# Patient Record
Sex: MALE | Race: WHITE | NOT HISPANIC OR LATINO | Employment: FULL TIME | URBAN - METROPOLITAN AREA
[De-identification: names, ages, dates, MRNs, and addresses within clinical notes are randomized per-mention and may not be internally consistent; named-entity substitution may affect disease eponyms.]

---

## 2018-04-18 LAB
LEFT EYE DM RETINOPATHY: NORMAL
RIGHT EYE DM RETINOPATHY: NORMAL

## 2018-05-19 ENCOUNTER — HOSPITAL ENCOUNTER (EMERGENCY)
Facility: HOSPITAL | Age: 48
Discharge: HOME | End: 2018-05-19
Attending: FAMILY MEDICINE
Payer: COMMERCIAL

## 2018-05-19 VITALS
TEMPERATURE: 99 F | SYSTOLIC BLOOD PRESSURE: 150 MMHG | DIASTOLIC BLOOD PRESSURE: 109 MMHG | HEART RATE: 97 BPM | RESPIRATION RATE: 16 BRPM | OXYGEN SATURATION: 97 %

## 2018-05-19 DIAGNOSIS — J02.9 PHARYNGITIS, UNSPECIFIED ETIOLOGY: Primary | ICD-10-CM

## 2018-05-19 PROCEDURE — 99283 EMERGENCY DEPT VISIT LOW MDM: CPT

## 2018-05-19 PROCEDURE — 63600175 PHARM REV CODE 636 W HCPCS: Mod: JG | Performed by: FAMILY MEDICINE

## 2018-05-19 RX ADMIN — PENICILLIN G BENZATHINE 1.2 MILLION UNITS: 1200000 INJECTION, SUSPENSION INTRAMUSCULAR at 01:05

## 2018-05-19 NOTE — ED PROVIDER NOTES
Encounter Date: 5/19/2018       History     Chief Complaint   Patient presents with    Oral Swelling     Pt complains of sorethroat for the past several hours, states has history of tonsillits and strep in the past and this feels the same. No fever or chills.           Review of patient's allergies indicates:  No Known Allergies  Past Medical History:   Diagnosis Date    Hypertension      Past Surgical History:   Procedure Laterality Date    KNEE SURGERY       No family history on file.  Social History   Substance Use Topics    Smoking status: Former Smoker     Packs/day: 1.00     Years: 15.00     Types: Cigarettes     Quit date: 5/19/2010    Smokeless tobacco: Never Used    Alcohol use No     Review of Systems   Constitutional: Negative.    HENT: Positive for sore throat.    Eyes: Negative.    Respiratory: Negative.    Cardiovascular: Negative.    Gastrointestinal: Negative.    Endocrine: Negative.    Genitourinary: Negative.    Musculoskeletal: Negative.    Neurological: Negative.    Hematological: Negative.    Psychiatric/Behavioral: Negative.        Physical Exam     Initial Vitals [05/19/18 0102]   BP Pulse Resp Temp SpO2   (!) 150/109 97 16 99.3 °F (37.4 °C) 97 %      MAP       122.67         Physical Exam    Nursing note and vitals reviewed.  Constitutional: He appears well-developed and well-nourished. He is not diaphoretic. No distress.   HENT:   Head: Normocephalic and atraumatic.   OP erythematous, mild tonsillar exudates, left greater than right.    Eyes: Pupils are equal, round, and reactive to light.   Neck: Normal range of motion. Neck supple.   Cardiovascular: Normal rate, regular rhythm, normal heart sounds and intact distal pulses. Exam reveals no gallop and no friction rub.    No murmur heard.  Pulmonary/Chest: Breath sounds normal. No respiratory distress. He has no wheezes. He has no rhonchi. He has no rales. He exhibits no tenderness.   Abdominal: Soft. Bowel sounds are normal. He  exhibits no distension. There is no tenderness. There is no rebound and no guarding.   Musculoskeletal: Normal range of motion. He exhibits no edema.   Neurological: He is alert and oriented to person, place, and time. He has normal strength.   Skin: Skin is warm and dry. Capillary refill takes less than 2 seconds.   Psychiatric: He has a normal mood and affect. His behavior is normal. Judgment and thought content normal.         ED Course   Procedures  Labs Reviewed - No data to display                               Clinical Impression:   The encounter diagnosis was Pharyngitis, unspecified etiology.                           Sudha Yo MD  05/19/18 0123

## 2018-05-19 NOTE — ED TRIAGE NOTES
Pt reports painful throat since 4pm.  Reports painful swallowing.  Pt stated he has had tonsil issues as a teenager.

## 2018-05-22 ENCOUNTER — HOSPITAL ENCOUNTER (EMERGENCY)
Facility: HOSPITAL | Age: 48
Discharge: HOME | End: 2018-05-23
Attending: EMERGENCY MEDICINE
Payer: COMMERCIAL

## 2018-05-22 VITALS
TEMPERATURE: 99 F | HEIGHT: 70 IN | HEART RATE: 92 BPM | SYSTOLIC BLOOD PRESSURE: 146 MMHG | RESPIRATION RATE: 16 BRPM | DIASTOLIC BLOOD PRESSURE: 96 MMHG | WEIGHT: 204 LBS | BODY MASS INDEX: 29.2 KG/M2 | OXYGEN SATURATION: 96 %

## 2018-05-22 DIAGNOSIS — J36 PERITONSILLAR ABSCESS: Primary | ICD-10-CM

## 2018-05-22 LAB
ANION GAP SERPL CALC-SCNC: 9 MMOL/L
BASOPHILS # BLD AUTO: 0.04 K/UL
BASOPHILS NFR BLD: 0.3 %
BUN SERPL-MCNC: 12 MG/DL
CALCIUM SERPL-MCNC: 9 MG/DL
CHLORIDE SERPL-SCNC: 98 MMOL/L
CO2 SERPL-SCNC: 26 MMOL/L
CREAT SERPL-MCNC: 0.9 MG/DL
DIFFERENTIAL METHOD: ABNORMAL
EOSINOPHIL # BLD AUTO: 0.1 K/UL
EOSINOPHIL NFR BLD: 0.8 %
ERYTHROCYTE [DISTWIDTH] IN BLOOD BY AUTOMATED COUNT: 11.3 %
EST. GFR  (AFRICAN AMERICAN): >60 ML/MIN/1.73 M^2
EST. GFR  (NON AFRICAN AMERICAN): >60 ML/MIN/1.73 M^2
GLUCOSE SERPL-MCNC: 291 MG/DL
HCT VFR BLD AUTO: 44.1 %
HGB BLD-MCNC: 16 G/DL
IMM GRANULOCYTES # BLD AUTO: 0.04 K/UL
IMM GRANULOCYTES NFR BLD AUTO: 0.3 %
LYMPHOCYTES # BLD AUTO: 1.3 K/UL
LYMPHOCYTES NFR BLD: 10.7 %
MCH RBC QN AUTO: 30.8 PG
MCHC RBC AUTO-ENTMCNC: 36.3 G/DL
MCV RBC AUTO: 85 FL
MONOCYTES # BLD AUTO: 1.4 K/UL
MONOCYTES NFR BLD: 11.2 %
NEUTROPHILS # BLD AUTO: 9.5 K/UL
NEUTROPHILS NFR BLD: 76.7 %
NRBC BLD-RTO: 0 /100 WBC
PLATELET # BLD AUTO: 341 K/UL
PMV BLD AUTO: 9.3 FL
POTASSIUM SERPL-SCNC: 3.5 MMOL/L
RBC # BLD AUTO: 5.19 M/UL
SODIUM SERPL-SCNC: 133 MMOL/L
WBC # BLD AUTO: 12.45 K/UL

## 2018-05-22 PROCEDURE — 99284 EMERGENCY DEPT VISIT MOD MDM: CPT | Mod: 25

## 2018-05-22 PROCEDURE — 85025 COMPLETE CBC W/AUTO DIFF WBC: CPT

## 2018-05-22 PROCEDURE — 96375 TX/PRO/DX INJ NEW DRUG ADDON: CPT

## 2018-05-22 PROCEDURE — 96361 HYDRATE IV INFUSION ADD-ON: CPT

## 2018-05-22 PROCEDURE — 63600175 PHARM REV CODE 636 W HCPCS: Performed by: EMERGENCY MEDICINE

## 2018-05-22 PROCEDURE — 25000003 PHARM REV CODE 250: Performed by: EMERGENCY MEDICINE

## 2018-05-22 PROCEDURE — 70491 CT SOFT TISSUE NECK W/DYE: CPT | Mod: TC

## 2018-05-22 PROCEDURE — 80048 BASIC METABOLIC PNL TOTAL CA: CPT

## 2018-05-22 PROCEDURE — 96374 THER/PROPH/DIAG INJ IV PUSH: CPT | Mod: 59

## 2018-05-22 PROCEDURE — 70491 CT SOFT TISSUE NECK W/DYE: CPT | Mod: 26,,, | Performed by: RADIOLOGY

## 2018-05-22 PROCEDURE — 25500020 PHARM REV CODE 255: Performed by: EMERGENCY MEDICINE

## 2018-05-22 RX ORDER — AMOXICILLIN AND CLAVULANATE POTASSIUM 875; 125 MG/1; MG/1
1 TABLET, FILM COATED ORAL
Status: COMPLETED | OUTPATIENT
Start: 2018-05-22 | End: 2018-05-22

## 2018-05-22 RX ORDER — ONDANSETRON 2 MG/ML
4 INJECTION INTRAMUSCULAR; INTRAVENOUS
Status: COMPLETED | OUTPATIENT
Start: 2018-05-22 | End: 2018-05-22

## 2018-05-22 RX ORDER — DEXAMETHASONE SODIUM PHOSPHATE 100 MG/10ML
10 INJECTION INTRAMUSCULAR; INTRAVENOUS
Status: COMPLETED | OUTPATIENT
Start: 2018-05-22 | End: 2018-05-22

## 2018-05-22 RX ORDER — BETAMETHASONE SODIUM PHOSPHATE AND BETAMETHASONE ACETATE 3; 3 MG/ML; MG/ML
12 INJECTION, SUSPENSION INTRA-ARTICULAR; INTRALESIONAL; INTRAMUSCULAR; SOFT TISSUE
Status: COMPLETED | OUTPATIENT
Start: 2018-05-22 | End: 2018-05-22

## 2018-05-22 RX ORDER — MORPHINE SULFATE 4 MG/ML
4 INJECTION, SOLUTION INTRAMUSCULAR; INTRAVENOUS
Status: COMPLETED | OUTPATIENT
Start: 2018-05-22 | End: 2018-05-22

## 2018-05-22 RX ADMIN — SODIUM CHLORIDE 1000 ML: 9 INJECTION, SOLUTION INTRAVENOUS at 08:05

## 2018-05-22 RX ADMIN — DEXAMETHASONE SODIUM PHOSPHATE 10 MG: 10 INJECTION INTRAMUSCULAR; INTRAVENOUS at 08:05

## 2018-05-22 RX ADMIN — AMOXICILLIN AND CLAVULANATE POTASSIUM 1 TABLET: 875; 125 TABLET, FILM COATED ORAL at 11:05

## 2018-05-22 RX ADMIN — Medication 12 MG: at 10:05

## 2018-05-22 RX ADMIN — IOHEXOL 75 ML: 350 INJECTION, SOLUTION INTRAVENOUS at 09:05

## 2018-05-22 RX ADMIN — ONDANSETRON 4 MG: 2 INJECTION INTRAMUSCULAR; INTRAVENOUS at 09:05

## 2018-05-22 RX ADMIN — MORPHINE SULFATE 4 MG: 4 INJECTION INTRAVENOUS at 09:05

## 2018-05-23 NOTE — ED NOTES
Updated pt and family on plan of care. Informed Dr Glass would see them soon regarding ENT recommendations.

## 2018-05-27 NOTE — ED PROVIDER NOTES
Encounter Date: 5/22/2018       History     Chief Complaint   Patient presents with    Sore Throat     seen here 5/19/18, given Bicillian, getting worse, having difficulty swallowing, also on Levaquin     Gradual marked worsening of sore throat day 3-4 following abx including Bicillin and Levaquin.    Dysphagia and dysphonia present     No stridor   No dyspnea           Review of patient's allergies indicates:  No Known Allergies  Past Medical History:   Diagnosis Date    Hypertension      Past Surgical History:   Procedure Laterality Date    KNEE SURGERY       History reviewed. No pertinent family history.  Social History   Substance Use Topics    Smoking status: Former Smoker     Packs/day: 1.00     Years: 15.00     Types: Cigarettes     Quit date: 5/19/2010    Smokeless tobacco: Never Used    Alcohol use No     Review of Systems   Constitutional: Positive for fever.   HENT: Positive for sore throat, trouble swallowing and voice change. Negative for ear pain and sinus pain.    Respiratory: Negative.    Cardiovascular: Negative.    Gastrointestinal: Negative for abdominal pain, nausea and vomiting.   Musculoskeletal: Negative.    Skin: Negative.    Hematological: Positive for adenopathy. Does not bruise/bleed easily.   All other systems reviewed and are negative.      Physical Exam     Initial Vitals [05/22/18 1950]   BP Pulse Resp Temp SpO2   (!) 148/104 (!) 115 20 98.9 °F (37.2 °C) 99 %      MAP       118.67         Physical Exam    Nursing note and vitals reviewed.  Constitutional: He appears well-developed and well-nourished. He is not diaphoretic. No distress.   HENT:   Head: Normocephalic and atraumatic.   Mouth/Throat: Mucous membranes are normal. Posterior oropharyngeal edema, posterior oropharyngeal erythema and tonsillar abscesses (Right sided ) present.   Eyes: Conjunctivae and EOM are normal. Pupils are equal, round, and reactive to light.   Neck: Neck supple. Carotid bruit is not present. No JVD  present.   Cardiovascular: Normal rate, regular rhythm, normal heart sounds and intact distal pulses.  No extrasystoles are present. Exam reveals no gallop and no friction rub.    No murmur heard.  Pulses:       Radial pulses are 2+ on the right side, and 2+ on the left side.   Pulmonary/Chest: Breath sounds normal. No respiratory distress. He has no decreased breath sounds. He has no wheezes. He has no rhonchi. He has no rales. He exhibits no tenderness.   Abdominal: Soft. Bowel sounds are normal. He exhibits no distension and no mass. There is no tenderness. There is no rebound and no guarding.   Musculoskeletal: Normal range of motion. He exhibits no edema or tenderness.   Neurological: He is alert and oriented to person, place, and time. He has normal strength. No sensory deficit.   Skin: Skin is warm and dry. Capillary refill takes less than 2 seconds. No rash noted. No erythema. No pallor.   Psychiatric: He has a normal mood and affect. His behavior is normal. Judgment and thought content normal.         ED Course   Procedures  Labs Reviewed   BASIC METABOLIC PANEL - Abnormal; Notable for the following:        Result Value    Sodium 133 (*)     Glucose 291 (*)     All other components within normal limits   CBC W/ AUTO DIFFERENTIAL - Abnormal; Notable for the following:     MCHC 36.3 (*)     RDW 11.3 (*)     Gran # (ANC) 9.5 (*)     Mono # 1.4 (*)     Gran% 76.7 (*)     Lymph% 10.7 (*)     All other components within normal limits             Medical Decision Making:   Initial Assessment:   R PTA  ED Management:  IVF  IV steroids  Augmentin    Imaging     Discussed with Dr MIMI COHEN who will see at 0830 for I and D   Pt understands plan of care and follow up at 0830                      Clinical Impression:   The encounter diagnosis was Peritonsillar abscess.    Disposition:   Disposition: Discharged  Condition: Stable                        Arnaldo Glass MD  05/26/18 7055

## 2019-04-11 ENCOUNTER — HOSPITAL ENCOUNTER (EMERGENCY)
Facility: HOSPITAL | Age: 49
Discharge: HOME | End: 2019-04-11
Payer: COMMERCIAL

## 2019-04-11 VITALS
WEIGHT: 208 LBS | DIASTOLIC BLOOD PRESSURE: 108 MMHG | HEIGHT: 70 IN | HEART RATE: 105 BPM | OXYGEN SATURATION: 96 % | RESPIRATION RATE: 18 BRPM | BODY MASS INDEX: 29.78 KG/M2 | SYSTOLIC BLOOD PRESSURE: 162 MMHG | TEMPERATURE: 99 F

## 2019-04-11 DIAGNOSIS — J02.0 STREP THROAT: Primary | ICD-10-CM

## 2019-04-11 LAB — DEPRECATED S PYO AG THROAT QL EIA: NEGATIVE

## 2019-04-11 PROCEDURE — 96372 THER/PROPH/DIAG INJ SC/IM: CPT

## 2019-04-11 PROCEDURE — 63600175 PHARM REV CODE 636 W HCPCS: Mod: JG | Performed by: NURSE PRACTITIONER

## 2019-04-11 PROCEDURE — 87880 STREP A ASSAY W/OPTIC: CPT

## 2019-04-11 PROCEDURE — 87081 CULTURE SCREEN ONLY: CPT

## 2019-04-11 PROCEDURE — 99284 EMERGENCY DEPT VISIT MOD MDM: CPT | Mod: 25

## 2019-04-11 PROCEDURE — 87147 CULTURE TYPE IMMUNOLOGIC: CPT

## 2019-04-11 RX ADMIN — PENICILLIN G BENZATHINE 1.2 MILLION UNITS: 1200000 INJECTION, SUSPENSION INTRAMUSCULAR at 04:04

## 2019-04-11 NOTE — DISCHARGE INSTRUCTIONS
Warm salt water gargles several times daily. Follow up with your primary care provider. Return to ER if symptoms worsen. Over the counter tylenol or motrin as directed for fever or pain.

## 2019-04-11 NOTE — ED PROVIDER NOTES
"Encounter Date: 2019       History     Chief Complaint   Patient presents with    Sore Throat     onset yesterday, reports pus pockets visualized      C/o sorethroat and "tonsils swelling" onset yesterday; hx same 1 year ago which lead to peritonsillar abscess requiring surgical drainage. Denies difficulty swallowing but report painful swallowing. Denies fever/chils. Denies cough.        Review of patient's allergies indicates:  No Known Allergies  Past Medical History:   Diagnosis Date    Hypertension      Past Surgical History:   Procedure Laterality Date    KNEE SURGERY       No family history on file.  Social History     Tobacco Use    Smoking status: Former Smoker     Packs/day: 1.00     Years: 15.00     Pack years: 15.00     Types: Cigarettes     Last attempt to quit: 2010     Years since quittin.9    Smokeless tobacco: Never Used   Substance Use Topics    Alcohol use: No    Drug use: No     Review of Systems   Constitutional: Negative for chills and fever.   HENT: Positive for sore throat. Negative for sinus pressure and sinus pain.    Respiratory: Negative for cough and shortness of breath.    Gastrointestinal: Negative for nausea and vomiting.   Genitourinary: Negative for difficulty urinating, dysuria and flank pain.   Skin: Negative for rash and wound.   Neurological: Negative for dizziness and headaches.   All other systems reviewed and are negative.      Physical Exam     Initial Vitals [19 1605]   BP Pulse Resp Temp SpO2   (!) 162/108 105 18 99 °F (37.2 °C) 96 %      MAP       --         Physical Exam    Nursing note and vitals reviewed.  Constitutional: He appears well-developed and well-nourished. He is not diaphoretic. No distress.   HENT:   Head: Normocephalic and atraumatic.   Mouth/Throat: Oropharynx is clear and moist.   Diffuse pharyngeal erythema with white patchy exudate. Mild enlargement of left tonsil; no airway encroachment, no airway edema.   Eyes: Pupils are " equal, round, and reactive to light.   Neck: Normal range of motion. Neck supple.   Cardiovascular: Normal rate, regular rhythm, normal heart sounds and intact distal pulses.   No murmur heard.  Pulmonary/Chest: Breath sounds normal. No respiratory distress.   Abdominal: Soft. Bowel sounds are normal. He exhibits no distension.   Musculoskeletal: Normal range of motion. He exhibits no edema or tenderness.   Neurological: He is alert and oriented to person, place, and time. GCS score is 15. GCS eye subscore is 4. GCS verbal subscore is 5. GCS motor subscore is 6.   Skin: Skin is warm and dry. Capillary refill takes less than 2 seconds.   Psychiatric: He has a normal mood and affect.         ED Course   Procedures  Labs Reviewed   THROAT SCREEN, RAPID          Imaging Results    None          Medical Decision Making:   Differential Diagnosis:   Strep throat, viral pharyngitis, peritonsillar abscess  Clinical Tests:   Lab Tests: Ordered                      Clinical Impression:       ICD-10-CM ICD-9-CM   1. Strep throat J02.0 034.0                                Michelle Sommers NP  04/11/19 3252

## 2019-04-13 LAB — BACTERIA THROAT CULT: NORMAL

## 2019-06-12 ENCOUNTER — PATIENT OUTREACH (OUTPATIENT)
Dept: ADMINISTRATIVE | Facility: HOSPITAL | Age: 49
End: 2019-06-12

## 2019-06-24 ENCOUNTER — OFFICE VISIT (OUTPATIENT)
Dept: FAMILY MEDICINE | Facility: CLINIC | Age: 49
End: 2019-06-24
Payer: COMMERCIAL

## 2019-06-24 ENCOUNTER — LAB VISIT (OUTPATIENT)
Dept: LAB | Facility: HOSPITAL | Age: 49
End: 2019-06-24
Attending: FAMILY MEDICINE
Payer: COMMERCIAL

## 2019-06-24 VITALS
SYSTOLIC BLOOD PRESSURE: 136 MMHG | HEART RATE: 77 BPM | HEIGHT: 71 IN | WEIGHT: 203 LBS | OXYGEN SATURATION: 98 % | RESPIRATION RATE: 18 BRPM | DIASTOLIC BLOOD PRESSURE: 87 MMHG | BODY MASS INDEX: 28.42 KG/M2

## 2019-06-24 DIAGNOSIS — E11.9 TYPE 2 DIABETES MELLITUS WITHOUT COMPLICATION, WITHOUT LONG-TERM CURRENT USE OF INSULIN: ICD-10-CM

## 2019-06-24 DIAGNOSIS — E11.42 DIABETIC POLYNEUROPATHY ASSOCIATED WITH TYPE 2 DIABETES MELLITUS: Primary | ICD-10-CM

## 2019-06-24 DIAGNOSIS — G47.61 PLMD (PERIODIC LIMB MOVEMENT DISORDER): ICD-10-CM

## 2019-06-24 LAB
ALBUMIN SERPL BCP-MCNC: 4.4 G/DL (ref 3.5–5.2)
ALP SERPL-CCNC: 61 U/L (ref 55–135)
ALT SERPL W/O P-5'-P-CCNC: 26 U/L (ref 10–44)
ANION GAP SERPL CALC-SCNC: 11 MMOL/L (ref 8–16)
AST SERPL-CCNC: 17 U/L (ref 10–40)
BASOPHILS # BLD AUTO: 0.04 K/UL (ref 0–0.2)
BASOPHILS NFR BLD: 0.6 % (ref 0–1.9)
BILIRUB SERPL-MCNC: 0.8 MG/DL (ref 0.1–1)
BUN SERPL-MCNC: 21 MG/DL (ref 6–20)
CALCIUM SERPL-MCNC: 9.6 MG/DL (ref 8.7–10.5)
CHLORIDE SERPL-SCNC: 101 MMOL/L (ref 95–110)
CHOLEST SERPL-MCNC: 138 MG/DL (ref 120–199)
CHOLEST/HDLC SERPL: 3.5 {RATIO} (ref 2–5)
CO2 SERPL-SCNC: 28 MMOL/L (ref 23–29)
CREAT SERPL-MCNC: 0.8 MG/DL (ref 0.5–1.4)
DIFFERENTIAL METHOD: ABNORMAL
EOSINOPHIL # BLD AUTO: 0.1 K/UL (ref 0–0.5)
EOSINOPHIL NFR BLD: 2.1 % (ref 0–8)
ERYTHROCYTE [DISTWIDTH] IN BLOOD BY AUTOMATED COUNT: 11.6 % (ref 11.5–14.5)
EST. GFR  (AFRICAN AMERICAN): >60 ML/MIN/1.73 M^2
EST. GFR  (NON AFRICAN AMERICAN): >60 ML/MIN/1.73 M^2
ESTIMATED AVG GLUCOSE: 237 MG/DL (ref 68–131)
GLUCOSE SERPL-MCNC: 134 MG/DL (ref 70–110)
HBA1C MFR BLD HPLC: 9.9 % (ref 4.5–6.2)
HCT VFR BLD AUTO: 43.6 % (ref 40–54)
HDLC SERPL-MCNC: 39 MG/DL (ref 40–75)
HDLC SERPL: 28.3 % (ref 20–50)
HGB BLD-MCNC: 14.5 G/DL (ref 14–18)
IMM GRANULOCYTES # BLD AUTO: 0.02 K/UL (ref 0–0.04)
IMM GRANULOCYTES NFR BLD AUTO: 0.3 % (ref 0–0.5)
LDLC SERPL CALC-MCNC: 80.6 MG/DL (ref 63–159)
LYMPHOCYTES # BLD AUTO: 1.9 K/UL (ref 1–4.8)
LYMPHOCYTES NFR BLD: 28.1 % (ref 18–48)
MCH RBC QN AUTO: 30 PG (ref 27–31)
MCHC RBC AUTO-ENTMCNC: 33.3 G/DL (ref 32–36)
MCV RBC AUTO: 90 FL (ref 82–98)
MONOCYTES # BLD AUTO: 0.7 K/UL (ref 0.3–1)
MONOCYTES NFR BLD: 10.9 % (ref 4–15)
NEUTROPHILS # BLD AUTO: 3.9 K/UL (ref 1.8–7.7)
NEUTROPHILS NFR BLD: 58 % (ref 38–73)
NONHDLC SERPL-MCNC: 99 MG/DL
NRBC BLD-RTO: 0 /100 WBC
PLATELET # BLD AUTO: 320 K/UL (ref 150–350)
PMV BLD AUTO: 8.9 FL (ref 9.2–12.9)
POTASSIUM SERPL-SCNC: 4.1 MMOL/L (ref 3.5–5.1)
PROT SERPL-MCNC: 7.9 G/DL (ref 6–8.4)
RBC # BLD AUTO: 4.84 M/UL (ref 4.6–6.2)
SODIUM SERPL-SCNC: 140 MMOL/L (ref 136–145)
T4 FREE SERPL-MCNC: 0.81 NG/DL (ref 0.71–1.51)
TRIGL SERPL-MCNC: 92 MG/DL (ref 30–150)
TSH SERPL DL<=0.005 MIU/L-ACNC: 1.59 UIU/ML (ref 0.34–5.6)
WBC # BLD AUTO: 6.68 K/UL (ref 3.9–12.7)

## 2019-06-24 PROCEDURE — 80061 LIPID PANEL: CPT

## 2019-06-24 PROCEDURE — 99999 PR PBB SHADOW E&M-EST. PATIENT-LVL III: CPT | Mod: PBBFAC,,, | Performed by: FAMILY MEDICINE

## 2019-06-24 PROCEDURE — 84439 ASSAY OF FREE THYROXINE: CPT

## 2019-06-24 PROCEDURE — 85025 COMPLETE CBC W/AUTO DIFF WBC: CPT

## 2019-06-24 PROCEDURE — 99204 PR OFFICE/OUTPT VISIT, NEW, LEVL IV, 45-59 MIN: ICD-10-PCS | Mod: S$GLB,,, | Performed by: FAMILY MEDICINE

## 2019-06-24 PROCEDURE — 83036 HEMOGLOBIN GLYCOSYLATED A1C: CPT

## 2019-06-24 PROCEDURE — 99999 PR PBB SHADOW E&M-EST. PATIENT-LVL III: ICD-10-PCS | Mod: PBBFAC,,, | Performed by: FAMILY MEDICINE

## 2019-06-24 PROCEDURE — 84443 ASSAY THYROID STIM HORMONE: CPT

## 2019-06-24 PROCEDURE — 84681 ASSAY OF C-PEPTIDE: CPT

## 2019-06-24 PROCEDURE — 36415 COLL VENOUS BLD VENIPUNCTURE: CPT

## 2019-06-24 PROCEDURE — 99204 OFFICE O/P NEW MOD 45 MIN: CPT | Mod: S$GLB,,, | Performed by: FAMILY MEDICINE

## 2019-06-24 PROCEDURE — 80053 COMPREHEN METABOLIC PANEL: CPT

## 2019-06-24 RX ORDER — GABAPENTIN 100 MG/1
100 CAPSULE ORAL 3 TIMES DAILY
Refills: 0 | COMMUNITY
Start: 2019-06-12 | End: 2019-06-24 | Stop reason: SDUPTHER

## 2019-06-24 RX ORDER — LANCETS 26 GAUGE
EACH MISCELLANEOUS
Refills: 5 | COMMUNITY
Start: 2019-04-29

## 2019-06-24 RX ORDER — BLOOD-GLUCOSE METER
EACH MISCELLANEOUS
Refills: 0 | COMMUNITY
Start: 2019-04-30

## 2019-06-24 RX ORDER — PEN NEEDLE, DIABETIC 30 GX3/16"
NEEDLE, DISPOSABLE MISCELLANEOUS
Refills: 3 | COMMUNITY
Start: 2019-06-19 | End: 2019-11-11 | Stop reason: SDUPTHER

## 2019-06-24 RX ORDER — ROPINIROLE 2 MG/1
2 TABLET, FILM COATED ORAL NIGHTLY
Qty: 30 TABLET | Refills: 2 | Status: SHIPPED | OUTPATIENT
Start: 2019-06-24 | End: 2019-10-14 | Stop reason: SDUPTHER

## 2019-06-24 RX ORDER — TELMISARTAN 40 MG/1
40 TABLET ORAL DAILY
Refills: 3 | COMMUNITY
Start: 2019-06-19 | End: 2019-09-03

## 2019-06-24 RX ORDER — CALCIUM CITRATE/VITAMIN D3 200MG-6.25
TABLET ORAL
Refills: 5 | COMMUNITY
Start: 2019-06-19 | End: 2020-09-10 | Stop reason: SDUPTHER

## 2019-06-24 RX ORDER — GABAPENTIN 300 MG/1
300 CAPSULE ORAL 3 TIMES DAILY
Qty: 90 CAPSULE | Refills: 2 | Status: SHIPPED | OUTPATIENT
Start: 2019-06-24 | End: 2019-07-15

## 2019-06-24 RX ORDER — ATORVASTATIN CALCIUM 40 MG/1
40 TABLET, FILM COATED ORAL DAILY
Refills: 0 | COMMUNITY
Start: 2019-04-29 | End: 2019-07-15 | Stop reason: SDUPTHER

## 2019-06-24 NOTE — PROGRESS NOTES
"Subjective:       Patient ID: Umberto Okeefe is a 49 y.o. male.    Chief Complaint: Establish Care and Leg Pain (& feet pain)    Establish care    In regard to the patient's diabetes, patient reports that blood sugars have been Good control. Patient denieshypoglycemia. Patient is not currently on insulin therapy. Patient is on ACE/ARB and is on statin. Last a1c was No results found for: HGBA1C    Neuropathy  Worsening  B  Feet            Review of Systems   Constitutional: Negative for activity change, appetite change, fatigue and fever.   HENT: Negative for congestion, dental problem, ear discharge, hearing loss, postnasal drip, sinus pain, sneezing and trouble swallowing.    Eyes: Negative for photophobia and discharge.   Respiratory: Negative for apnea, cough and shortness of breath.    Cardiovascular: Negative for chest pain.   Gastrointestinal: Negative for abdominal distention, abdominal pain, blood in stool, diarrhea, nausea and vomiting.   Endocrine: Negative for cold intolerance.   Genitourinary: Negative for difficulty urinating, flank pain, frequency, hematuria and testicular pain.   Musculoskeletal: Negative for arthralgias and myalgias.   Skin: Negative for color change.   Allergic/Immunologic: Negative for environmental allergies, food allergies and immunocompromised state.   Neurological: Negative for dizziness, syncope, numbness and headaches.   Hematological: Negative for adenopathy. Does not bruise/bleed easily.   Psychiatric/Behavioral: Negative for agitation, confusion, decreased concentration, hallucinations, self-injury and sleep disturbance.   All other systems reviewed and are negative.        Reviewed family, medical, surgical, and social history.    Objective:      /87 (BP Location: Right arm, Patient Position: Sitting, BP Method: Medium (Automatic))   Pulse 77   Resp 18   Ht 5' 10.5" (1.791 m)   Wt 92.1 kg (203 lb)   SpO2 98%   BMI 28.72 kg/m²   Physical Exam "   Constitutional: He is oriented to person, place, and time. He appears well-developed and well-nourished. No distress.   HENT:   Head: Normocephalic and atraumatic.   Nose: Nose normal.   Mouth/Throat: Oropharynx is clear and moist. No oropharyngeal exudate.   Eyes: Pupils are equal, round, and reactive to light. Conjunctivae and EOM are normal.   Neck: Normal range of motion. No thyromegaly present.   Cardiovascular: Normal rate, regular rhythm, normal heart sounds and intact distal pulses. Exam reveals no gallop and no friction rub.   No murmur heard.  Pulmonary/Chest: Effort normal and breath sounds normal. No respiratory distress. He has no wheezes. He has no rales.   Abdominal: Soft. He exhibits no distension. There is no tenderness. There is no guarding.   Musculoskeletal: Normal range of motion. He exhibits no edema, tenderness or deformity.   Neurological: He is alert and oriented to person, place, and time. He displays normal reflexes. No cranial nerve deficit or sensory deficit. He exhibits normal muscle tone. Coordination normal.   Skin: Skin is warm and dry. No rash noted. He is not diaphoretic. No erythema. No pallor.   Psychiatric: He has a normal mood and affect. His behavior is normal. Judgment and thought content normal.   Nursing note and vitals reviewed.      Assessment:       1. Diabetic polyneuropathy associated with type 2 diabetes mellitus    2. Type 2 diabetes mellitus without complication, without long-term current use of insulin    3. PLMD (periodic limb movement disorder)        Plan:       Diabetic polyneuropathy associated with type 2 diabetes mellitus  -     gabapentin (NEURONTIN) 300 MG capsule; Take 1 capsule (300 mg total) by mouth 3 (three) times daily.  Dispense: 90 capsule; Refill: 2    Type 2 diabetes mellitus without complication, without long-term current use of insulin  -     CBC auto differential; Future; Expected date: 06/24/2019  -     Comprehensive metabolic panel;  Future; Expected date: 06/24/2019  -     Microalbumin/creatinine urine ratio; Future  -     Lipid panel; Future; Expected date: 06/24/2019  -     TSH; Future; Expected date: 06/24/2019  -     T4, free; Future; Expected date: 06/24/2019  -     Hemoglobin A1c; Future; Expected date: 06/24/2019  -     C-peptide; Future; Expected date: 06/24/2019    PLMD (periodic limb movement disorder)  -     rOPINIRole (REQUIP) 2 MG tablet; Take 1 tablet (2 mg total) by mouth every evening.  Dispense: 30 tablet; Refill: 2    Check labs  Increase gabapentin  Start ropinirole        Risks, benefits, and side effects were discussed with the patient. All questions were answered to the fullest satisfaction of the patient, and pt verbalized understanding and agreement to treatment plan. Pt was to call with any new or worsening symptoms, or present to the ER.

## 2019-06-25 LAB — C PEPTIDE SERPL-MCNC: 2.89 NG/ML (ref 0.78–5.19)

## 2019-07-01 ENCOUNTER — PATIENT OUTREACH (OUTPATIENT)
Dept: ADMINISTRATIVE | Facility: HOSPITAL | Age: 49
End: 2019-07-01

## 2019-07-10 ENCOUNTER — TELEPHONE (OUTPATIENT)
Dept: FAMILY MEDICINE | Facility: CLINIC | Age: 49
End: 2019-07-10

## 2019-07-10 NOTE — TELEPHONE ENCOUNTER
I have returned Carolina's call.  Pt has sinus drainage at the back of his throat, clogged ears, hoarseness.  No c/o sore throat.  No fever.  He is coughing up clear phlegm.  Wife asked what pt may take OTC?  Please advise and thank you, Sofia

## 2019-07-10 NOTE — TELEPHONE ENCOUNTER
----- Message from Marie Phillips sent at 7/10/2019  8:13 AM CDT -----  Contact: Carolina/Wife  Type:  Sooner Apoointment Request    Caller is requesting a sooner appointment.  Caller declined first available appointment listed below.  Caller will not accept being placed on the waitlist and is requesting a message be sent to doctor.    Name of Caller:  Carolina  When is the first available appointment?  7/15  Symptoms:  Cough,drainage,ears stopped up  Best Call Back Number: [phone]   Additional Information:  Carolina would like to know if patient can take some over the counter medicine, Carolina is unsure due to patient's diabetes what he can take.  Please call to advise and schedule. Thanks!

## 2019-07-15 ENCOUNTER — OFFICE VISIT (OUTPATIENT)
Dept: FAMILY MEDICINE | Facility: CLINIC | Age: 49
End: 2019-07-15
Payer: COMMERCIAL

## 2019-07-15 VITALS
HEART RATE: 84 BPM | WEIGHT: 201.69 LBS | BODY MASS INDEX: 28.87 KG/M2 | SYSTOLIC BLOOD PRESSURE: 114 MMHG | DIASTOLIC BLOOD PRESSURE: 63 MMHG | RESPIRATION RATE: 18 BRPM | OXYGEN SATURATION: 96 % | HEIGHT: 70 IN

## 2019-07-15 DIAGNOSIS — E11.9 TYPE 2 DIABETES MELLITUS WITHOUT COMPLICATION, WITHOUT LONG-TERM CURRENT USE OF INSULIN: Primary | ICD-10-CM

## 2019-07-15 DIAGNOSIS — G62.9 NEUROPATHY: ICD-10-CM

## 2019-07-15 PROCEDURE — 99999 PR PBB SHADOW E&M-EST. PATIENT-LVL IV: CPT | Mod: PBBFAC,,, | Performed by: FAMILY MEDICINE

## 2019-07-15 PROCEDURE — 99214 PR OFFICE/OUTPT VISIT, EST, LEVL IV, 30-39 MIN: ICD-10-PCS | Mod: S$GLB,,, | Performed by: FAMILY MEDICINE

## 2019-07-15 PROCEDURE — 99999 PR PBB SHADOW E&M-EST. PATIENT-LVL IV: ICD-10-PCS | Mod: PBBFAC,,, | Performed by: FAMILY MEDICINE

## 2019-07-15 PROCEDURE — 99214 OFFICE O/P EST MOD 30 MIN: CPT | Mod: S$GLB,,, | Performed by: FAMILY MEDICINE

## 2019-07-15 RX ORDER — PREGABALIN 150 MG/1
150 CAPSULE ORAL 2 TIMES DAILY
Qty: 60 CAPSULE | Refills: 2 | Status: SHIPPED | OUTPATIENT
Start: 2019-07-15 | End: 2019-07-25

## 2019-07-15 RX ORDER — ATORVASTATIN CALCIUM 40 MG/1
40 TABLET, FILM COATED ORAL DAILY
Qty: 90 TABLET | Refills: 3 | Status: SHIPPED | OUTPATIENT
Start: 2019-07-15 | End: 2020-07-06 | Stop reason: SDUPTHER

## 2019-07-15 NOTE — PROGRESS NOTES
"Subjective:       Patient ID: Umberto Okeefe is a 49 y.o. male.    Chief Complaint: Leg Pain; Hip Pain; and Follow-up (BW review)    Follow up    DM not controlled  A1c 9.9  Taking meds as prescribed    Neuropathy  Not improved with gabapentin  Associated with back pain    Review of Systems   Constitutional: Negative for activity change, appetite change, fatigue and fever.   HENT: Negative for congestion, dental problem, ear discharge, hearing loss, postnasal drip, sinus pain, sneezing and trouble swallowing.    Eyes: Negative for photophobia and discharge.   Respiratory: Negative for apnea, cough and shortness of breath.    Cardiovascular: Negative for chest pain.   Gastrointestinal: Negative for abdominal distention, abdominal pain, blood in stool, diarrhea, nausea and vomiting.   Endocrine: Negative for cold intolerance.   Genitourinary: Negative for difficulty urinating, flank pain, frequency, hematuria and testicular pain.   Musculoskeletal: Positive for arthralgias, back pain, gait problem and myalgias.   Skin: Negative for color change.   Allergic/Immunologic: Negative for environmental allergies, food allergies and immunocompromised state.   Neurological: Negative for dizziness, syncope, numbness and headaches.   Hematological: Negative for adenopathy. Does not bruise/bleed easily.   Psychiatric/Behavioral: Negative for agitation, confusion, decreased concentration, hallucinations, self-injury and sleep disturbance.   All other systems reviewed and are negative.        Reviewed family, medical, surgical, and social history.    Objective:      /63 (BP Location: Left arm, Patient Position: Sitting, BP Method: Medium (Automatic))   Pulse 84   Resp 18   Ht 5' 10" (1.778 m)   Wt 91.5 kg (201 lb 11.2 oz)   SpO2 96%   BMI 28.94 kg/m²   Physical Exam   Constitutional: He is oriented to person, place, and time. He appears well-developed and well-nourished. No distress.   HENT:   Head: Normocephalic and " atraumatic.   Nose: Nose normal.   Mouth/Throat: Oropharynx is clear and moist. No oropharyngeal exudate.   Eyes: Pupils are equal, round, and reactive to light. Conjunctivae and EOM are normal.   Neck: Normal range of motion. No thyromegaly present.   Cardiovascular: Normal rate, regular rhythm, normal heart sounds and intact distal pulses. Exam reveals no gallop and no friction rub.   No murmur heard.  Pulmonary/Chest: Effort normal and breath sounds normal. No respiratory distress. He has no wheezes. He has no rales.   Abdominal: Soft. He exhibits no distension. There is no tenderness. There is no guarding.   Musculoskeletal: Normal range of motion. He exhibits tenderness and deformity. He exhibits no edema.   Neurological: He is alert and oriented to person, place, and time. He displays normal reflexes. No cranial nerve deficit or sensory deficit. He exhibits normal muscle tone. Coordination normal.   Skin: Skin is warm and dry. No rash noted. He is not diaphoretic. No erythema. No pallor.   Psychiatric: He has a normal mood and affect. His behavior is normal. Judgment and thought content normal.   Nursing note and vitals reviewed.      Assessment:       1. Type 2 diabetes mellitus without complication, without long-term current use of insulin    2. Neuropathy        Plan:       Type 2 diabetes mellitus without complication, without long-term current use of insulin  -     Ambulatory referral to Podiatry  -     Hemoglobin A1c; Future; Expected date: 07/15/2019  -     Comprehensive metabolic panel; Future; Expected date: 07/15/2019    Neuropathy  -     pregabalin (LYRICA) 150 MG capsule; Take 1 capsule (150 mg total) by mouth 2 (two) times daily.  Dispense: 60 capsule; Refill: 2  -     Ambulatory referral to Podiatry    Other orders  -     atorvastatin (LIPITOR) 40 MG tablet; Take 1 tablet (40 mg total) by mouth once daily.  Dispense: 90 tablet; Refill: 3  -     insulin detemir U-100 (LEVEMIR FLEXTOUCH) 100  unit/mL (3 mL) SubQ InPn pen; Inject 47 Units into the skin every evening.  Dispense: 42.3 mL; Refill: 0    Trial of lyrica  Increase insulin  Follow up in 3 months        Risks, benefits, and side effects were discussed with the patient. All questions were answered to the fullest satisfaction of the patient, and pt verbalized understanding and agreement to treatment plan. Pt was to call with any new or worsening symptoms, or present to the ER.

## 2019-07-16 ENCOUNTER — TELEPHONE (OUTPATIENT)
Dept: FAMILY MEDICINE | Facility: CLINIC | Age: 49
End: 2019-07-16

## 2019-07-16 NOTE — TELEPHONE ENCOUNTER
----- Message from Sagrario Machuca sent at 7/16/2019 10:20 AM CDT -----  Contact: Carolina Okeefe (Spouse)  Carolina Okeefe (Spouse)states the pt was seen on yesterday and the new Rx the Dr gave him the insurance will not pay foe it, the pharmacy was supposed to have sent this tot he office and she is calling to see if he office received it. The medication was for the pain in in pt legs and feet ...[phone]

## 2019-07-17 ENCOUNTER — TELEPHONE (OUTPATIENT)
Dept: FAMILY MEDICINE | Facility: CLINIC | Age: 49
End: 2019-07-17

## 2019-07-17 RX ORDER — INSULIN GLARGINE 100 [IU]/ML
47 INJECTION, SOLUTION SUBCUTANEOUS NIGHTLY
Qty: 14.1 ML | Refills: 11 | Status: SHIPPED | OUTPATIENT
Start: 2019-07-17 | End: 2019-07-22 | Stop reason: SDUPTHER

## 2019-07-17 NOTE — TELEPHONE ENCOUNTER
I have called pharmacy and the system still pulls up that the medication needs a PA. I called [facility] and the PA has been denied because pt has not been tried on  SSRI or tricyclic antidepressants, although pt did fail gabapentin 900 mg a day treatment.  Wife notified.  Please advise and thank you, Sofia

## 2019-07-17 NOTE — TELEPHONE ENCOUNTER
Pt is requesting to change Levemir to a more affordable insulin- Levemir copap is $75.  Please advise, thank you!        ----- Message from Alvaro Vann sent at 7/17/2019 10:26 AM CDT -----  Contact: Wife/Carolina Figueroa called in regarding the attached patient () and wanted to see if regarding patients Rx for his insulin detemir U-100 (LEVEMIR FLEXTOUCH) 100 unit/mL (3 mL) SubQ InPn pen.  Carolina wanted to see if it was changed to another brand if it would be cheaper than the $75 co-pay.    Carolina's call back number is [phone]

## 2019-07-17 NOTE — TELEPHONE ENCOUNTER
----- Message from Rachelle Leiva sent at 7/17/2019  1:20 PM CDT -----  Contact: spouse jenelle [phone]  Patient checking the status of foot medication, pending insurance and patient been waiting since Monday.  Patient is in pain.  Please call with the status   If the insurance don't pay, please call in an alternative or check to see the medication would be without the insurance.    Please call patient spouse jenelle [phone]

## 2019-07-22 RX ORDER — INSULIN GLARGINE 100 [IU]/ML
47 INJECTION, SOLUTION SUBCUTANEOUS NIGHTLY
Qty: 14.1 ML | Refills: 11 | Status: SHIPPED | OUTPATIENT
Start: 2019-07-22 | End: 2019-09-26

## 2019-07-25 ENCOUNTER — OFFICE VISIT (OUTPATIENT)
Dept: PODIATRY | Facility: CLINIC | Age: 49
End: 2019-07-25
Payer: COMMERCIAL

## 2019-07-25 ENCOUNTER — HOSPITAL ENCOUNTER (OUTPATIENT)
Dept: RADIOLOGY | Facility: HOSPITAL | Age: 49
Discharge: HOME | End: 2019-07-25
Attending: PODIATRIST
Payer: COMMERCIAL

## 2019-07-25 VITALS
HEART RATE: 72 BPM | TEMPERATURE: 99 F | BODY MASS INDEX: 28.77 KG/M2 | RESPIRATION RATE: 18 BRPM | DIASTOLIC BLOOD PRESSURE: 80 MMHG | OXYGEN SATURATION: 98 % | HEIGHT: 70 IN | SYSTOLIC BLOOD PRESSURE: 120 MMHG | WEIGHT: 201 LBS

## 2019-07-25 DIAGNOSIS — M79.672 PAIN IN BOTH FEET: ICD-10-CM

## 2019-07-25 DIAGNOSIS — M79.671 PAIN IN BOTH FEET: ICD-10-CM

## 2019-07-25 DIAGNOSIS — G57.90 NEURITIS OF FOOT, UNSPECIFIED LATERALITY: ICD-10-CM

## 2019-07-25 DIAGNOSIS — E11.49 TYPE II DIABETES MELLITUS WITH NEUROLOGICAL MANIFESTATIONS: ICD-10-CM

## 2019-07-25 DIAGNOSIS — G57.93 NEUROPATHIC PAIN OF BOTH FEET: ICD-10-CM

## 2019-07-25 PROCEDURE — 99999 PR PBB SHADOW E&M-EST. PATIENT-LVL III: ICD-10-PCS | Mod: PBBFAC,,, | Performed by: PODIATRIST

## 2019-07-25 PROCEDURE — 99999 PR PBB SHADOW E&M-EST. PATIENT-LVL III: CPT | Mod: PBBFAC,,, | Performed by: PODIATRIST

## 2019-07-25 PROCEDURE — 73620 X-RAY EXAM OF FOOT: CPT | Mod: 26,50,, | Performed by: RADIOLOGY

## 2019-07-25 PROCEDURE — 73620 XR FOOT 2 VIEW BILATERAL: ICD-10-PCS | Mod: 26,50,, | Performed by: RADIOLOGY

## 2019-07-25 PROCEDURE — 99203 PR OFFICE/OUTPT VISIT, NEW, LEVL III, 30-44 MIN: ICD-10-PCS | Mod: S$GLB,,, | Performed by: PODIATRIST

## 2019-07-25 PROCEDURE — 99203 OFFICE O/P NEW LOW 30 MIN: CPT | Mod: S$GLB,,, | Performed by: PODIATRIST

## 2019-07-25 PROCEDURE — 73620 X-RAY EXAM OF FOOT: CPT | Mod: 50,TC,FY

## 2019-07-25 RX ORDER — GABAPENTIN 100 MG/1
1 CAPSULE ORAL
COMMUNITY
Start: 2019-06-12 | End: 2019-07-25

## 2019-07-25 RX ORDER — DICLOFENAC SODIUM 10 MG/G
2 GEL TOPICAL 3 TIMES DAILY
Qty: 100 G | Refills: 2 | Status: SHIPPED | OUTPATIENT
Start: 2019-07-25 | End: 2019-10-18 | Stop reason: SDUPTHER

## 2019-07-25 RX ORDER — LEVOFLOXACIN 500 MG/1
TABLET, FILM COATED ORAL
COMMUNITY
Start: 2018-05-21 | End: 2019-08-08 | Stop reason: ALTCHOICE

## 2019-07-25 RX ORDER — NAPROXEN SODIUM 220 MG/1
TABLET, FILM COATED ORAL
COMMUNITY
Start: 2019-04-25 | End: 2019-07-25

## 2019-07-25 RX ORDER — MELOXICAM 15 MG/1
15 TABLET ORAL DAILY
Qty: 30 TABLET | Refills: 0 | Status: SHIPPED | OUTPATIENT
Start: 2019-07-25 | End: 2019-08-24

## 2019-07-25 NOTE — LETTER
July 29, 2019      Maicol Alba MD  [address]  [place] [address]           [facility]  [address] [facility] [place] [address]  Phone: [phone]  Fax: [phone]          Patient: Umberto Okeefe   MR Number: [id number]   YOB: 1970   Date of Visit: 7/25/2019       Dear Dr. Maicol Alba:    Thank you for referring Umberto Okeefe to me for evaluation. Attached you will find relevant portions of my assessment and plan of care.    If you have questions, please do not hesitate to call me. I look forward to following Umberto Okeefe along with you.    Sincerely,    Galilea Thomas, DPSOLANGE    Enclosure  CC:  No Recipients    If you would like to receive this communication electronically, please contact [email or web address] or [phone] to request more information on [employer] access.    For providers and/or their staff who would like to refer a patient to [facility], please contact us through our one-stop-shop provider referral line, [facility], at [phone].    If you feel you have received this communication in error or would no longer like to receive these types of communications, please e-mail [email or web address]

## 2019-07-27 ENCOUNTER — PATIENT MESSAGE (OUTPATIENT)
Dept: FAMILY MEDICINE | Facility: CLINIC | Age: 49
End: 2019-07-27

## 2019-07-29 RX ORDER — MONTELUKAST SODIUM 10 MG/1
10 TABLET ORAL NIGHTLY
Qty: 30 TABLET | Refills: 2 | Status: SHIPPED | OUTPATIENT
Start: 2019-07-29 | End: 2019-08-28

## 2019-07-30 NOTE — PROGRESS NOTES
Subjective:      Patient ID: Umberto Okeefe is a 49 y.o. male.    Chief Complaint: Diabetes Mellitus and Foot Pain (both feet, Numbness and Pain when walking )  Patient via referral Dr. Alba for diabetic foot exam  In complaint of numbness and pain worse in the toes, but all throughout his feet.  States it worse when walking and while rest at rest at night.  States his feet are very sensitive, always thinking, started with a needle sensation in the left big toe about 3 years ago.  States pain has escalated.   He reports a history of back and hip problems.  He was  Just recently diagnosed with diabetes in April, has had neuropathy for quite a few years prior to that diagnosis.  Does relate it is not well controlled at this time, he is seeing Dr. Alba, hoping to get his diabetes under control.  Glucose this morning was 194. He has been taking 300 mg of Neurontin 3 times a day, this was originally prescribed for nerve type symptoms in his hips.  States PCP  prescribed Lyrica but his insurance will not pay for        ROS  Constitutional    Pleasant, well-nourished, no distress, well oriented    Eyes         GLASSES    Cardiovascular          No chest pain, no shortness of breath,  no swelling in the extremities    Respiratory          No cough, no congestion     Musculoskeletal         No muscle aches, YES arthralgias/joint pain    Neurologic         NEUROPATHY    Endocrine          DIABETES        Objective:      Physical Exam   Cardiovascular:   Pulses:       Dorsalis pedis pulses are 2+ on the right side, and 2+ on the left side.        Posterior tibial pulses are 2+ on the right side, and 2+ on the left side.   Musculoskeletal:        Right foot: There is normal range of motion.        Left foot: There is normal range of motion.   Feet:   Right Foot:   Protective Sensation: 4 sites tested. 4 sites sensed.   Skin Integrity: Negative for erythema or callus.   Left Foot:   Protective Sensation: 4 sites tested. 4  sites sensed.   Skin Integrity: Negative for erythema or callus.   Vascular         Normal CFT bilateral   No lower extremity edema bilateral   Pedal skin temperature and color are normal bilateral     Integumentary    in good condition with no complications          Neurological   Gross sensation intact,  there is evidence of acute neuritis through all web spaces, feet are hypersensitive, positive paresthesias bilateral feet consistent with neuropathy    Musculoskeletal   Muscle Strength/Testing and Tone:  Intact, normal tone bilateral   Joints, Bones, and Muscles: Limited ROM 1st MPJ bilateral, AJ equinus bilateral        Walks well unassisted        Presents in appropriate shoes      X-Ray Foot 2 View Bilateral   Details     Reading Physician Reading Date Result Priority   Derek Joiner MD 7/25/2019 Routine      Narrative     EXAMINATION:  XR FOOT 2 VIEW BILATERAL    CLINICAL HISTORY:  . Pain in right foot    TECHNIQUE:  AP  and lateral views of the bilateral feet were performed.    COMPARISON:  None    FINDINGS:  Right:    No acute fracture or dislocation.  No significant soft tissue swelling.    Mild degenerative osteoarthrosis involving the 1st MTP joint and the IP joints of the digits.  The remaining joint spaces are preserved.  The tarsal bones are normal in appearance.  Normal tarsometatarsal alignment.    Small dorsal calcaneal spur.    Left:    No acute fracture or dislocation. No significant soft tissue swelling.    Mild degenerative osteoarthrosis involving the IP joints of the digits.  The remaining joint spaces are preserved. The tarsal bones are normal in appearance. Normal tarsometatarsal alignment.    Small dorsal and plantar calcaneal spurs.      Impression       1. No acute radiographic findings of the bilateral feet.  2. Bilateral calcaneal spurs.               Hemoglobin A1c    Ref Range & Units 1mo ago   Hemoglobin A1C 4.5 - 6.2 % 9.9High        Estimated Avg Glucose 68 - 131 mg/dL  237High                 Assessment:       Encounter Diagnoses   Name Primary?    Type II diabetes mellitus with neurological manifestations     Neuropathic pain of both feet     Neuritis of foot, unspecified laterality     Pain in both feet          Plan:       Umberto was seen today for diabetes mellitus and foot pain.    Diagnoses and all orders for this visit:    Type II diabetes mellitus with neurological manifestations    Neuropathic pain of both feet    Neuritis of foot, unspecified laterality    Pain in both feet  -     X-Ray Foot 2 View Bilateral; Future    Other orders  -     diclofenac sodium (VOLTAREN) 1 % Gel; Apply 2 g topically 3 (three) times daily.  -     meloxicam (MOBIC) 15 MG tablet; Take 1 tablet (15 mg total) by mouth once daily.    Reviewed x-rays with patient  Reviewed diabetic education with patient  And the benefits of tighter control of glucose/ diabetes regarding neuropathy.  Explained the patient the best treatment to decrease neuropathy pain and prevent it from getting worse in the future is better control of his diabetes.  Explained there are no medications to treat neuropathy, treatment is control of his diabetes.  Advised medications prescribed for neuropathy are only for the symptoms.  We had a lengthy discussion regarding neuritis and neuropathy.  Explained to patient he demonstrates evidence of both conditions.  Explained neuritis of this level is usually usually treated with IM cortisone injection, however it increases glucose and with his current glucose of 194 and diabetes not well controlled he is not a candidate for this injection.  Discussed other treatments for inflammation including ice /cool therapy as tolerated, as long as beneficial.  If [place] painful discontinue  Prescribed Voltaren gel and explained how to apply daily to feet  Started meloxicam 15 mg daily.  Discussed this medication at length with patient.  Instructed not to take any over-the-counter  anti-inflammatory with this medication.  Advised these treat 3 treatments can be very beneficial for neuritis  [place] may decrease his pain significantly.  Advised in the future if glucose is well controlled if needed we can consider IM cortisone injection.  Advised patient depending on outcome of treatments for inflammation we may increase his gabapentin.  Reviewed appropriate shoes, especially for indoors and explained how important appropriate sure is our with neuritis and neuropathy.  Patient was in understanding and agreement with treatment plan.  I counseled the patient on his conditions, their implications and medical management.  Instructed patient to contact the office with any changes, questions, concerns, worsening of symptoms. Patient verbalized understanding.   Total face-to-face time, exam, assessment, treatment, discussion, documentation 30 minutes, more than half this time spent on consultation and coordination of care.  Follow up 2 weeks      This note was created using [employer] voice recognition software that occasionally misinterpreted phrases or words.

## 2019-08-08 ENCOUNTER — OFFICE VISIT (OUTPATIENT)
Dept: PODIATRY | Facility: CLINIC | Age: 49
End: 2019-08-08
Payer: COMMERCIAL

## 2019-08-08 ENCOUNTER — TELEPHONE (OUTPATIENT)
Dept: PODIATRY | Facility: CLINIC | Age: 49
End: 2019-08-08

## 2019-08-08 VITALS
RESPIRATION RATE: 16 BRPM | HEIGHT: 70 IN | BODY MASS INDEX: 28.77 KG/M2 | TEMPERATURE: 98 F | WEIGHT: 201 LBS | SYSTOLIC BLOOD PRESSURE: 130 MMHG | HEART RATE: 71 BPM | DIASTOLIC BLOOD PRESSURE: 73 MMHG

## 2019-08-08 DIAGNOSIS — M54.9 BACK PAIN, UNSPECIFIED BACK LOCATION, UNSPECIFIED BACK PAIN LATERALITY, UNSPECIFIED CHRONICITY: Primary | ICD-10-CM

## 2019-08-08 DIAGNOSIS — E11.49 TYPE II DIABETES MELLITUS WITH NEUROLOGICAL MANIFESTATIONS: Primary | ICD-10-CM

## 2019-08-08 DIAGNOSIS — G57.90 NEURITIS OF FOOT, UNSPECIFIED LATERALITY: ICD-10-CM

## 2019-08-08 DIAGNOSIS — Z91.81 HISTORY OF FALL: ICD-10-CM

## 2019-08-08 DIAGNOSIS — M79.672 PAIN IN BOTH FEET: ICD-10-CM

## 2019-08-08 DIAGNOSIS — M79.671 PAIN IN BOTH FEET: ICD-10-CM

## 2019-08-08 PROCEDURE — 96372 THER/PROPH/DIAG INJ SC/IM: CPT | Mod: S$GLB,,, | Performed by: PODIATRIST

## 2019-08-08 PROCEDURE — 96372 PR INJECTION,THERAP/PROPH/DIAG2ST, IM OR SUBCUT: ICD-10-PCS | Mod: S$GLB,,, | Performed by: PODIATRIST

## 2019-08-08 PROCEDURE — 99999 PR PBB SHADOW E&M-EST. PATIENT-LVL III: CPT | Mod: PBBFAC,,, | Performed by: PODIATRIST

## 2019-08-08 PROCEDURE — 99215 OFFICE O/P EST HI 40 MIN: CPT | Mod: 25,S$GLB,, | Performed by: PODIATRIST

## 2019-08-08 PROCEDURE — 99215 PR OFFICE/OUTPT VISIT, EST, LEVL V, 40-54 MIN: ICD-10-PCS | Mod: 25,S$GLB,, | Performed by: PODIATRIST

## 2019-08-08 PROCEDURE — 99999 PR PBB SHADOW E&M-EST. PATIENT-LVL III: ICD-10-PCS | Mod: PBBFAC,,, | Performed by: PODIATRIST

## 2019-08-08 RX ORDER — BETAMETHASONE SODIUM PHOSPHATE AND BETAMETHASONE ACETATE 3; 3 MG/ML; MG/ML
18 INJECTION, SUSPENSION INTRA-ARTICULAR; INTRALESIONAL; INTRAMUSCULAR; SOFT TISSUE
Status: COMPLETED | OUTPATIENT
Start: 2019-08-08 | End: 2019-08-08

## 2019-08-08 RX ORDER — GABAPENTIN 300 MG/1
300 CAPSULE ORAL 3 TIMES DAILY
COMMUNITY
End: 2019-10-28

## 2019-08-08 RX ADMIN — BETAMETHASONE SODIUM PHOSPHATE AND BETAMETHASONE ACETATE 18 MG: 3; 3 INJECTION, SUSPENSION INTRA-ARTICULAR; INTRALESIONAL; INTRAMUSCULAR; SOFT TISSUE at 04:08

## 2019-08-08 NOTE — LETTER
August 12, 2019      Maicol Alba MD  [address]  [place] [address]           [facility]  [address] [place] [address]  Phone: [phone]  Fax: [phone]          Patient: Umberto Okeefe   MR Number: [id number]   YOB: 1970   Date of Visit: 8/8/2019       Dear Dr. Maicol Alba:    Thank you for referring Umberto Okeefe to me for evaluation. Attached you will find relevant portions of my assessment and plan of care.    If you have questions, please do not hesitate to call me. I look forward to following Umberto Okeefe along with you.    Sincerely,    Galilea Thomas, DPSOLANGE    Enclosure  CC:  No Recipients    If you would like to receive this communication electronically, please contact [email or web address] or [phone] to request more information on numberFire Link access.    For providers and/or their staff who would like to refer a patient to [facility], please contact us through our one-stop-shop provider referral line, [facility], at [phone].    If you feel you have received this communication in error or would no longer like to receive these types of communications, please e-mail [email or web address]

## 2019-08-08 NOTE — TELEPHONE ENCOUNTER
Memo Milian. I have been treating Mr Okeefe for some nerve related symptoms in his feet associated with an injury to his hips and lower back 2-3 years ago. He fell on his back side and has had hip pain L>R, low back pain and progressing symptoms in his feet with what sound likely sciatica or radiculopathy on the left leg side. He has an upcoming appt with you. Would you order xrays for his lower back and hips so you could discuss the results at that appt? Otherwise he'll wait until your f/u. thanks

## 2019-08-12 NOTE — PROGRESS NOTES
Subjective:      Patient ID: Umberto Okeefe is a 49 y.o. male.    Chief Complaint: Follow-up  Patient  presents with his wife for follow-up pain in both feet, neuritis, diabetes type 2 with neuropathy.  Patient reports Mobic and Voltaren Gel have both helped, having less pain overall.  Tolerating Mobic well, believes it is helped multiple areas. He reports however pain escalate throughout the day, worse left side, still having radiating pain down his left side, wife rubs over-the-counter medication outside hip and leg every night.  Does relate diabetes is improving,  reports glucose has been in normal range last few days. Patient and wife state symptoms in his feet and radiating pain on the left side seemed to have started after an injury 2-3 years ago where he fell off a hover board on to his tailbone.  Taking 300 mg Neurontin t.i.d. For diabetic neuropathy.      ROS  Constitutional    Pleasant, well-nourished, no distress, well oriented    Eyes         GLASSES    Cardiovascular          No chest pain, no shortness of breath,  no swelling in the extremities    Respiratory          No cough, no congestion     Musculoskeletal         No muscle aches, YES arthralgias/joint pain    Neurologic         NEUROPATHY    Endocrine          DIABETES        Objective:      Physical Exam   Cardiovascular:   Pulses:       Dorsalis pedis pulses are 2+ on the right side, and 2+ on the left side.        Posterior tibial pulses are 2+ on the right side, and 2+ on the left side.   Musculoskeletal:        Right foot: There is normal range of motion.        Left foot: There is normal range of motion.   Feet:   Right Foot:   Protective Sensation: 2 sites tested. 2 sites sensed.   Left Foot:   Protective Sensation: 2 sites tested. 2 sites sensed.   Vascular         Normal CFT bilateral   No lower extremity edema bilateral   Pedal skin temperature and color are normal bilateral     Integumentary    Unremarkable        Neurological    Gross sensation intact,  acute neuritis through web spaces is improved, remaining paresthesias bilateral feet consistent with neuropathy    Musculoskeletal   Muscle Strength/Testing and Tone:  Intact, normal tone bilateral   Joints, Bones, and Muscles: Limited ROM 1st MPJ bilateral, AJ equinus bilateral  Previous x-rays positive for mild degenerative arthritis IP joints and small calcaneal spurs        Walks well unassisted        Presents in appropriate shoes          Assessment:       Encounter Diagnoses   Name Primary?    Type II diabetes mellitus with neurological manifestations Yes    Neuritis of foot, unspecified laterality     Pain in both feet     History of fall          Plan:       Umberto was seen today for follow-up.    Diagnoses and all orders for this visit:    Type II diabetes mellitus with neurological manifestations    Neuritis of foot, unspecified laterality    Pain in both feet    History of fall    Other orders  -     betamethasone acetate-betamethasone sodium phosphate injection 18 mg      Advised patient decrease pain over 2 weeks with mild oral anti-inflammatory and topical is a good sign a lot of his pain is inflammation related.  We had discussed contacting Dr. Alba before patient's next appointment for possible x-rays/MRI.  I cannot find a message sent to him following patient's last visit and reassured patient I will again message Dr Alba regarding x-rays for back and hip since I do agree with him this may be lower back / hip related due to radiating pain on the left side.  Explained to patient and wife neuropathy diabetic neuropathy does not cause radiating pain on only 1 side.   Reviewed with patient his underlying discomfort and tingling symmetrical in both feet is diabetic related.  We reviewed acute neuritis and advised patient this is the condition in which he has improved significantly and hopefully will continue to do so over the next few months.   We again reviewed IM  cortisone injection, effectiveness of medication in decreasing inflammation.  Recommended injection today since patient's glucose has been well controlled the last few days.  Advised patient this injection cannot be given out very often and is strictly to reduce inflammation, most likely will not help his diabetic neuropathy.  Explained the patient injection will increase his glucose, he needs to check it several times for the next few days and follow a very strict diabetic diet.  Patient related he was in understanding and did wish to pursue IM injection.  Patient was administered 3 cc betamethasone IM left hip  Instructed patient continue Mobic and Voltaren gel daily and we reviewed appropriate shoes, especially arch support to help take pressure off the ball of the foot.  We reviewed appropriate  arch supports and how these are to be utilized and adjusted to in his tennis shoes.  We reviewed shoes for indoors, absolutely no flat shoes or walking barefoot  We agree to refrain from increasing Neurontin at this time.  Reviewed with patient overall treatment to help decrease neuropathic pain and prevent it from progressing is tighter control of his diabetes.  Patient was in understanding and agreement with treatment plan.  I counseled the patient on his conditions, their implications and medical management.  Instructed patient to contact the office with any changes, questions, concerns, worsening of symptoms. Patient verbalized understanding.   Total face-to-face time, exam, assessment, treatment, discussion, documentation 40 minutes, more than half this time spent on consultation and coordination of care.  Follow up 4 weeks      This note was created using [email or web address] voice recognition software that occasionally misinterpreted phrases or words.

## 2019-08-15 ENCOUNTER — HOSPITAL ENCOUNTER (OUTPATIENT)
Dept: RADIOLOGY | Facility: HOSPITAL | Age: 49
Discharge: HOME | End: 2019-08-15
Attending: FAMILY MEDICINE
Payer: COMMERCIAL

## 2019-08-15 DIAGNOSIS — M54.9 BACK PAIN, UNSPECIFIED BACK LOCATION, UNSPECIFIED BACK PAIN LATERALITY, UNSPECIFIED CHRONICITY: ICD-10-CM

## 2019-08-15 PROCEDURE — 73521 X-RAY EXAM HIPS BI 2 VIEWS: CPT | Mod: 26,,, | Performed by: RADIOLOGY

## 2019-08-15 PROCEDURE — 73521 XR HIPS BILATERAL 2 VIEW INCL AP PELVIS: ICD-10-PCS | Mod: 26,,, | Performed by: RADIOLOGY

## 2019-08-15 PROCEDURE — 72100 XR LUMBAR SPINE AP AND LATERAL: ICD-10-PCS | Mod: 26,,, | Performed by: RADIOLOGY

## 2019-08-15 PROCEDURE — 72100 X-RAY EXAM L-S SPINE 2/3 VWS: CPT | Mod: TC,FY

## 2019-08-15 PROCEDURE — 73521 X-RAY EXAM HIPS BI 2 VIEWS: CPT | Mod: TC,FY

## 2019-08-15 PROCEDURE — 72100 X-RAY EXAM L-S SPINE 2/3 VWS: CPT | Mod: 26,,, | Performed by: RADIOLOGY

## 2019-09-02 ENCOUNTER — PATIENT MESSAGE (OUTPATIENT)
Dept: FAMILY MEDICINE | Facility: CLINIC | Age: 49
End: 2019-09-02

## 2019-09-03 ENCOUNTER — PATIENT MESSAGE (OUTPATIENT)
Dept: FAMILY MEDICINE | Facility: CLINIC | Age: 49
End: 2019-09-03

## 2019-09-03 RX ORDER — TELMISARTAN 20 MG/1
20 TABLET ORAL DAILY
Qty: 90 TABLET | Refills: 3 | Status: SHIPPED | OUTPATIENT
Start: 2019-09-03 | End: 2019-09-26

## 2019-09-03 NOTE — TELEPHONE ENCOUNTER
Pt requesting Mycardis 40mg tablets (pt takes 1/2 tab QD) changes to 20mg tablets so he doesn't have to break the tablets.  Order pended as requested.  Thank you!

## 2019-09-04 ENCOUNTER — TELEPHONE (OUTPATIENT)
Dept: PODIATRY | Facility: CLINIC | Age: 49
End: 2019-09-04

## 2019-09-04 RX ORDER — LISINOPRIL 20 MG/1
20 TABLET ORAL DAILY
Qty: 90 TABLET | Refills: 3 | Status: SHIPPED | OUTPATIENT
Start: 2019-09-04 | End: 2020-05-20 | Stop reason: SDUPTHER

## 2019-09-04 NOTE — TELEPHONE ENCOUNTER
Spoke with Carolina Okeefe patients wife, she stated patient has been following directions regarding shoes and medications as prescribed. Foot pain continues please advise.

## 2019-09-04 NOTE — TELEPHONE ENCOUNTER
Can increase Gabapentin, which I will call in if he wants or change to Lyrica which I would need to see him to start

## 2019-09-04 NOTE — TELEPHONE ENCOUNTER
----- Message from Sweetie Hinojosa sent at 9/4/2019  8:19 AM CDT -----  Contact: Carolina Okeefe (Spouse)  Type: Needs Medical Advice    Who Called:  Carolina Okeefe (Spouse)  Symptoms (please be specific):  After taking the diclofenac sodium (VOLTAREN) 1 % Gel, the patient still has pain in feet  Pharmacy name and phone #:   [id number] - [place], [place] - [address]  [address] [place] [address]  Phone: [phone] Fax: [phone]  Best Call Back Number: Carolina at   Additional Information: Calling to find out if there is a diff ointment or gel the patient can take for the pain

## 2019-09-04 NOTE — TELEPHONE ENCOUNTER
Pt stated that his pharmacy is out of stock of his BP medication. Pt is asking to be prescribed 20 mg Lisinopril tablet. Medication has been pend.  Please advise.

## 2019-09-26 ENCOUNTER — OFFICE VISIT (OUTPATIENT)
Dept: PODIATRY | Facility: CLINIC | Age: 49
End: 2019-09-26
Payer: COMMERCIAL

## 2019-09-26 VITALS
RESPIRATION RATE: 19 BRPM | WEIGHT: 201 LBS | SYSTOLIC BLOOD PRESSURE: 140 MMHG | HEART RATE: 72 BPM | DIASTOLIC BLOOD PRESSURE: 92 MMHG | HEIGHT: 70 IN | TEMPERATURE: 98 F | BODY MASS INDEX: 28.77 KG/M2 | OXYGEN SATURATION: 98 %

## 2019-09-26 DIAGNOSIS — G25.81 RESTLESS LEG SYNDROME: ICD-10-CM

## 2019-09-26 DIAGNOSIS — E11.49 TYPE II DIABETES MELLITUS WITH NEUROLOGICAL MANIFESTATIONS: ICD-10-CM

## 2019-09-26 DIAGNOSIS — G57.93 NEUROPATHIC PAIN OF BOTH FEET: Primary | ICD-10-CM

## 2019-09-26 PROCEDURE — 99999 PR PBB SHADOW E&M-EST. PATIENT-LVL III: CPT | Mod: PBBFAC,,, | Performed by: PODIATRIST

## 2019-09-26 PROCEDURE — 99214 PR OFFICE/OUTPT VISIT, EST, LEVL IV, 30-39 MIN: ICD-10-PCS | Mod: S$GLB,,, | Performed by: PODIATRIST

## 2019-09-26 PROCEDURE — 99214 OFFICE O/P EST MOD 30 MIN: CPT | Mod: S$GLB,,, | Performed by: PODIATRIST

## 2019-09-26 PROCEDURE — 99999 PR PBB SHADOW E&M-EST. PATIENT-LVL III: ICD-10-PCS | Mod: PBBFAC,,, | Performed by: PODIATRIST

## 2019-09-26 RX ORDER — GABAPENTIN 600 MG/1
600 TABLET ORAL 3 TIMES DAILY
Qty: 90 TABLET | Refills: 0 | Status: SHIPPED | OUTPATIENT
Start: 2019-09-26 | End: 2019-10-26

## 2019-09-26 NOTE — PROGRESS NOTES
Subjective:       Patient ID: Umberto Okeefe is a 49 y.o. male.    Chief Complaint: Diabetic Foot Exam  Patient  presents with his wife for follow-up neuropathic pain in both feet.  He does have a history of type 2 diabetes, diagnosed in April of this year, but symptoms in feet and legs have been present for quite a long time.  Patient states he no longer has the improvement he initially  experienced with Voltaren gel, it has become uncomfortable to rub it into his feet.  Following previous conversations on slowly increasing gabapentin patient relates he started to increase it slowly.  Last week for about 4 days he took 2- 300 mg gabapentin in the morning, 1 in the afternoon, 2 at night with his Requip which helped.  Last few days he has taken 2 in the morning, 2 in the afternoon and 2 at night with his Requip which seems to work even better. Last 2 days he has had less sensitivity in his feet, no side effects during the day, does make him sleepy at night.  Still wakes up early morning with uncomfortable feeling throughout his legs and is going to talk to Dr. Alba about increasing the Requip on their next appointment.  He is due to have blood work 10/11 and follow-up with his PCP on 10/14        Past Medical History:   Diagnosis Date    Diabetes mellitus, type 2     Hypertension     Neuropathy     Restless leg syndrome      Past Surgical History:   Procedure Laterality Date    KNEE SURGERY           Current Outpatient Medications   Medication Sig Dispense Refill    atorvastatin (LIPITOR) 40 MG tablet Take 1 tablet (40 mg total) by mouth once daily. 90 tablet 3    diclofenac sodium (VOLTAREN) 1 % Gel Apply 2 g topically 3 (three) times daily. 100 g 2    gabapentin (NEURONTIN) 300 MG capsule Take 300 mg by mouth 3 (three) times daily.      gabapentin (NEURONTIN) 600 MG tablet Take 1 tablet (600 mg total) by mouth 3 (three) times daily. 90 tablet 0    insulin detemir U-100 (LEVEMIR FLEXTOUCH) 100 unit/mL  "(3 mL) SubQ InPn pen Inject 47 Units into the skin every evening. 42.3 mL 0    lisinopril (PRINIVIL,ZESTRIL) 20 MG tablet Take 1 tablet (20 mg total) by mouth once daily. 90 tablet 3    pen needle, diabetic 31 gauge x 5/16" Ndle USE AS DIRECTED  3    rOPINIRole (REQUIP) 2 MG tablet Take 1 tablet (2 mg total) by mouth every evening. 30 tablet 2    THIN LANCETS 26 gauge Misc   5    TRUE METRIX GLUCOSE METER Misc   0    TRUE METRIX GLUCOSE TEST STRIP Strp USE AS DIRECTED TWICE DAILY  5     No current facility-administered medications for this visit.      Review of patient's allergies indicates:  No Known Allergies    Review of Systems    Constitutional    Pleasant, well-nourished, no distress, well oriented    Eyes         GLASSES    Cardiovascular          No chest pain, no shortness of breath,  no swelling in the extremities    Respiratory          No cough, no congestion     Musculoskeletal         No muscle aches, YES arthralgias/joint pain    Neurologic         NEUROPATHY, RLS    Endocrine          DIABETES      Objective:      Vitals:    09/26/19 0812   BP: (!) 140/92   Pulse: 72   Resp: 19   Temp: 97.7 °F (36.5 °C)   TempSrc: Oral   SpO2: 98%   Weight: 91.2 kg (201 lb)   Height: 5' 10" (1.778 m)     Physical Exam   Cardiovascular:   Pulses:       Dorsalis pedis pulses are 2+ on the right side, and 2+ on the left side.        Posterior tibial pulses are 2+ on the right side, and 2+ on the left side.   Musculoskeletal:        Right foot: There is decreased range of motion.        Left foot: There is decreased range of motion.   Feet:   Right Foot:   Protective Sensation: 4 sites tested. 4 sites sensed.   Vascular         Normal CFT bilateral   No lower extremity edema bilateral   Pedal skin temperature and color are normal bilateral     Integumentary         Soft skin texture bilateral feet       Neurological   Gross sensation intact, no neuritis through web spaces, painful paresthesias,  peripheral " neuropathic pain in feet    Musculoskeletal   Muscle Strength/Testing and Tone:  Intact, normal tone bilateral   Joints, Bones, and Muscles: Limited ROM 1st MPJ bilateral, AJ equinus bilateral  Previous x-rays positive for mild degenerative arthritis IP joints and small calcaneal spurs        Walks well unassisted        Presents in appropriate shoes       Assessment:       1. Neuropathic pain of both feet    2. Type II diabetes mellitus with neurological manifestations    3. Restless leg syndrome        Plan:             PRESCRIPTION DISPENSED FOR GABAPENTIN 300 MG T.I.D. TIMES 30 DAYS      Advised patient I would recommend 600 mg gabapentin 3 times daily for the next month before increased further.  Can consider 800 mg 3 times daily next month if needed.  Reviewed with patient goal is to control his pain with the least amount of medication needed.  He is going to discuss increasing Requip with Dr. Alba next visit.  Advised patient if Voltaren gel is uncomfortable and not helpiong he can discontinue it.  Recommend stretching 3 times daily.  Had a lengthy discussion with patient regarding cramping and muscle spasms, needs to stretch plantar fascia, calf / Achilles tendon and hamstrings, this needs to be done on a daily basis.  Reviewed shoes, especially for indoors, absolutely no flat shoes or walking barefoot  Reviewed with patient overall treatment to help decrease neuropathic pain and prevent it from progressing is tighter control of his diabetes.  Patient was in understanding and agreement with treatment plan.  I counseled the patient on his conditions, their implications and medical management.  Instructed patient to contact the office with any changes, questions, concerns, worsening of symptoms. Patient verbalized understanding.   Total face-to-face time, exam, assessment, treatment, discussion, documentation 25 minutes, more than half this time spent on consultation and coordination of care.  Follow up 6  months      This note was created using [employer] voice recognition software that occasionally misinterpreted phrases or words.

## 2019-09-26 NOTE — LETTER
September 26, 2019      Maicol Alba MD  [address]  [place] [address]           [facility]ound [facility]  [address] [facility] [place] [address]  Phone: [phone]  Fax: [phone]          Patient: Umberto Okeefe   MR Number: [id number]   YOB: 1970   Date of Visit: 9/26/2019       Dear Dr. Maicol Alba:    Thank you for referring Umberto Okeefe to me for evaluation. Attached you will find relevant portions of my assessment and plan of care.    If you have questions, please do not hesitate to call me. I look forward to following Umberto Okeefe along with you.    Sincerely,    Galilea Thomas, DPSOLANGE    Enclosure  CC:  No Recipients    If you would like to receive this communication electronically, please contact [email or web address] or [phone] to request more information on [employer] Link access.    For providers and/or their staff who would like to refer a patient to [facility], please contact us through our one-stop-shop provider referral line, [facility], at [phone].    If you feel you have received this communication in error or would no longer like to receive these types of communications, please e-mail [email or web address]

## 2019-09-30 ENCOUNTER — PATIENT OUTREACH (OUTPATIENT)
Dept: ADMINISTRATIVE | Facility: HOSPITAL | Age: 49
End: 2019-09-30

## 2019-09-30 NOTE — LETTER
October 8, 2019    Umberto Okeefe  [address] [place] [address]             [facility]  [address] [address]  Phone: [phone] Dear [facility] is committed to your overall health and would like to ensure that you are up to date on your recommended test and/or procedures.   Maicol Alba MD  has found that your chart shows you may be due for the following:     Eye Exam   TETANUS VACCINE   Pneumococcal Vaccine (Medium Risk)(1 of 1 - PPSV23)   Influenza Vaccine   Hemoglobin A1c     If you have had any of the above done at another facility, please let us know so that we may obtain copies from that facility.  If you have a copy of these records, please provide a copy for us to scan into your chart.  You are welcome to request that the report be faxed to us at  ([phone]).     Otherwise, please schedule these appointments at your earliest convenience by calling [phone] or going to [email or web address].     If you have an upcoming scheduled appointment for the item above, please disregard this letter.     Sincerely,   Your [facility]   MD Mitzi Izquierdo L.P.N. Clinical Care Coordinator   [address] [place] [address], [place] [address] [phone]

## 2019-10-03 ENCOUNTER — TELEPHONE (OUTPATIENT)
Dept: FAMILY MEDICINE | Facility: CLINIC | Age: 49
End: 2019-10-03

## 2019-10-03 NOTE — TELEPHONE ENCOUNTER
Pt is requesting OTC heartburn medication that will not effect his blood sugar.  Please advise, thank you.        ----- Message from Alvaro Vann sent at 10/3/2019  8:10 AM CDT -----  Contact: same  Patient called in and stated he is a diabetic and wanted to know what OTC medication he can take for heartburn?    Call back number is [phone]

## 2019-10-03 NOTE — TELEPHONE ENCOUNTER
Pt informed to take Pepcid 20mg BID for reflux.   Pt declined a prescription and states he will buy it OTC.

## 2019-10-08 NOTE — PROGRESS NOTES
"Attempted to outreach patient for pre-visit via "[employer]", no answer after a week. Sending outreach via Mail Out Letter now.    "

## 2019-10-11 ENCOUNTER — LAB VISIT (OUTPATIENT)
Dept: LAB | Facility: HOSPITAL | Age: 49
End: 2019-10-11
Attending: FAMILY MEDICINE
Payer: COMMERCIAL

## 2019-10-11 DIAGNOSIS — E11.9 TYPE 2 DIABETES MELLITUS WITHOUT COMPLICATION, WITHOUT LONG-TERM CURRENT USE OF INSULIN: ICD-10-CM

## 2019-10-11 LAB
ALBUMIN SERPL BCP-MCNC: 4.1 G/DL (ref 3.5–5.2)
ALP SERPL-CCNC: 63 U/L (ref 55–135)
ALT SERPL W/O P-5'-P-CCNC: 23 U/L (ref 10–44)
ANION GAP SERPL CALC-SCNC: 10 MMOL/L (ref 8–16)
AST SERPL-CCNC: 19 U/L (ref 10–40)
BILIRUB SERPL-MCNC: 0.6 MG/DL (ref 0.1–1)
BUN SERPL-MCNC: 16 MG/DL (ref 6–20)
CALCIUM SERPL-MCNC: 9.1 MG/DL (ref 8.7–10.5)
CHLORIDE SERPL-SCNC: 101 MMOL/L (ref 95–110)
CO2 SERPL-SCNC: 27 MMOL/L (ref 23–29)
CREAT SERPL-MCNC: 1.2 MG/DL (ref 0.5–1.4)
EST. GFR  (AFRICAN AMERICAN): >60 ML/MIN/1.73 M^2
EST. GFR  (NON AFRICAN AMERICAN): >60 ML/MIN/1.73 M^2
ESTIMATED AVG GLUCOSE: 169 MG/DL (ref 68–131)
GLUCOSE SERPL-MCNC: 151 MG/DL (ref 70–110)
HBA1C MFR BLD HPLC: 7.5 % (ref 4.5–6.2)
POTASSIUM SERPL-SCNC: 4.2 MMOL/L (ref 3.5–5.1)
PROT SERPL-MCNC: 7.2 G/DL (ref 6–8.4)
SODIUM SERPL-SCNC: 138 MMOL/L (ref 136–145)

## 2019-10-11 PROCEDURE — 36415 COLL VENOUS BLD VENIPUNCTURE: CPT

## 2019-10-11 PROCEDURE — 83036 HEMOGLOBIN GLYCOSYLATED A1C: CPT

## 2019-10-11 PROCEDURE — 80053 COMPREHEN METABOLIC PANEL: CPT

## 2019-10-14 ENCOUNTER — PATIENT MESSAGE (OUTPATIENT)
Dept: FAMILY MEDICINE | Facility: CLINIC | Age: 49
End: 2019-10-14

## 2019-10-14 ENCOUNTER — OFFICE VISIT (OUTPATIENT)
Dept: FAMILY MEDICINE | Facility: CLINIC | Age: 49
End: 2019-10-14
Payer: COMMERCIAL

## 2019-10-14 VITALS
SYSTOLIC BLOOD PRESSURE: 105 MMHG | WEIGHT: 206.81 LBS | OXYGEN SATURATION: 96 % | HEIGHT: 70 IN | HEART RATE: 78 BPM | DIASTOLIC BLOOD PRESSURE: 65 MMHG | BODY MASS INDEX: 29.61 KG/M2 | RESPIRATION RATE: 18 BRPM

## 2019-10-14 DIAGNOSIS — E11.9 TYPE 2 DIABETES MELLITUS WITHOUT COMPLICATION, WITHOUT LONG-TERM CURRENT USE OF INSULIN: ICD-10-CM

## 2019-10-14 DIAGNOSIS — G47.61 PLMD (PERIODIC LIMB MOVEMENT DISORDER): ICD-10-CM

## 2019-10-14 DIAGNOSIS — Z23 NEED FOR VACCINATION: Primary | ICD-10-CM

## 2019-10-14 PROCEDURE — 90471 FLU VACCINE (QUAD) GREATER THAN OR EQUAL TO 3YO PRESERVATIVE FREE IM: ICD-10-PCS | Mod: S$GLB,,, | Performed by: FAMILY MEDICINE

## 2019-10-14 PROCEDURE — 99999 PR PBB SHADOW E&M-EST. PATIENT-LVL IV: ICD-10-PCS | Mod: PBBFAC,,, | Performed by: FAMILY MEDICINE

## 2019-10-14 PROCEDURE — 90471 IMMUNIZATION ADMIN: CPT | Mod: S$GLB,,, | Performed by: FAMILY MEDICINE

## 2019-10-14 PROCEDURE — 90670 PCV13 VACCINE IM: CPT | Mod: S$GLB,,, | Performed by: FAMILY MEDICINE

## 2019-10-14 PROCEDURE — 90686 IIV4 VACC NO PRSV 0.5 ML IM: CPT | Mod: S$GLB,,, | Performed by: FAMILY MEDICINE

## 2019-10-14 PROCEDURE — 99214 OFFICE O/P EST MOD 30 MIN: CPT | Mod: 25,S$GLB,, | Performed by: FAMILY MEDICINE

## 2019-10-14 PROCEDURE — 99214 PR OFFICE/OUTPT VISIT, EST, LEVL IV, 30-39 MIN: ICD-10-PCS | Mod: 25,S$GLB,, | Performed by: FAMILY MEDICINE

## 2019-10-14 PROCEDURE — 90715 TDAP VACCINE 7 YRS/> IM: CPT | Mod: S$GLB,,, | Performed by: FAMILY MEDICINE

## 2019-10-14 PROCEDURE — 90670 PNEUMOCOCCAL CONJUGATE VACCINE 13-VALENT LESS THAN 5YO & GREATER THAN: ICD-10-PCS | Mod: S$GLB,,, | Performed by: FAMILY MEDICINE

## 2019-10-14 PROCEDURE — 90686 FLU VACCINE (QUAD) GREATER THAN OR EQUAL TO 3YO PRESERVATIVE FREE IM: ICD-10-PCS | Mod: S$GLB,,, | Performed by: FAMILY MEDICINE

## 2019-10-14 PROCEDURE — 90715 TDAP VACCINE GREATER THAN OR EQUAL TO 7YO IM: ICD-10-PCS | Mod: S$GLB,,, | Performed by: FAMILY MEDICINE

## 2019-10-14 PROCEDURE — 90472 IMMUNIZATION ADMIN EACH ADD: CPT | Mod: S$GLB,,, | Performed by: FAMILY MEDICINE

## 2019-10-14 PROCEDURE — 99999 PR PBB SHADOW E&M-EST. PATIENT-LVL IV: CPT | Mod: PBBFAC,,, | Performed by: FAMILY MEDICINE

## 2019-10-14 PROCEDURE — 90472 TDAP VACCINE GREATER THAN OR EQUAL TO 7YO IM: ICD-10-PCS | Mod: S$GLB,,, | Performed by: FAMILY MEDICINE

## 2019-10-14 RX ORDER — OFLOXACIN 3 MG/ML
5 SOLUTION AURICULAR (OTIC) DAILY
Qty: 5 ML | Refills: 0 | Status: SHIPPED | OUTPATIENT
Start: 2019-10-14 | End: 2020-04-15

## 2019-10-14 RX ORDER — ROPINIROLE 2 MG/1
2 TABLET, FILM COATED ORAL NIGHTLY
Qty: 30 TABLET | Refills: 2 | Status: SHIPPED | OUTPATIENT
Start: 2019-10-14 | End: 2020-01-15

## 2019-10-18 ENCOUNTER — TELEPHONE (OUTPATIENT)
Dept: PODIATRY | Facility: CLINIC | Age: 49
End: 2019-10-18

## 2019-10-18 DIAGNOSIS — M79.671 PAIN IN BOTH FEET: ICD-10-CM

## 2019-10-18 DIAGNOSIS — E11.49 TYPE II DIABETES MELLITUS WITH NEUROLOGICAL MANIFESTATIONS: Primary | ICD-10-CM

## 2019-10-18 DIAGNOSIS — M79.672 PAIN IN BOTH FEET: ICD-10-CM

## 2019-10-18 RX ORDER — DICLOFENAC SODIUM 10 MG/G
2 GEL TOPICAL 3 TIMES DAILY
Qty: 100 G | Refills: 4 | Status: SHIPPED | OUTPATIENT
Start: 2019-10-18 | End: 2020-10-21 | Stop reason: SDUPTHER

## 2019-10-18 NOTE — TELEPHONE ENCOUNTER
----- Message from Tracy Romero sent at 10/18/2019  1:52 PM CDT -----  Contact: Spouse- Carolina  Type:  RX Refill Request    Who Called:  Spouse  Refill or New Rx:  Refill  RX Name and Strength:  diclofenac sodium (VOLTAREN) 1 % Gel  How is the patient currently taking it? (ex. 1XDay):  Route: Apply 2 g topically 3 (three) times daily. - Topical (Top  Is this a 30 day or 90 day RX:  cream  Preferred Pharmacy with phone number:    [id number] - [place], [place] - [address]  [address]  [place] [address]  Phone: [phone] Fax: [phone]  Local or Mail Order:  local  Ordering Provider:  suzette Tovar Call Back Number:  [phone]  Additional Information:  Please Advise ---Thank you

## 2019-10-18 NOTE — TELEPHONE ENCOUNTER
Refill request:  Diclofenac Sodium (Voltaren) 1% Gel  Apply 2g topically Tid    Pharmacy:  [facility] in [place]    Last o//v 9/26/19  Last prescription start date was 7/25/19

## 2019-10-18 NOTE — TELEPHONE ENCOUNTER
----- Message from Tracy Romero sent at 10/18/2019  1:52 PM CDT -----  Contact: Spouse- Carolina  Type:  RX Refill Request    Who Called:  Spouse  Refill or New Rx:  Refill  RX Name and Strength:  diclofenac sodium (VOLTAREN) 1 % Gel  How is the patient currently taking it? (ex. 1XDay):  Route: Apply 2 g topically 3 (three) times daily. - Topical (Top  Is this a 30 day or 90 day RX:  cream  Preferred Pharmacy with phone number:    [id number] - [place], [place] - [address]  [address]  [place] [address]  Phone: [phone] Fax: [phone]  Local or Mail Order:  local  Ordering Provider:  suzette Tovar Call Back Number:  [phone]  Additional Information:  Please Advise ---Thank you

## 2019-10-22 ENCOUNTER — PATIENT MESSAGE (OUTPATIENT)
Dept: FAMILY MEDICINE | Facility: CLINIC | Age: 49
End: 2019-10-22

## 2019-10-22 DIAGNOSIS — G62.9 NEUROPATHY: Primary | ICD-10-CM

## 2019-10-28 DIAGNOSIS — E11.49 TYPE II DIABETES MELLITUS WITH NEUROLOGICAL MANIFESTATIONS: ICD-10-CM

## 2019-10-28 DIAGNOSIS — G57.93 NEUROPATHIC PAIN OF BOTH FEET: Primary | ICD-10-CM

## 2019-10-28 RX ORDER — GABAPENTIN 600 MG/1
600 TABLET ORAL 3 TIMES DAILY
Qty: 90 TABLET | Refills: 4 | Status: SHIPPED | OUTPATIENT
Start: 2019-10-28 | End: 2019-11-27

## 2019-10-29 ENCOUNTER — PATIENT MESSAGE (OUTPATIENT)
Dept: FAMILY MEDICINE | Facility: CLINIC | Age: 49
End: 2019-10-29

## 2019-11-06 ENCOUNTER — PATIENT MESSAGE (OUTPATIENT)
Dept: FAMILY MEDICINE | Facility: CLINIC | Age: 49
End: 2019-11-06

## 2019-11-09 ENCOUNTER — PATIENT MESSAGE (OUTPATIENT)
Dept: FAMILY MEDICINE | Facility: CLINIC | Age: 49
End: 2019-11-09

## 2019-11-11 RX ORDER — PEN NEEDLE, DIABETIC 30 GX3/16"
NEEDLE, DISPOSABLE MISCELLANEOUS
Qty: 100 EACH | Refills: 11 | Status: SHIPPED | OUTPATIENT
Start: 2019-11-11 | End: 2020-05-20 | Stop reason: SDUPTHER

## 2019-11-19 ENCOUNTER — TELEPHONE (OUTPATIENT)
Dept: CARDIOLOGY | Facility: CLINIC | Age: 49
End: 2019-11-19

## 2019-11-19 NOTE — TELEPHONE ENCOUNTER
Pr requesting neuro referral sent to [facility].  Referral sent as requested.       ----- Message -----  From: Sabina Villalba  Sent: 11/18/2019  12:31 PM CST  To: Parth Milian Staff    ..Type: Needs Medical Advice    Who Called: Carolina/Wife  Best Call Back Number:  or   Additional Information: Carolina would like to speak with a nurse regarding a referral for her  to see a neurologist in [place].  Please call to advise  Thanks

## 2019-11-25 ENCOUNTER — PATIENT MESSAGE (OUTPATIENT)
Dept: FAMILY MEDICINE | Facility: CLINIC | Age: 49
End: 2019-11-25

## 2019-11-25 ENCOUNTER — TELEPHONE (OUTPATIENT)
Dept: FAMILY MEDICINE | Facility: CLINIC | Age: 49
End: 2019-11-25

## 2019-11-25 RX ORDER — AMOXICILLIN 875 MG/1
875 TABLET, FILM COATED ORAL EVERY 12 HOURS
Qty: 20 TABLET | Refills: 0 | Status: SHIPPED | OUTPATIENT
Start: 2019-11-25 | End: 2020-04-15

## 2019-11-25 NOTE — TELEPHONE ENCOUNTER
Dark green snot, hoarse, sore throat, no cough, low grade temp, x2 days        ----- Message from Marcelle Tomas sent at 11/25/2019 10:16 AM CST -----  Type: Needs Medication ordered    Who Called:  Wife (aCrolina)  Best Call Back Number: [phone]  Additional Information: Patient is experiencing sinus issues/requesting medication be ordered/please call back to advise. (does not need cough medication)

## 2019-12-02 RX ORDER — INSULIN DETEMIR 100 [IU]/ML
INJECTION, SOLUTION SUBCUTANEOUS
Qty: 42 ML | Refills: 3 | Status: SHIPPED | OUTPATIENT
Start: 2019-12-02 | End: 2020-02-28

## 2020-01-07 ENCOUNTER — TELEPHONE (OUTPATIENT)
Dept: FAMILY MEDICINE | Facility: CLINIC | Age: 50
End: 2020-01-07

## 2020-01-07 RX ORDER — MONTELUKAST SODIUM 10 MG/1
10 TABLET ORAL NIGHTLY
Qty: 30 TABLET | Refills: 0 | Status: SHIPPED | OUTPATIENT
Start: 2020-01-07 | End: 2020-02-06

## 2020-01-07 NOTE — TELEPHONE ENCOUNTER
Please advise: states singular  makes him nauseated, also states has chest cold and sinus drainage can you call something in to pharmacy for this please.    ----- Message from Nyla Leslie sent at 1/7/2020  4:23 PM CST -----  Contact: Carolina wife  Type: Needs Medical Advice    Who Called:  Carolina  Pharmacy name and phone #:   [id number] - [place], [place] - [address]  [address]  [place] [address]  Phone: [phone] Fax: [phone]      Best Call Back Number: [phone]  Additional Information: Pls call Carolina regarding pt's singulair/  He cannot take the rx. He needs something else. Pls call to adv

## 2020-01-07 NOTE — TELEPHONE ENCOUNTER
Pt is requesting a new rx sent to the pharmacy for sinus pressure and congestion.   Please advise, thank you.         ----- Message from Nyla Leslie sent at 1/7/2020  8:02 AM CST -----  Contact: Carolina wife  Type: Needs Medical Advice    Who Called:  Carolina  Symptoms (please be specific):  Sinus/head pressure, chest congestion, coughing up mucus  How long has patient had these symptoms:  yesterday  Pharmacy name and phone #:    [id number] - [place] - [address]  [address]  [place] [address]  Phone: [phone] Fax: [phone]      Best Call Back Number: [phone] or [phone]  Additional Information: Pt is requesting to have something called in for his symptoms.

## 2020-01-08 RX ORDER — AZITHROMYCIN 250 MG/1
TABLET, FILM COATED ORAL
Qty: 6 TABLET | Refills: 0 | Status: SHIPPED | OUTPATIENT
Start: 2020-01-08 | End: 2020-04-15

## 2020-01-08 RX ORDER — PROMETHAZINE HYDROCHLORIDE AND DEXTROMETHORPHAN HYDROBROMIDE 6.25; 15 MG/5ML; MG/5ML
5 SYRUP ORAL EVERY 6 HOURS PRN
Qty: 240 ML | Refills: 1 | Status: SHIPPED | OUTPATIENT
Start: 2020-01-08 | End: 2020-04-15

## 2020-01-13 ENCOUNTER — LAB VISIT (OUTPATIENT)
Dept: LAB | Facility: HOSPITAL | Age: 50
End: 2020-01-13
Attending: FAMILY MEDICINE
Payer: COMMERCIAL

## 2020-01-13 DIAGNOSIS — E11.9 TYPE 2 DIABETES MELLITUS WITHOUT COMPLICATION, WITHOUT LONG-TERM CURRENT USE OF INSULIN: ICD-10-CM

## 2020-01-13 LAB
ALBUMIN SERPL BCP-MCNC: 4.4 G/DL (ref 3.5–5.2)
ALP SERPL-CCNC: 63 U/L (ref 55–135)
ALT SERPL W/O P-5'-P-CCNC: 23 U/L (ref 10–44)
ANION GAP SERPL CALC-SCNC: 3 MMOL/L (ref 8–16)
AST SERPL-CCNC: 19 U/L (ref 10–40)
BASOPHILS # BLD AUTO: 0.06 K/UL (ref 0–0.2)
BASOPHILS NFR BLD: 0.9 % (ref 0–1.9)
BILIRUB SERPL-MCNC: 1 MG/DL (ref 0.1–1)
BUN SERPL-MCNC: 18 MG/DL (ref 6–20)
CALCIUM SERPL-MCNC: 9 MG/DL (ref 8.7–10.5)
CHLORIDE SERPL-SCNC: 98 MMOL/L (ref 95–110)
CHOLEST SERPL-MCNC: 156 MG/DL (ref 120–199)
CHOLEST/HDLC SERPL: 4.3 {RATIO} (ref 2–5)
CO2 SERPL-SCNC: 32 MMOL/L (ref 23–29)
CREAT SERPL-MCNC: 1.1 MG/DL (ref 0.5–1.4)
DIFFERENTIAL METHOD: ABNORMAL
EOSINOPHIL # BLD AUTO: 0.1 K/UL (ref 0–0.5)
EOSINOPHIL NFR BLD: 2 % (ref 0–8)
ERYTHROCYTE [DISTWIDTH] IN BLOOD BY AUTOMATED COUNT: 11.4 % (ref 11.5–14.5)
EST. GFR  (AFRICAN AMERICAN): >60 ML/MIN/1.73 M^2
EST. GFR  (NON AFRICAN AMERICAN): >60 ML/MIN/1.73 M^2
ESTIMATED AVG GLUCOSE: 180 MG/DL (ref 68–131)
GLUCOSE SERPL-MCNC: 151 MG/DL (ref 70–110)
HBA1C MFR BLD HPLC: 7.9 % (ref 4.5–6.2)
HCT VFR BLD AUTO: 46.3 % (ref 40–54)
HDLC SERPL-MCNC: 36 MG/DL (ref 40–75)
HDLC SERPL: 23.1 % (ref 20–50)
HGB BLD-MCNC: 15.7 G/DL (ref 14–18)
IMM GRANULOCYTES # BLD AUTO: 0.01 K/UL (ref 0–0.04)
IMM GRANULOCYTES NFR BLD AUTO: 0.2 % (ref 0–0.5)
LDLC SERPL CALC-MCNC: 96.2 MG/DL (ref 63–159)
LYMPHOCYTES # BLD AUTO: 1.6 K/UL (ref 1–4.8)
LYMPHOCYTES NFR BLD: 23.9 % (ref 18–48)
MCH RBC QN AUTO: 29.7 PG (ref 27–31)
MCHC RBC AUTO-ENTMCNC: 33.9 G/DL (ref 32–36)
MCV RBC AUTO: 88 FL (ref 82–98)
MONOCYTES # BLD AUTO: 0.7 K/UL (ref 0.3–1)
MONOCYTES NFR BLD: 10.7 % (ref 4–15)
NEUTROPHILS # BLD AUTO: 4.2 K/UL (ref 1.8–7.7)
NEUTROPHILS NFR BLD: 62.3 % (ref 38–73)
NONHDLC SERPL-MCNC: 120 MG/DL
NRBC BLD-RTO: 0 /100 WBC
PLATELET # BLD AUTO: 340 K/UL (ref 150–350)
PMV BLD AUTO: 9.4 FL (ref 9.2–12.9)
POTASSIUM SERPL-SCNC: 4.1 MMOL/L (ref 3.5–5.1)
PROT SERPL-MCNC: 7.9 G/DL (ref 6–8.4)
RBC # BLD AUTO: 5.28 M/UL (ref 4.6–6.2)
SODIUM SERPL-SCNC: 133 MMOL/L (ref 136–145)
TRIGL SERPL-MCNC: 119 MG/DL (ref 30–150)
TSH SERPL DL<=0.005 MIU/L-ACNC: 2 UIU/ML (ref 0.34–5.6)
WBC # BLD AUTO: 6.66 K/UL (ref 3.9–12.7)

## 2020-01-13 PROCEDURE — 85025 COMPLETE CBC W/AUTO DIFF WBC: CPT

## 2020-01-13 PROCEDURE — 80053 COMPREHEN METABOLIC PANEL: CPT

## 2020-01-13 PROCEDURE — 84443 ASSAY THYROID STIM HORMONE: CPT

## 2020-01-13 PROCEDURE — 83036 HEMOGLOBIN GLYCOSYLATED A1C: CPT

## 2020-01-13 PROCEDURE — 36415 COLL VENOUS BLD VENIPUNCTURE: CPT

## 2020-01-13 PROCEDURE — 80061 LIPID PANEL: CPT

## 2020-01-15 ENCOUNTER — OFFICE VISIT (OUTPATIENT)
Dept: FAMILY MEDICINE | Facility: CLINIC | Age: 50
End: 2020-01-15
Payer: COMMERCIAL

## 2020-01-15 VITALS
HEIGHT: 70 IN | TEMPERATURE: 97 F | WEIGHT: 208.44 LBS | BODY MASS INDEX: 29.84 KG/M2 | HEART RATE: 71 BPM | OXYGEN SATURATION: 97 % | SYSTOLIC BLOOD PRESSURE: 110 MMHG | DIASTOLIC BLOOD PRESSURE: 71 MMHG | RESPIRATION RATE: 14 BRPM

## 2020-01-15 DIAGNOSIS — E11.9 TYPE 2 DIABETES MELLITUS WITHOUT COMPLICATION, WITHOUT LONG-TERM CURRENT USE OF INSULIN: Primary | ICD-10-CM

## 2020-01-15 PROCEDURE — 99213 PR OFFICE/OUTPT VISIT, EST, LEVL III, 20-29 MIN: ICD-10-PCS | Mod: S$GLB,,, | Performed by: FAMILY MEDICINE

## 2020-01-15 PROCEDURE — 99999 PR PBB SHADOW E&M-EST. PATIENT-LVL III: CPT | Mod: PBBFAC,,, | Performed by: FAMILY MEDICINE

## 2020-01-15 PROCEDURE — 99213 OFFICE O/P EST LOW 20 MIN: CPT | Mod: S$GLB,,, | Performed by: FAMILY MEDICINE

## 2020-01-15 PROCEDURE — 99999 PR PBB SHADOW E&M-EST. PATIENT-LVL III: ICD-10-PCS | Mod: PBBFAC,,, | Performed by: FAMILY MEDICINE

## 2020-01-15 RX ORDER — GABAPENTIN 600 MG/1
600 TABLET ORAL 3 TIMES DAILY
COMMUNITY
Start: 2019-12-22 | End: 2020-03-30 | Stop reason: SDUPTHER

## 2020-01-15 NOTE — PROGRESS NOTES
Subjective:       Patient ID: Umberto Okeefe is a 49 y.o. male.    Chief Complaint: Follow-up (3 month f/u )    In regard to the patient's diabetes, patient reports that blood sugars have been well controlled. Patient Denies hypoglycemia. Patient is currently on insulin therapy. Patient is on ACE/ARB and is on statin. Last a1c was Hemoglobin A1C       Date                     Value               Ref Range           Status                01/13/2020               7.9 (H)             4.5 - 6.2 %         Final              Comment:    According to ADA guidelines, hemoglobin A1C <7.0% represents  optimal control in non-pregnant diabetic patients.  Different  metrics may apply to specific populations.   Standards of Medical Care in Diabetes - 2016.  For the purpose of screening for the presence of diabetes:  <5.7%     Consistent with the absence of diabetes  5.7-6.4%  Consistent with increasing risk for diabetes   (prediabetes)  >or=6.5%  Consistent with diabetes  Currently no consensus exists for use of hemoglobin A1C  for diagnosis of diabetes for children.         10/11/2019               7.5 (H)             4.5 - 6.2 %         Final              Comment:    According to ADA guidelines, hemoglobin A1C <7.0% represents  optimal control in non-pregnant diabetic patients.  Different  metrics may apply to specific populations.   Standards of Medical Care in Diabetes - 2016.  For the purpose of screening for the presence of diabetes:  <5.7%     Consistent with the absence of diabetes  5.7-6.4%  Consistent with increasing risk for diabetes   (prediabetes)  >or=6.5%  Consistent with diabetes  Currently no consensus exists for use of hemoglobin A1C  for diagnosis of diabetes for children.         06/24/2019               9.9 (H)             4.5 - 6.2 %         Final              Comment:    According to ADA guidelines, hemoglobin A1C <7.0% represents  optimal control in non-pregnant diabetic patients.  Different  metrics  may apply to specific populations.   Standards of Medical Care in Diabetes - 2016.  For the purpose of screening for the presence of diabetes:  <5.7%     Consistent with the absence of diabetes  5.7-6.4%  Consistent with increasing risk for diabetes   (prediabetes)  >or=6.5%  Consistent with diabetes  Currently no consensus exists for use of hemoglobin A1C  for diagnosis of diabetes for children.    ----------    In regards to the patient's dyslipidemia, patient reports compliance with medication regimen without side effects.    In regards to the patient's hypertension, patient denies chest pain/sob, and reports compliance with medication regimen.    Review of Systems   Constitutional: Negative for activity change, appetite change, fatigue and fever.   HENT: Negative for congestion, dental problem, ear discharge, hearing loss, postnasal drip, sinus pain, sneezing and trouble swallowing.    Eyes: Negative for photophobia and discharge.   Respiratory: Negative for apnea, cough and shortness of breath.    Cardiovascular: Negative for chest pain.   Gastrointestinal: Negative for abdominal distention, abdominal pain, blood in stool, diarrhea, nausea and vomiting.   Endocrine: Negative for cold intolerance.   Genitourinary: Negative for difficulty urinating, flank pain, frequency, hematuria and testicular pain.   Musculoskeletal: Negative for arthralgias and myalgias.   Skin: Negative for color change.   Allergic/Immunologic: Negative for environmental allergies, food allergies and immunocompromised state.   Neurological: Negative for dizziness, syncope, numbness and headaches.   Hematological: Negative for adenopathy. Does not bruise/bleed easily.   Psychiatric/Behavioral: Negative for agitation, confusion, decreased concentration, hallucinations, self-injury and sleep disturbance.   All other systems reviewed and are negative.        Reviewed family, medical, surgical, and social history.    Objective:      /71  "(BP Location: Left arm, Patient Position: Sitting, BP Method: Large (Automatic))   Pulse 71   Temp 97.2 °F (36.2 °C) (Tympanic)   Resp 14   Ht 5' 10" (1.778 m)   Wt 94.5 kg (208 lb 7 oz)   SpO2 97%   BMI 29.91 kg/m²   Physical Exam    Assessment:       1. Type 2 diabetes mellitus without complication, without long-term current use of insulin        Plan:       Type 2 diabetes mellitus without complication, without long-term current use of insulin  -     Lipid panel; Future; Expected date: 04/15/2020  -     CBC auto differential; Future; Expected date: 04/15/2020  -     Comprehensive metabolic panel; Future; Expected date: 04/15/2020  -     TSH; Future; Expected date: 04/15/2020  -     Microalbumin/creatinine urine ratio; Future; Expected date: 04/15/2020  -     Hemoglobin A1c; Future; Expected date: 04/15/2020            Risks, benefits, and side effects were discussed with the patient. All questions were answered to the fullest satisfaction of the patient, and pt verbalized understanding and agreement to treatment plan. Pt was to call with any new or worsening symptoms, or present to the ER.  "

## 2020-02-26 ENCOUNTER — TELEPHONE (OUTPATIENT)
Dept: FAMILY MEDICINE | Facility: CLINIC | Age: 50
End: 2020-02-26

## 2020-02-26 NOTE — TELEPHONE ENCOUNTER
GENE.        ----- Message from Felisa Muñoz sent at 2/26/2020  8:20 AM CST -----  Contact: self [phone]  He said that he received a message telling him that the flu shot he received was OK.  So he wants to know if he still has to come in this morning? Please call him, he has a 9am appt.  Thank you!

## 2020-02-28 DIAGNOSIS — E11.9 TYPE 2 DIABETES MELLITUS WITHOUT COMPLICATION, WITHOUT LONG-TERM CURRENT USE OF INSULIN: Primary | ICD-10-CM

## 2020-03-28 ENCOUNTER — PATIENT MESSAGE (OUTPATIENT)
Dept: PODIATRY | Facility: CLINIC | Age: 50
End: 2020-03-28

## 2020-03-30 DIAGNOSIS — E11.49 TYPE II DIABETES MELLITUS WITH NEUROLOGICAL MANIFESTATIONS: ICD-10-CM

## 2020-03-30 DIAGNOSIS — G57.93 NEUROPATHIC PAIN OF BOTH FEET: Primary | ICD-10-CM

## 2020-03-30 RX ORDER — GABAPENTIN 600 MG/1
600 TABLET ORAL 3 TIMES DAILY
Qty: 90 TABLET | Refills: 0 | Status: SHIPPED | OUTPATIENT
Start: 2020-03-30 | End: 2020-04-23

## 2020-03-30 NOTE — TELEPHONE ENCOUNTER
Refill request:    Gabapentin 600 mg  One po (600mg) TID  #90 with four refills  Start date was 10/28/2019    Last office visit on 9/26/2019    Pharmacy:  [facility] [place]

## 2020-03-30 NOTE — TELEPHONE ENCOUNTER
Advise pt he is due for 6 month followup this month.  Under the circumstances I will call in a refill gabapentin for 1 month to [facility].  Pt will need office or virtual visit follow up in 1 month before further refills. Thanks

## 2020-04-07 ENCOUNTER — PATIENT MESSAGE (OUTPATIENT)
Dept: FAMILY MEDICINE | Facility: CLINIC | Age: 50
End: 2020-04-07

## 2020-04-13 ENCOUNTER — LAB VISIT (OUTPATIENT)
Dept: LAB | Facility: HOSPITAL | Age: 50
End: 2020-04-13
Attending: FAMILY MEDICINE
Payer: COMMERCIAL

## 2020-04-13 ENCOUNTER — PATIENT OUTREACH (OUTPATIENT)
Dept: ADMINISTRATIVE | Facility: HOSPITAL | Age: 50
End: 2020-04-13

## 2020-04-13 DIAGNOSIS — E11.9 TYPE 2 DIABETES MELLITUS WITHOUT COMPLICATION, WITHOUT LONG-TERM CURRENT USE OF INSULIN: ICD-10-CM

## 2020-04-13 LAB
ALBUMIN SERPL BCP-MCNC: 4.4 G/DL (ref 3.5–5.2)
ALP SERPL-CCNC: 72 U/L (ref 55–135)
ALT SERPL W/O P-5'-P-CCNC: 25 U/L (ref 10–44)
ANION GAP SERPL CALC-SCNC: 7 MMOL/L (ref 8–16)
AST SERPL-CCNC: 24 U/L (ref 10–40)
BASOPHILS # BLD AUTO: 0.05 K/UL (ref 0–0.2)
BASOPHILS NFR BLD: 0.9 % (ref 0–1.9)
BILIRUB SERPL-MCNC: 0.9 MG/DL (ref 0.1–1)
BUN SERPL-MCNC: 21 MG/DL (ref 6–20)
CALCIUM SERPL-MCNC: 9 MG/DL (ref 8.7–10.5)
CHLORIDE SERPL-SCNC: 104 MMOL/L (ref 95–110)
CHOLEST SERPL-MCNC: 140 MG/DL (ref 120–199)
CHOLEST/HDLC SERPL: 4.2 {RATIO} (ref 2–5)
CO2 SERPL-SCNC: 24 MMOL/L (ref 23–29)
CREAT SERPL-MCNC: 1.3 MG/DL (ref 0.5–1.4)
DIFFERENTIAL METHOD: NORMAL
EOSINOPHIL # BLD AUTO: 0.1 K/UL (ref 0–0.5)
EOSINOPHIL NFR BLD: 2.5 % (ref 0–8)
ERYTHROCYTE [DISTWIDTH] IN BLOOD BY AUTOMATED COUNT: 12.1 % (ref 11.5–14.5)
EST. GFR  (AFRICAN AMERICAN): >60 ML/MIN/1.73 M^2
EST. GFR  (NON AFRICAN AMERICAN): >60 ML/MIN/1.73 M^2
ESTIMATED AVG GLUCOSE: 186 MG/DL (ref 68–131)
GLUCOSE SERPL-MCNC: 169 MG/DL (ref 70–110)
HBA1C MFR BLD HPLC: 8.1 % (ref 4.5–6.2)
HCT VFR BLD AUTO: 44.6 % (ref 40–54)
HDLC SERPL-MCNC: 33 MG/DL (ref 40–75)
HDLC SERPL: 23.6 % (ref 20–50)
HGB BLD-MCNC: 15.1 G/DL (ref 14–18)
IMM GRANULOCYTES # BLD AUTO: 0.01 K/UL (ref 0–0.04)
IMM GRANULOCYTES NFR BLD AUTO: 0.2 % (ref 0–0.5)
LDLC SERPL CALC-MCNC: 87.4 MG/DL (ref 63–159)
LYMPHOCYTES # BLD AUTO: 1.6 K/UL (ref 1–4.8)
LYMPHOCYTES NFR BLD: 29 % (ref 18–48)
MCH RBC QN AUTO: 29.5 PG (ref 27–31)
MCHC RBC AUTO-ENTMCNC: 33.9 G/DL (ref 32–36)
MCV RBC AUTO: 87 FL (ref 82–98)
MONOCYTES # BLD AUTO: 0.7 K/UL (ref 0.3–1)
MONOCYTES NFR BLD: 12.7 % (ref 4–15)
NEUTROPHILS # BLD AUTO: 3 K/UL (ref 1.8–7.7)
NEUTROPHILS NFR BLD: 54.7 % (ref 38–73)
NONHDLC SERPL-MCNC: 107 MG/DL
NRBC BLD-RTO: 0 /100 WBC
PLATELET # BLD AUTO: 344 K/UL (ref 150–350)
PMV BLD AUTO: 9.3 FL (ref 9.2–12.9)
POTASSIUM SERPL-SCNC: 4.3 MMOL/L (ref 3.5–5.1)
PROT SERPL-MCNC: 7.7 G/DL (ref 6–8.4)
RBC # BLD AUTO: 5.12 M/UL (ref 4.6–6.2)
SODIUM SERPL-SCNC: 135 MMOL/L (ref 136–145)
TRIGL SERPL-MCNC: 98 MG/DL (ref 30–150)
TSH SERPL DL<=0.005 MIU/L-ACNC: 1.94 UIU/ML (ref 0.34–5.6)
WBC # BLD AUTO: 5.52 K/UL (ref 3.9–12.7)

## 2020-04-13 PROCEDURE — 83036 HEMOGLOBIN GLYCOSYLATED A1C: CPT

## 2020-04-13 PROCEDURE — 80053 COMPREHEN METABOLIC PANEL: CPT

## 2020-04-13 PROCEDURE — 85025 COMPLETE CBC W/AUTO DIFF WBC: CPT

## 2020-04-13 PROCEDURE — 84443 ASSAY THYROID STIM HORMONE: CPT

## 2020-04-13 PROCEDURE — 80061 LIPID PANEL: CPT

## 2020-04-13 PROCEDURE — 36415 COLL VENOUS BLD VENIPUNCTURE: CPT

## 2020-04-13 NOTE — LETTER
FAX      AUTHORIZATION FOR RELEASE OF   CONFIDENTIAL INFORMATION            Dr. OSEAS GARCIA      We are seeing Umberto Okeefe, date of birth 1970, in the clinic at [facility]. Maicol Alba MD is the patient's PCP. Umberto Okeefe has an outstanding lab/procedure at the time we reviewed her chart. In order to help keep her health information updated, she has authorized us to request the following medical record(s):        (  )  MAMMOGRAM                                      (  )  COLONOSCOPY      (  )  PAP SMEAR                                          (  )  MOST RECENT LAB RESULTS     (  )  DEXA SCAN                                          ( X )  DIABETIC EYE EXAM            (  )  DIABETIC FOOT EXAM                        (  )  MOST RECENT A1c, LIPID, &          URINE MICRO-ALBUMIN     (  )  OUTSIDE IMMUNIZATIONS                 (  )  _______________        Please fax records to [facility]  [phone]     If you have any questions, please contact Mitzi at [phone].      Mitzi Lira L.P.N. Clinical Care Coordinator  [address] [place] [address], [place] [address] [phone]

## 2020-04-13 NOTE — PROGRESS NOTES
Population Health Outreach.  Spoke with pt about overdue DM Eye Exam. He stated that he had it in 2018 and uses Dr Abdoulaye Garcia.  04/13/20  FAX SENT TO DR GARCIA FOR MOST RECENT DM EYE EXAM RESULTS

## 2020-04-14 ENCOUNTER — PATIENT OUTREACH (OUTPATIENT)
Dept: ADMINISTRATIVE | Facility: HOSPITAL | Age: 50
End: 2020-04-14

## 2020-04-15 ENCOUNTER — PATIENT MESSAGE (OUTPATIENT)
Dept: FAMILY MEDICINE | Facility: CLINIC | Age: 50
End: 2020-04-15

## 2020-04-15 ENCOUNTER — OFFICE VISIT (OUTPATIENT)
Dept: FAMILY MEDICINE | Facility: CLINIC | Age: 50
End: 2020-04-15
Payer: COMMERCIAL

## 2020-04-15 DIAGNOSIS — E11.9 TYPE 2 DIABETES MELLITUS WITHOUT COMPLICATION, WITHOUT LONG-TERM CURRENT USE OF INSULIN: Primary | ICD-10-CM

## 2020-04-15 PROCEDURE — 99213 PR OFFICE/OUTPT VISIT, EST, LEVL III, 20-29 MIN: ICD-10-PCS | Mod: 95,,, | Performed by: FAMILY MEDICINE

## 2020-04-15 PROCEDURE — 99213 OFFICE O/P EST LOW 20 MIN: CPT | Mod: 95,,, | Performed by: FAMILY MEDICINE

## 2020-04-15 NOTE — PROGRESS NOTES
Patient ID: Umberto Okeefe is a 50 y.o. male.    Chief Complaint: follow up visit/med check  In regard to the patient's diabetes, patient reports that blood sugars have been well controlled. Patient Denies hypoglycemia. Patient is currently on insulin therapy. Patient is on ACE/ARB and is on statin. Last a1c was Hemoglobin A1C       Date                     Value               Ref Range           Status                04/13/2020               8.1 (H)             4.5 - 6.2 %         Final              Comment:    According to ADA guidelines, hemoglobin A1C <7.0% represents  optimal control in non-pregnant diabetic patients.  Different  metrics may apply to specific populations.   Standards of Medical Care in Diabetes - 2016.  For the purpose of screening for the presence of diabetes:  <5.7%     Consistent with the absence of diabetes  5.7-6.4%  Consistent with increasing risk for diabetes   (prediabetes)  >or=6.5%  Consistent with diabetes  Currently no consensus exists for use of hemoglobin A1C  for diagnosis of diabetes for children.         01/13/2020               7.9 (H)             4.5 - 6.2 %         Final              Comment:    According to ADA guidelines, hemoglobin A1C <7.0% represents  optimal control in non-pregnant diabetic patients.  Different  metrics may apply to specific populations.   Standards of Medical Care in Diabetes - 2016.  For the purpose of screening for the presence of diabetes:  <5.7%     Consistent with the absence of diabetes  5.7-6.4%  Consistent with increasing risk for diabetes   (prediabetes)  >or=6.5%  Consistent with diabetes  Currently no consensus exists for use of hemoglobin A1C  for diagnosis of diabetes for children.         10/11/2019               7.5 (H)             4.5 - 6.2 %         Final              Comment:    According to ADA guidelines, hemoglobin A1C <7.0% represents  optimal control in non-pregnant diabetic patients.  Different  metrics may apply to  specific populations.   Standards of Medical Care in Diabetes - 2016.  For the purpose of screening for the presence of diabetes:  <5.7%     Consistent with the absence of diabetes  5.7-6.4%  Consistent with increasing risk for diabetes   (prediabetes)  >or=6.5%  Consistent with diabetes  Currently no consensus exists for use of hemoglobin A1C  for diagnosis of diabetes for children.    ----------      Review of Systems   Constitutional: Negative for activity change, appetite change, fatigue and fever.   HENT: Negative for congestion, dental problem, ear discharge, hearing loss, postnasal drip, sinus pain, sneezing and trouble swallowing.    Eyes: Negative for photophobia and discharge.   Respiratory: Negative for apnea, cough and shortness of breath.    Cardiovascular: Negative for chest pain.   Gastrointestinal: Negative for abdominal distention, abdominal pain, blood in stool, diarrhea, nausea and vomiting.   Endocrine: Negative for cold intolerance.   Genitourinary: Negative for difficulty urinating, flank pain, frequency, hematuria and testicular pain.   Musculoskeletal: Negative for arthralgias and myalgias.   Skin: Negative for color change.   Allergic/Immunologic: Negative for environmental allergies, food allergies and immunocompromised state.   Neurological: Negative for dizziness, syncope, numbness and headaches.   Hematological: Negative for adenopathy. Does not bruise/bleed easily.   Psychiatric/Behavioral: Negative for agitation, confusion, decreased concentration, hallucinations, self-injury and sleep disturbance.   All other systems reviewed and are negative.        Reviewed family, medical, surgical, and social history.    Objective:        patient is speaking full sentences  mood and behavior appropriate  no signs of distress  no wheezing heard  No audible congestion in voice  No coughing on the phone  patient doesnt see pus on the tonsils  can move neck in all directions without pain  Reports  able to walk normally  able to move all extremities without weakness  reports facial muscles look symmetrical  Patient has no tenderness over the abdomen with pressure  no CVA tenderness according to patient  Assessment:       1. Type 2 diabetes mellitus without complication, without long-term current use of insulin        Plan:       Type 2 diabetes mellitus without complication, without long-term current use of insulin            Risks, benefits, and side effects were discussed with the patient. All questions were answered to the fullest satisfaction of the patient, and pt verbalized understanding and agreement to treatment plan. Pt was to call with any new or worsening symptoms, or present to the ER.    The patient location is:  Patient Home   Visit type: Virtual visit with synchronous audio and video  Each patient to whom he or she provides medical services by telemedicine is:  (1) informed of the relationship between the physician and patient and the respective role of any other health care provider with respect to management of the patient; and (2) notified that he or she may decline to receive medical services by telemedicine and may withdraw from such care at any time.

## 2020-04-21 ENCOUNTER — PATIENT OUTREACH (OUTPATIENT)
Dept: ADMINISTRATIVE | Facility: OTHER | Age: 50
End: 2020-04-21

## 2020-04-23 ENCOUNTER — OFFICE VISIT (OUTPATIENT)
Dept: PODIATRY | Facility: CLINIC | Age: 50
End: 2020-04-23
Payer: COMMERCIAL

## 2020-04-23 VITALS
DIASTOLIC BLOOD PRESSURE: 74 MMHG | BODY MASS INDEX: 29.78 KG/M2 | WEIGHT: 208 LBS | RESPIRATION RATE: 20 BRPM | OXYGEN SATURATION: 96 % | HEART RATE: 84 BPM | TEMPERATURE: 99 F | SYSTOLIC BLOOD PRESSURE: 124 MMHG | HEIGHT: 70 IN

## 2020-04-23 DIAGNOSIS — M79.671 PAIN IN BOTH FEET: ICD-10-CM

## 2020-04-23 DIAGNOSIS — M79.672 PAIN IN BOTH FEET: ICD-10-CM

## 2020-04-23 DIAGNOSIS — E11.49 TYPE II DIABETES MELLITUS WITH NEUROLOGICAL MANIFESTATIONS: Primary | ICD-10-CM

## 2020-04-23 DIAGNOSIS — G57.93 NEUROPATHIC PAIN OF BOTH FEET: ICD-10-CM

## 2020-04-23 PROCEDURE — 99999 PR PBB SHADOW E&M-EST. PATIENT-LVL III: ICD-10-PCS | Mod: PBBFAC,,, | Performed by: PODIATRIST

## 2020-04-23 PROCEDURE — 99214 OFFICE O/P EST MOD 30 MIN: CPT | Mod: S$GLB,,, | Performed by: PODIATRIST

## 2020-04-23 PROCEDURE — 99999 PR PBB SHADOW E&M-EST. PATIENT-LVL III: CPT | Mod: PBBFAC,,, | Performed by: PODIATRIST

## 2020-04-23 PROCEDURE — 99214 PR OFFICE/OUTPT VISIT, EST, LEVL IV, 30-39 MIN: ICD-10-PCS | Mod: S$GLB,,, | Performed by: PODIATRIST

## 2020-04-23 RX ORDER — GABAPENTIN 800 MG/1
800 TABLET ORAL 3 TIMES DAILY
Qty: 90 TABLET | Refills: 3 | Status: SHIPPED | OUTPATIENT
Start: 2020-04-23 | End: 2020-05-21 | Stop reason: SDUPTHER

## 2020-04-23 RX ORDER — TOPIRAMATE 100 MG/1
TABLET, FILM COATED ORAL
COMMUNITY
Start: 2020-04-06 | End: 2020-04-23

## 2020-04-23 RX ORDER — TOPIRAMATE 50 MG/1
TABLET, FILM COATED ORAL
COMMUNITY
Start: 2020-04-06 | End: 2020-04-23

## 2020-04-23 NOTE — LETTER
April 26, 2020      Maicol Alba MD  [address]  [place] [address]           [facility]  [address] [place] [address]  Phone: [phone]  Fax: [phone]          Patient: Umberto Okeefe   MR Number: [id number]   YOB: 1970   Date of Visit: 4/23/2020       Dear Dr. Maicol Alba:    Thank you for referring Umberto Okeefe to me for evaluation. Attached you will find relevant portions of my assessment and plan of care.    If you have questions, please do not hesitate to call me. I look forward to following Umberto Okeefe along with you.    Sincerely,    Galilea Thomas, DPSOLANGE    Enclosure  CC:  No Recipients    If you would like to receive this communication electronically, please contact [email or web address] or [phone] to request more information on [email or web address] access.    For providers and/or their staff who would like to refer a patient to [facility], please contact us through our one-stop-shop provider referral line, [facility], at [phone].    If you feel you have received this communication in error or would no longer like to receive these types of communications, please e-mail [email or web address]

## 2020-04-26 NOTE — PROGRESS NOTES
"Subjective:       Patient ID: Umberto Okeefe is a 50 y.o. male.    Chief Complaint: Follow-up (Neuropathic pain) and Diabetic Foot Exam  Patient  presents for follow-up neuropathic pain in both feet and diabetic foot exam.  Patient has approximately 1 year history of type 2 diabetes. Neuropathic pain in his feet has been present for 3-4 years. He feels diabetes is fairly well controlled, does not know his A1c.  Glucose was 157 this morning at home.   Patient did see neurologist, Dr. Viveros, who started him on Topamax 50 mg 1 daily.  Patient relates he only took it for a few days and had significant side effects.  He has not taken it since.  He has noted 600 mg gabapentin works the best, it has controlled his pain fairly well over the last 5 months, some increase in pain noted last 4 weeks.   Patient denies any side effects when he initially started gabapentin or when we increased it 6 months ago.        Past Medical History:   Diagnosis Date    Diabetes mellitus, type 2     Hypertension     Neuropathy     Restless leg syndrome      Past Surgical History:   Procedure Laterality Date    KNEE SURGERY           Current Outpatient Medications   Medication Sig Dispense Refill    atorvastatin (LIPITOR) 40 MG tablet Take 1 tablet (40 mg total) by mouth once daily. 90 tablet 3    diclofenac sodium (VOLTAREN) 1 % Gel Apply 2 g topically 3 (three) times daily. 100 g 4    gabapentin (NEURONTIN) 800 MG tablet Take 1 tablet (800 mg total) by mouth 3 (three) times daily. 90 tablet 3    insulin detemir U-100 (LEVEMIR FLEXTOUCH) 100 unit/mL (3 mL) SubQ InPn pen Inject 47 Units into the skin every evening. 42.3 mL 0    lisinopril (PRINIVIL,ZESTRIL) 20 MG tablet Take 1 tablet (20 mg total) by mouth once daily. 90 tablet 3    pen needle, diabetic 31 gauge x 5/16" Ndle Use as directed. Dx: E11.9 100 each 11    THIN LANCETS 26 gauge Misc   5    TRUE METRIX GLUCOSE METER Misc   0    TRUE METRIX GLUCOSE TEST STRIP Strp " "USE AS DIRECTED TWICE DAILY  5     No current facility-administered medications for this visit.      Review of patient's allergies indicates:   Allergen Reactions    Requip [ropinirole]     Singulair [montelukast]     Topamax [topiramate]        Review of Systems   Constitutional: Negative for fever.   HENT: Negative for congestion.    Respiratory: Negative for cough and shortness of breath.    Cardiovascular: Negative for leg swelling.   Musculoskeletal: Negative for gait problem.   All other systems reviewed and are negative.          Objective:      Vitals:    04/23/20 1344   BP: 124/74   Pulse: 84   Resp: 20   Temp: 99.1 °F (37.3 °C)   TempSrc: Oral   SpO2: 96%   Weight: 94.3 kg (208 lb)   Height: 5' 10" (1.778 m)     Physical Exam   Constitutional: He appears well-developed and well-nourished. No distress.   Cardiovascular:   Pulses:       Dorsalis pedis pulses are 2+ on the right side, and 2+ on the left side.        Posterior tibial pulses are 2+ on the right side, and 2+ on the left side.   Pulmonary/Chest: Effort normal.   Musculoskeletal:        Right foot: There is decreased range of motion.        Left foot: There is decreased range of motion.   Feet:   Right Foot:   Protective Sensation: 6 sites tested. 6 sites sensed.   Skin Integrity: Negative for skin breakdown or erythema.   Left Foot:   Protective Sensation: 6 sites tested. 6 sites sensed.   Skin Integrity: Negative for skin breakdown or erythema.   Skin: Capillary refill takes 2 to 3 seconds.   Psychiatric: He has a normal mood and affect.   Nursing note and vitals reviewed.  Vascular         Normal CFT bilateral   No lower extremity edema bilateral   Pedal skin temperature and color are normal bilateral     Integumentary         Soft skin texture bilateral feet       Neurological   Gross sensation intact all areas bilateral feet.  No neuritis through web spaces. Positive painful paresthesias due to peripheral neuropathic pain in " feet    Musculoskeletal   Muscle Strength/Testing and Tone:  Intact, normal tone bilateral   Joints, Bones, and Muscles: Limited ROM 1st MPJ bilateral  AJ equinus bilateral        Walks well unassisted        Presents in appropriate shoes      Contains abnormal data Hemoglobin A1c    Ref Range & Units 13d ago 3mo ago 6mo ago   Hemoglobin A1C 4.5 - 6.2 % 8.1High   7.9High  CM 7.5High  CM      Estimated Avg Glucose 68 - 131 mg/dL 186High   180High   169High                  Assessment:       1. Type II diabetes mellitus with neurological manifestations    2. Neuropathic pain of both feet    3. Pain in both feet        Plan:            INCREASE GABAPENTIN  MG T.I.D.        Diabetic pedal exam performed.    Reviewed diabetic education and neuropathy which may not be entirely diabetic related since he had symptoms of neuropathy for several years prior to being diagnosed with diabetes.  Explained to patient however increased A1c in daily glucose contributes to neuropathic pain and it is crucial he gained tighter control of his diabetes    reviewed increasing gabapentin to 800 mg 3 times daily, increasing medication daily using any remaining 600 mg he may have left and reviewed potential side effects  Discussed benefit of tight(er) control of glucose/diabetes regarding potential foot problems    Reviewed appropriate shoes,  especially indoors to protect feet, no flat shoes, slippers or walking in sock or bare feet.    Dispense sample diabetic insole, explained the patient how gradually adjust to/ mold to his feet comfortably, always removing existing insoles first  Discussed maintenance of skin and nails and potential complications.  Reviewed need for daily foot checks and instructed patient to contact the office with any area of redness or swelling which has not improved within 3 days.  Patient was in understanding and agreement with treatment plan.  I counseled the patient on his conditions, their implications and  medical management.  Instructed patient to contact the office with any changes, questions, concerns, worsening of symptoms. Patient verbalized understanding.   Total face-to-face time, exam, assessment, treatment, discussion, documentation 25 minutes, more than half this time spent on consultation and coordination of care.  Follow up 6 months      This note was created using [employer] voice recognition software that occasionally misinterpreted phrases or words.

## 2020-05-05 ENCOUNTER — PATIENT MESSAGE (OUTPATIENT)
Dept: ADMINISTRATIVE | Facility: HOSPITAL | Age: 50
End: 2020-05-05

## 2020-05-20 ENCOUNTER — PATIENT MESSAGE (OUTPATIENT)
Dept: ADMINISTRATIVE | Facility: HOSPITAL | Age: 50
End: 2020-05-20

## 2020-05-20 ENCOUNTER — PATIENT MESSAGE (OUTPATIENT)
Dept: FAMILY MEDICINE | Facility: CLINIC | Age: 50
End: 2020-05-20

## 2020-05-20 DIAGNOSIS — E11.9 TYPE 2 DIABETES MELLITUS WITHOUT COMPLICATION, WITHOUT LONG-TERM CURRENT USE OF INSULIN: ICD-10-CM

## 2020-05-20 RX ORDER — PEN NEEDLE, DIABETIC 30 GX3/16"
NEEDLE, DISPOSABLE MISCELLANEOUS
Qty: 100 EACH | Refills: 11 | Status: SHIPPED | OUTPATIENT
Start: 2020-05-20 | End: 2020-05-21 | Stop reason: SDUPTHER

## 2020-05-20 RX ORDER — LISINOPRIL 20 MG/1
20 TABLET ORAL DAILY
Qty: 90 TABLET | Refills: 3 | Status: SHIPPED | OUTPATIENT
Start: 2020-05-20 | End: 2020-05-21 | Stop reason: SDUPTHER

## 2020-05-21 DIAGNOSIS — E11.9 TYPE 2 DIABETES MELLITUS WITHOUT COMPLICATION, WITHOUT LONG-TERM CURRENT USE OF INSULIN: ICD-10-CM

## 2020-05-22 RX ORDER — LISINOPRIL 20 MG/1
20 TABLET ORAL DAILY
Qty: 90 TABLET | Refills: 3 | Status: SHIPPED | OUTPATIENT
Start: 2020-05-22 | End: 2021-07-28 | Stop reason: SDUPTHER

## 2020-05-22 RX ORDER — PEN NEEDLE, DIABETIC 30 GX3/16"
NEEDLE, DISPOSABLE MISCELLANEOUS
Qty: 100 EACH | Refills: 11 | Status: SHIPPED | OUTPATIENT
Start: 2020-05-22 | End: 2020-08-21 | Stop reason: SDUPTHER

## 2020-05-22 RX ORDER — GABAPENTIN 800 MG/1
800 TABLET ORAL 3 TIMES DAILY
Qty: 90 TABLET | Refills: 3 | Status: SHIPPED | OUTPATIENT
Start: 2020-05-22 | End: 2020-10-03 | Stop reason: SDUPTHER

## 2020-06-22 ENCOUNTER — TELEPHONE (OUTPATIENT)
Dept: PODIATRY | Facility: CLINIC | Age: 50
End: 2020-06-22

## 2020-06-22 NOTE — TELEPHONE ENCOUNTER
----- Message from Galilea Thomas DPM sent at 6/20/2020  1:09 PM CDT -----  Regarding: FW: Appointment  Pts requesting to set up appt for office visit?  ----- Message -----  From: Fátima Gee  Sent: 6/20/2020  12:30 PM CDT  To: Galilea Thomas DPM  Subject: Appointment                                      Appointment Request From: Umberto Okeefe    With Provider: Galilea Thomas DPM [[facility]]    Preferred Date Range: Any date 8/26/2020 or later    Preferred Times: Wednesday Afternoon    Reason for visit: Office Visit    Comments:  Normal office visit

## 2020-07-06 RX ORDER — ATORVASTATIN CALCIUM 40 MG/1
40 TABLET, FILM COATED ORAL DAILY
Qty: 90 TABLET | Refills: 3 | Status: SHIPPED | OUTPATIENT
Start: 2020-07-06 | End: 2020-10-03 | Stop reason: SDUPTHER

## 2020-07-09 DIAGNOSIS — E11.9 TYPE 2 DIABETES MELLITUS WITHOUT COMPLICATION, UNSPECIFIED WHETHER LONG TERM INSULIN USE: ICD-10-CM

## 2020-07-14 ENCOUNTER — OFFICE VISIT (OUTPATIENT)
Dept: PODIATRY | Facility: CLINIC | Age: 50
End: 2020-07-14
Payer: COMMERCIAL

## 2020-07-14 ENCOUNTER — PATIENT OUTREACH (OUTPATIENT)
Dept: ADMINISTRATIVE | Facility: OTHER | Age: 50
End: 2020-07-14

## 2020-07-14 VITALS
TEMPERATURE: 99 F | WEIGHT: 208 LBS | SYSTOLIC BLOOD PRESSURE: 101 MMHG | HEART RATE: 79 BPM | RESPIRATION RATE: 20 BRPM | DIASTOLIC BLOOD PRESSURE: 70 MMHG | BODY MASS INDEX: 29.78 KG/M2 | OXYGEN SATURATION: 98 % | HEIGHT: 70 IN

## 2020-07-14 DIAGNOSIS — E11.49 TYPE II DIABETES MELLITUS WITH NEUROLOGICAL MANIFESTATIONS: Primary | ICD-10-CM

## 2020-07-14 DIAGNOSIS — G57.93 NEUROPATHIC PAIN OF BOTH FEET: ICD-10-CM

## 2020-07-14 PROCEDURE — 99214 OFFICE O/P EST MOD 30 MIN: CPT | Mod: S$GLB,,, | Performed by: PODIATRIST

## 2020-07-14 PROCEDURE — 99214 PR OFFICE/OUTPT VISIT, EST, LEVL IV, 30-39 MIN: ICD-10-PCS | Mod: S$GLB,,, | Performed by: PODIATRIST

## 2020-07-14 PROCEDURE — 99999 PR PBB SHADOW E&M-EST. PATIENT-LVL IV: ICD-10-PCS | Mod: PBBFAC,,, | Performed by: PODIATRIST

## 2020-07-14 PROCEDURE — 99999 PR PBB SHADOW E&M-EST. PATIENT-LVL IV: CPT | Mod: PBBFAC,,, | Performed by: PODIATRIST

## 2020-07-14 NOTE — PROGRESS NOTES
Requested updates within Care Everywhere.  Patient's chart was reviewed for overdue KENN topics.  Immunizations not able to be reconciled.

## 2020-07-14 NOTE — LETTER
July 16, 2020      Maicol Alba MD  [address]  [place] [address]           [facility]  [address] [facility] [place] [address]  Phone: [phone]  Fax: [phone]          Patient: Umberto Okeefe   MR Number: [id number]   YOB: 1970   Date of Visit: 7/14/2020       Dear Dr. Maicol Alba:    Thank you for referring Umberto Okeefe to me for evaluation. Attached you will find relevant portions of my assessment and plan of care.    If you have questions, please do not hesitate to call me. I look forward to following Umberto Okeefe along with you.    Sincerely,    Galilea Thomas, DPSOLANGE    Enclosure  CC:  No Recipients    If you would like to receive this communication electronically, please contact [email or web address] or [phone] to request more information on hint Link access.    For providers and/or their staff who would like to refer a patient to [facility], please contact us through our one-stop-shop provider referral line, [facility], at [phone].    If you feel you have received this communication in error or would no longer like to receive these types of communications, please e-mail [email or web address]

## 2020-07-17 NOTE — PROGRESS NOTES
"Subjective:       Patient ID: Umberto Okeefe is a 50 y.o. male.    Chief Complaint: Follow-up and Peripheral Neuropathy  Patient presents for follow-up  Did type 2 diabetes with neuropathic pain in both feet.  Patient reports improvement with gabapentin 800 mg 3 times daily, denies side effects.  This medication has worked best for him.  Neurologist has tried him on several other alternatives which were not successful and cause side effects.  Patient reported mental education with Topamax.  Reports diabetes has remained fairly well controlled, glucose 150-160 range.  He is going for blood work at the end of the month.  He remains very active, on his feet and walking all day at work in steel toed boots.        Past Medical History:   Diagnosis Date    Diabetes mellitus, type 2     Hypertension     Neuropathy     Restless leg syndrome      Past Surgical History:   Procedure Laterality Date    KNEE SURGERY           Current Outpatient Medications   Medication Sig Dispense Refill    atorvastatin (LIPITOR) 40 MG tablet Take 1 tablet (40 mg total) by mouth once daily. 90 tablet 3    diclofenac sodium (VOLTAREN) 1 % Gel Apply 2 g topically 3 (three) times daily. 100 g 4    insulin detemir U-100 (LEVEMIR FLEXTOUCH) 100 unit/mL (3 mL) SubQ InPn pen Inject 47 Units into the skin every evening. 42.3 mL 0    lisinopriL (PRINIVIL,ZESTRIL) 20 MG tablet Take 1 tablet (20 mg total) by mouth once daily. 90 tablet 3    pen needle, diabetic 31 gauge x 5/16" Ndle Use as directed. Dx: E11.9 100 each 11    THIN LANCETS 26 gauge Misc   5    TRUE METRIX GLUCOSE METER Misc   0    TRUE METRIX GLUCOSE TEST STRIP Strp USE AS DIRECTED TWICE DAILY  5    gabapentin (NEURONTIN) 800 MG tablet Take 1 tablet (800 mg total) by mouth 3 (three) times daily. 90 tablet 3     No current facility-administered medications for this visit.      Review of patient's allergies indicates:   Allergen Reactions    Topamax [topiramate]      Mental " "agitation        Review of Systems   Constitutional: Negative for fever.   HENT: Negative for congestion.    Respiratory: Negative for cough and shortness of breath.    Cardiovascular: Negative for leg swelling.   Musculoskeletal: Negative for gait problem.   All other systems reviewed and are negative.          Objective:      Vitals:    07/14/20 1520   BP: 101/70   Pulse: 79   Resp: 20   Temp: 99 °F (37.2 °C)   TempSrc: Oral   SpO2: 98%   Weight: 94.3 kg (208 lb)   Height: 5' 10" (1.778 m)     Physical Exam  Vitals signs and nursing note reviewed.   Constitutional:       General: He is not in acute distress.     Appearance: Normal appearance. He is well-developed.   Cardiovascular:      Pulses:           Dorsalis pedis pulses are 2+ on the right side and 2+ on the left side.        Posterior tibial pulses are 2+ on the right side and 2+ on the left side.   Pulmonary:      Effort: Pulmonary effort is normal.   Musculoskeletal:      Right foot: Decreased range of motion.      Left foot: Decreased range of motion.   Feet:      Right foot:      Protective Sensation: 4 sites tested. 4 sites sensed.      Skin integrity: No skin breakdown or erythema.      Left foot:      Protective Sensation: 4 sites tested. 4 sites sensed.      Skin integrity: No skin breakdown or erythema.   Skin:     Capillary Refill: Capillary refill takes 2 to 3 seconds.   Psychiatric:         Behavior: Behavior normal.     Vascular         Normal CFT bilateral   No lower extremity edema bilateral   Pedal skin temperature and color are normal bilateral     Integumentary         Soft skin texture bilateral feet       Neurological   Gross sensation intact all areas bilateral feet.  No neuritis through web spaces. Positive painful paresthesias due to peripheral neuropathic pain in feet    Musculoskeletal   Muscle Strength/Testing and Tone:  Intact, normal tone bilateral   Joints, Bones, and Muscles: Limited ROM 1st MPJ bilateral  AJ equinus " bilateral        Walks well unassisted        Presents in appropriate shoes    Hemoglobin A1c   Ref Range & Units 3mo ago      Hemoglobin A1C 4.5 - 6.2 % 8.1High                    Assessment:       1. Type II diabetes mellitus with neurological manifestations    2. Neuropathic pain of both feet        Plan:            CONTINUE GABAPENTIN  MG T.I.D.      Reviewed diabetic education, neuropathy.   Discussed benefit of tight(er) control of glucose/diabetes regarding potential foot problems especially neuropathy.   Explained to patient controlling his diabetes/A1c is the next most important part in treating his neuropathic pain.  Reviewed appropriate shoes, boots with proper insoles for work, proper shoes  indoors to protect feet, no flat shoes, slippers or walking in sock or bare feet.    Discussed maintenance of skin and nails and potential complications.    Reviewed need for daily foot checks and instructed patient to contact the office with any area of redness or swelling which has not improved within 3 days.  Patient was in understanding and agreement with treatment plan.  I counseled the patient on his conditions, their implications and medical management.  Instructed patient to contact the office with any changes, questions, concerns, worsening of symptoms. Patient verbalized understanding.   Total face-to-face time, exam, assessment, treatment, discussion, documentation 25 minutes, more than half this time spent on consultation and coordination of care.  Follow up 6 months      This note was created using [employer] voice recognition software that occasionally misinterpreted phrases or words.

## 2020-07-20 ENCOUNTER — PATIENT OUTREACH (OUTPATIENT)
Dept: ADMINISTRATIVE | Facility: HOSPITAL | Age: 50
End: 2020-07-20

## 2020-07-20 NOTE — PROGRESS NOTES
Pre-visit Chart review notification  Spoke with pt about outstanding labs and overdue DM eye exam. Appt schedule for labs on 07/30/20 at [facility] for 0820 and DM EYE [facility] scheduled for 08/03/20  In [facility] at 1540. Orders linked at this time.

## 2020-07-30 ENCOUNTER — LAB VISIT (OUTPATIENT)
Dept: LAB | Facility: HOSPITAL | Age: 50
End: 2020-07-30
Attending: FAMILY MEDICINE
Payer: COMMERCIAL

## 2020-07-30 DIAGNOSIS — E11.9 TYPE 2 DIABETES MELLITUS WITHOUT COMPLICATION, WITHOUT LONG-TERM CURRENT USE OF INSULIN: ICD-10-CM

## 2020-07-30 LAB
ALBUMIN SERPL BCP-MCNC: 4.3 G/DL (ref 3.5–5.2)
ALP SERPL-CCNC: 69 U/L (ref 55–135)
ALT SERPL W/O P-5'-P-CCNC: 25 U/L (ref 10–44)
ANION GAP SERPL CALC-SCNC: 8 MMOL/L (ref 8–16)
AST SERPL-CCNC: 21 U/L (ref 10–40)
BASOPHILS # BLD AUTO: 0.07 K/UL (ref 0–0.2)
BASOPHILS NFR BLD: 1.4 % (ref 0–1.9)
BILIRUB SERPL-MCNC: 0.6 MG/DL (ref 0.1–1)
BUN SERPL-MCNC: 22 MG/DL (ref 6–20)
CALCIUM SERPL-MCNC: 8.8 MG/DL (ref 8.7–10.5)
CHLORIDE SERPL-SCNC: 100 MMOL/L (ref 95–110)
CHOLEST SERPL-MCNC: 151 MG/DL (ref 120–199)
CHOLEST/HDLC SERPL: 5.4 {RATIO} (ref 2–5)
CO2 SERPL-SCNC: 25 MMOL/L (ref 23–29)
CREAT SERPL-MCNC: 1.2 MG/DL (ref 0.5–1.4)
DIFFERENTIAL METHOD: NORMAL
EOSINOPHIL # BLD AUTO: 0.1 K/UL (ref 0–0.5)
EOSINOPHIL NFR BLD: 2.8 % (ref 0–8)
ERYTHROCYTE [DISTWIDTH] IN BLOOD BY AUTOMATED COUNT: 12.1 % (ref 11.5–14.5)
EST. GFR  (AFRICAN AMERICAN): >60 ML/MIN/1.73 M^2
EST. GFR  (NON AFRICAN AMERICAN): >60 ML/MIN/1.73 M^2
ESTIMATED AVG GLUCOSE: 206 MG/DL (ref 68–131)
GLUCOSE SERPL-MCNC: 169 MG/DL (ref 70–110)
HBA1C MFR BLD HPLC: 8.8 % (ref 4.5–6.2)
HCT VFR BLD AUTO: 44.1 % (ref 40–54)
HDLC SERPL-MCNC: 28 MG/DL (ref 40–75)
HDLC SERPL: 18.5 % (ref 20–50)
HGB BLD-MCNC: 14.9 G/DL (ref 14–18)
IMM GRANULOCYTES # BLD AUTO: 0.01 K/UL (ref 0–0.04)
IMM GRANULOCYTES NFR BLD AUTO: 0.2 % (ref 0–0.5)
LDLC SERPL CALC-MCNC: 98.8 MG/DL (ref 63–159)
LYMPHOCYTES # BLD AUTO: 1.5 K/UL (ref 1–4.8)
LYMPHOCYTES NFR BLD: 29.7 % (ref 18–48)
MCH RBC QN AUTO: 29.5 PG (ref 27–31)
MCHC RBC AUTO-ENTMCNC: 33.8 G/DL (ref 32–36)
MCV RBC AUTO: 87 FL (ref 82–98)
MONOCYTES # BLD AUTO: 0.6 K/UL (ref 0.3–1)
MONOCYTES NFR BLD: 12.6 % (ref 4–15)
NEUTROPHILS # BLD AUTO: 2.7 K/UL (ref 1.8–7.7)
NEUTROPHILS NFR BLD: 53.3 % (ref 38–73)
NONHDLC SERPL-MCNC: 123 MG/DL
NRBC BLD-RTO: 0 /100 WBC
PLATELET # BLD AUTO: 344 K/UL (ref 150–350)
PMV BLD AUTO: 9.2 FL (ref 9.2–12.9)
POTASSIUM SERPL-SCNC: 4.3 MMOL/L (ref 3.5–5.1)
PROT SERPL-MCNC: 7.8 G/DL (ref 6–8.4)
RBC # BLD AUTO: 5.05 M/UL (ref 4.6–6.2)
SODIUM SERPL-SCNC: 133 MMOL/L (ref 136–145)
TRIGL SERPL-MCNC: 121 MG/DL (ref 30–150)
TSH SERPL DL<=0.005 MIU/L-ACNC: 1.34 UIU/ML (ref 0.34–5.6)
WBC # BLD AUTO: 4.99 K/UL (ref 3.9–12.7)

## 2020-07-30 PROCEDURE — 80061 LIPID PANEL: CPT

## 2020-07-30 PROCEDURE — 36415 COLL VENOUS BLD VENIPUNCTURE: CPT

## 2020-07-30 PROCEDURE — 82043 UR ALBUMIN QUANTITATIVE: CPT

## 2020-07-30 PROCEDURE — 80053 COMPREHEN METABOLIC PANEL: CPT

## 2020-07-30 PROCEDURE — 83036 HEMOGLOBIN GLYCOSYLATED A1C: CPT

## 2020-07-30 PROCEDURE — 85025 COMPLETE CBC W/AUTO DIFF WBC: CPT

## 2020-07-30 PROCEDURE — 84443 ASSAY THYROID STIM HORMONE: CPT

## 2020-07-31 LAB
ALBUMIN/CREAT UR: 9.2 UG/MG (ref 0–30)
CREAT UR-MCNC: 98 MG/DL (ref 23–375)
MICROALBUMIN UR DL<=1MG/L-MCNC: 9 UG/ML

## 2020-08-03 ENCOUNTER — OFFICE VISIT (OUTPATIENT)
Dept: FAMILY MEDICINE | Facility: CLINIC | Age: 50
End: 2020-08-03
Payer: COMMERCIAL

## 2020-08-03 ENCOUNTER — CLINICAL SUPPORT (OUTPATIENT)
Dept: FAMILY MEDICINE | Facility: CLINIC | Age: 50
End: 2020-08-03
Payer: COMMERCIAL

## 2020-08-03 VITALS
WEIGHT: 215 LBS | TEMPERATURE: 98 F | OXYGEN SATURATION: 98 % | RESPIRATION RATE: 20 BRPM | BODY MASS INDEX: 30.78 KG/M2 | HEART RATE: 82 BPM | SYSTOLIC BLOOD PRESSURE: 109 MMHG | DIASTOLIC BLOOD PRESSURE: 67 MMHG | HEIGHT: 70 IN

## 2020-08-03 DIAGNOSIS — E11.9 TYPE 2 DIABETES MELLITUS WITHOUT COMPLICATION, UNSPECIFIED WHETHER LONG TERM INSULIN USE: ICD-10-CM

## 2020-08-03 DIAGNOSIS — I10 ESSENTIAL HYPERTENSION: ICD-10-CM

## 2020-08-03 DIAGNOSIS — M65.332 TRIGGER MIDDLE FINGER OF LEFT HAND: ICD-10-CM

## 2020-08-03 DIAGNOSIS — E11.9 TYPE 2 DIABETES MELLITUS WITHOUT COMPLICATION, WITHOUT LONG-TERM CURRENT USE OF INSULIN: Primary | ICD-10-CM

## 2020-08-03 PROCEDURE — 99999 PR PBB SHADOW E&M-EST. PATIENT-LVL IV: CPT | Mod: PBBFAC,,, | Performed by: FAMILY MEDICINE

## 2020-08-03 PROCEDURE — 99214 PR OFFICE/OUTPT VISIT, EST, LEVL IV, 30-39 MIN: ICD-10-PCS | Mod: S$GLB,,, | Performed by: FAMILY MEDICINE

## 2020-08-03 PROCEDURE — 99999 PR PBB SHADOW E&M-EST. PATIENT-LVL I: ICD-10-PCS | Mod: PBBFAC,,,

## 2020-08-03 PROCEDURE — 99999 PR PBB SHADOW E&M-EST. PATIENT-LVL I: CPT | Mod: PBBFAC,,,

## 2020-08-03 PROCEDURE — 99214 OFFICE O/P EST MOD 30 MIN: CPT | Mod: S$GLB,,, | Performed by: FAMILY MEDICINE

## 2020-08-03 PROCEDURE — 99999 PR PBB SHADOW E&M-EST. PATIENT-LVL IV: ICD-10-PCS | Mod: PBBFAC,,, | Performed by: FAMILY MEDICINE

## 2020-08-03 RX ORDER — IBUPROFEN 600 MG/1
TABLET ORAL
COMMUNITY
Start: 2020-07-16 | End: 2020-11-20

## 2020-08-03 NOTE — PROGRESS NOTES
Subjective:       Patient ID: Umberto Okeefe is a 50 y.o. male.    Chief Complaint: Follow-up (BW review)    In regard to the patient's diabetes, patient reports that blood sugars have been well controlled. Patient Denies hypoglycemia. Patient is currently on insulin therapy. Patient is on ACE/ARB and is on statin. Last a1c was Hemoglobin A1C       Date                     Value               Ref Range           Status                07/30/2020               8.8 (H)             4.5 - 6.2 %         Final              Comment:    According to ADA guidelines, hemoglobin A1C <7.0% represents  optimal control in non-pregnant diabetic patients.  Different  metrics may apply to specific populations.   Standards of Medical Care in Diabetes - 2016.  For the purpose of screening for the presence of diabetes:  <5.7%     Consistent with the absence of diabetes  5.7-6.4%  Consistent with increasing risk for diabetes   (prediabetes)  >or=6.5%  Consistent with diabetes  Currently no consensus exists for use of hemoglobin A1C  for diagnosis of diabetes for children.         04/13/2020               8.1 (H)             4.5 - 6.2 %         Final              Comment:    According to ADA guidelines, hemoglobin A1C <7.0% represents  optimal control in non-pregnant diabetic patients.  Different  metrics may apply to specific populations.   Standards of Medical Care in Diabetes - 2016.  For the purpose of screening for the presence of diabetes:  <5.7%     Consistent with the absence of diabetes  5.7-6.4%  Consistent with increasing risk for diabetes   (prediabetes)  >or=6.5%  Consistent with diabetes  Currently no consensus exists for use of hemoglobin A1C  for diagnosis of diabetes for children.         01/13/2020               7.9 (H)             4.5 - 6.2 %         Final              Comment:    According to ADA guidelines, hemoglobin A1C <7.0% represents  optimal control in non-pregnant diabetic patients.  Different  metrics may  apply to specific populations.   Standards of Medical Care in Diabetes - 2016.  For the purpose of screening for the presence of diabetes:  <5.7%     Consistent with the absence of diabetes  5.7-6.4%  Consistent with increasing risk for diabetes   (prediabetes)  >or=6.5%  Consistent with diabetes  Currently no consensus exists for use of hemoglobin A1C  for diagnosis of diabetes for children.    ----------    In regards to the patient's hypertension, patient denies chest pain/sob, and reports compliance with medication regimen.    In regards to the patient's dyslipidemia, patient reports compliance with medication regimen without side effects.    Review of Systems   Constitutional: Negative for activity change, appetite change, fatigue and fever.   HENT: Negative for congestion, dental problem, ear discharge, hearing loss, postnasal drip, sinus pain, sneezing and trouble swallowing.    Eyes: Negative for photophobia and discharge.   Respiratory: Negative for apnea, cough and shortness of breath.    Cardiovascular: Negative for chest pain.   Gastrointestinal: Negative for abdominal distention, abdominal pain, blood in stool, diarrhea, nausea and vomiting.   Endocrine: Negative for cold intolerance.   Genitourinary: Negative for difficulty urinating, flank pain, frequency, hematuria and testicular pain.   Musculoskeletal: Negative for arthralgias and myalgias.   Skin: Negative for color change.   Allergic/Immunologic: Negative for environmental allergies, food allergies and immunocompromised state.   Neurological: Negative for dizziness, syncope, numbness and headaches.   Hematological: Negative for adenopathy. Does not bruise/bleed easily.   Psychiatric/Behavioral: Negative for agitation, confusion, decreased concentration, hallucinations, self-injury and sleep disturbance.   All other systems reviewed and are negative.        Reviewed family, medical, surgical, and social history.    Objective:      /67 (BP  "Location: Left arm, Patient Position: Sitting, BP Method: Medium (Automatic))   Pulse 82   Temp 97.8 °F (36.6 °C) (Oral)   Resp 20   Ht 5' 10" (1.778 m)   Wt 97.5 kg (215 lb)   SpO2 98%   BMI 30.85 kg/m²   Physical Exam  Vitals signs and nursing note reviewed.   Constitutional:       General: He is not in acute distress.     Appearance: He is well-developed. He is not diaphoretic.   HENT:      Head: Normocephalic and atraumatic.      Nose: Nose normal.      Mouth/Throat:      Pharynx: No oropharyngeal exudate.   Eyes:      Conjunctiva/sclera: Conjunctivae normal.      Pupils: Pupils are equal, round, and reactive to light.   Neck:      Musculoskeletal: Normal range of motion.      Thyroid: No thyromegaly.   Cardiovascular:      Rate and Rhythm: Normal rate and regular rhythm.      Heart sounds: Normal heart sounds. No murmur. No friction rub. No gallop.    Pulmonary:      Effort: Pulmonary effort is normal. No respiratory distress.      Breath sounds: Normal breath sounds. No wheezing or rales.   Abdominal:      General: There is no distension.      Palpations: Abdomen is soft.      Tenderness: There is no abdominal tenderness. There is no guarding.   Musculoskeletal: Normal range of motion.         General: No tenderness or deformity.   Skin:     General: Skin is warm and dry.      Coloration: Skin is not pale.      Findings: No erythema or rash.   Neurological:      Mental Status: He is alert and oriented to person, place, and time.      Cranial Nerves: No cranial nerve deficit.      Sensory: No sensory deficit.      Motor: No abnormal muscle tone.      Coordination: Coordination normal.      Deep Tendon Reflexes: Reflexes normal.   Psychiatric:         Behavior: Behavior normal.         Thought Content: Thought content normal.         Judgment: Judgment normal.         Assessment:       1. Type 2 diabetes mellitus without complication, without long-term current use of insulin    2. Essential hypertension  "   3. Trigger middle finger of left hand        Plan:       Type 2 diabetes mellitus without complication, without long-term current use of insulin  -     Lipid Panel; Future; Expected date: 11/03/2020  -     CBC auto differential; Future; Expected date: 11/03/2020  -     Comprehensive metabolic panel; Future; Expected date: 11/03/2020  -     TSH; Future; Expected date: 11/03/2020  -     Microalbumin/creatinine urine ratio; Future; Expected date: 11/03/2020  -     Hemoglobin A1C; Future; Expected date: 11/03/2020    Essential hypertension    Trigger middle finger of left hand  -     Ambulatory referral/consult to Orthopedics; Future; Expected date: 08/10/2020            Risks, benefits, and side effects were discussed with the patient. All questions were answered to the fullest satisfaction of the patient, and pt verbalized understanding and agreement to treatment plan. Pt was to call with any new or worsening symptoms, or present to the ER.

## 2020-08-05 NOTE — PATIENT INSTRUCTIONS
Umberto Okeefe is a 50 y.o. male here for a diabetic eye screening with non-dilated fundus photos per Dr. Maicol Alba    Patient cooperative?: yes  Small pupils?: yes  Last eye exam: approx 6months ago    For exam results, see Encounter Report.

## 2020-08-24 RX ORDER — PEN NEEDLE, DIABETIC 30 GX3/16"
NEEDLE, DISPOSABLE MISCELLANEOUS
Qty: 100 EACH | Refills: 11 | Status: SHIPPED | OUTPATIENT
Start: 2020-08-24 | End: 2020-12-06

## 2020-08-28 ENCOUNTER — PATIENT OUTREACH (OUTPATIENT)
Dept: ADMINISTRATIVE | Facility: OTHER | Age: 50
End: 2020-08-28

## 2020-08-28 NOTE — PROGRESS NOTES
Health Maintenance Due   Topic Date Due    Hepatitis C Screening  1970    HIV Screening  03/13/1986    Pneumococcal Vaccine (Medium Risk) (1 of 1 - PPSV23) 03/13/1990     Updates were requested from care everywhere.  Chart was reviewed for overdue Proactive [facility] Encounters ([facility]) topics (CRS, Breast Cancer Screening, Eye exam)  Health Maintenance has been updated.  LINKS immunization registry triggered.  Immunizations were not able to be reconciled. Patient not found in LINKS.

## 2020-09-02 DIAGNOSIS — M79.642 LEFT HAND PAIN: Primary | ICD-10-CM

## 2020-09-03 ENCOUNTER — HOSPITAL ENCOUNTER (OUTPATIENT)
Dept: RADIOLOGY | Facility: HOSPITAL | Age: 50
Discharge: HOME | End: 2020-09-03
Attending: ORTHOPAEDIC SURGERY
Payer: COMMERCIAL

## 2020-09-03 ENCOUNTER — OFFICE VISIT (OUTPATIENT)
Dept: ORTHOPEDICS | Facility: CLINIC | Age: 50
End: 2020-09-03
Payer: COMMERCIAL

## 2020-09-03 VITALS
OXYGEN SATURATION: 98 % | HEART RATE: 75 BPM | TEMPERATURE: 98 F | RESPIRATION RATE: 14 BRPM | BODY MASS INDEX: 30.06 KG/M2 | HEIGHT: 70 IN | WEIGHT: 210 LBS

## 2020-09-03 DIAGNOSIS — M18.12 ARTHRITIS OF CARPOMETACARPAL (CMC) JOINT OF LEFT THUMB: ICD-10-CM

## 2020-09-03 DIAGNOSIS — M79.642 LEFT HAND PAIN: ICD-10-CM

## 2020-09-03 DIAGNOSIS — M65.332 TRIGGER MIDDLE FINGER OF LEFT HAND: Primary | ICD-10-CM

## 2020-09-03 PROCEDURE — 73130 XR HAND COMPLETE 3 VIEW LEFT: ICD-10-PCS | Mod: 26,LT,, | Performed by: RADIOLOGY

## 2020-09-03 PROCEDURE — 20600 SMALL JOINT ASPIRATION/INJECTION: L THUMB MCP: ICD-10-PCS | Mod: 51,FA,S$GLB, | Performed by: ORTHOPAEDIC SURGERY

## 2020-09-03 PROCEDURE — 73130 X-RAY EXAM OF HAND: CPT | Mod: 26,LT,, | Performed by: RADIOLOGY

## 2020-09-03 PROCEDURE — 99999 PR PBB SHADOW E&M-EST. PATIENT-LVL IV: ICD-10-PCS | Mod: PBBFAC,,, | Performed by: ORTHOPAEDIC SURGERY

## 2020-09-03 PROCEDURE — 20600 DRAIN/INJ JOINT/BURSA W/O US: CPT | Mod: 51,FA,S$GLB, | Performed by: ORTHOPAEDIC SURGERY

## 2020-09-03 PROCEDURE — 73130 X-RAY EXAM OF HAND: CPT | Mod: TC,PN,LT

## 2020-09-03 PROCEDURE — 99999 PR PBB SHADOW E&M-EST. PATIENT-LVL IV: CPT | Mod: PBBFAC,,, | Performed by: ORTHOPAEDIC SURGERY

## 2020-09-03 PROCEDURE — 99204 PR OFFICE/OUTPT VISIT, NEW, LEVL IV, 45-59 MIN: ICD-10-PCS | Mod: 25,S$GLB,, | Performed by: ORTHOPAEDIC SURGERY

## 2020-09-03 PROCEDURE — 20550 TENDON SHEATH: L LONG MCP: ICD-10-PCS | Mod: F2,S$GLB,, | Performed by: ORTHOPAEDIC SURGERY

## 2020-09-03 PROCEDURE — 99204 OFFICE O/P NEW MOD 45 MIN: CPT | Mod: 25,S$GLB,, | Performed by: ORTHOPAEDIC SURGERY

## 2020-09-03 PROCEDURE — 20550 NJX 1 TENDON SHEATH/LIGAMENT: CPT | Mod: F2,S$GLB,, | Performed by: ORTHOPAEDIC SURGERY

## 2020-09-03 NOTE — PROCEDURES
Small Joint Aspiration/Injection: L thumb MCP    Date/Time: 9/3/2020 8:00 AM  Performed by: Andrew Lawson DO  Authorized by: Andrew Lawson, DO     Consent Done?:  Yes (Verbal)  Indications:  Arthritis, diagnostic evaluation, pain and joint swelling  Site marked: the procedure site was marked    Timeout: prior to procedure the correct patient, procedure, and site was verified    Prep: patient was prepped and draped in usual sterile fashion      Location:  Thumb  Site:  L thumb MCP  Ultrasonic guidance for needle placement?: No    Needle size:  25 G  Approach:  Radial  Medications:  10 mg triamcinolone acetonide 10 mg/mL  Patient tolerance:  Patient tolerated the procedure well with no immediate complications

## 2020-09-03 NOTE — PROGRESS NOTES
"  Subjective:      Patient ID: Umberto Okeefe is a 50 y.o. male.    Chief Complaint: Pain of the Left Hand    Referring Provider: Maicol Alba Md  [address]  [place] [address]    HPI:    Mr. Okeefe is a 50-year-old right-hand-dominant male who presented today for evaluation of six months of triggering of his left middle finger which has been progressively worsening. He stated, "I awoke one morning with it.  When a wake up in the morning I have to unlock it.  It locks up on my steering wheel intervals."  He has not taken NSAIDs, done physical/ occupational therapy, worn splints, nor had injections.  He also has complaints of left basilar thumb pain which gives him issues when he tries to  his tools.    Past Medical History:   Diagnosis Date    Diabetes mellitus, type 2     Hypertension     Neuropathy      *  *  * Restless leg syndrome  Hyperlipidemia  Seasonal allergies   GERD      Past Surgical History:   Procedure Laterality Date    KNEE SURGERY right knee arthroscopy         Review of patient's allergies indicates:   Allergen Reactions    Topamax [topiramate]      Mental agitation        Social History     Occupational History     technician   Tobacco Use    Smoking status: Former Smoker     Packs/day: 1.00     Years: 15.00     Pack years: 15.00     Types: Cigarettes     Quit date: 5/19/2010     Years since quitting: 10.3    Smokeless tobacco: Never Used   Substance and Sexual Activity    Alcohol use: No     Frequency: Never     Drinks per session: Patient refused     Binge frequency: Never    Drug use: No    Sexual activity: Yes     Partners: Female      Family History   Problem Relation Age of Onset    Kidney disease Mother     Kidney disease Maternal Uncle        Previous Hospitalizations:  Denied previous hospitalizations.     ROS:   Review of Systems   Constitution: Negative for chills and fever.   HENT: Negative for congestion.    Eyes: Negative for blurred " vision.   Respiratory: Negative for cough.    Endocrine: Negative for polydipsia.   Hematologic/Lymphatic: Negative for adenopathy.   Skin: Negative for flushing, itching and rash.   Musculoskeletal: Positive for joint pain and joint swelling. Negative for gout.   Gastrointestinal: Positive for heartburn. Negative for constipation and diarrhea.   Genitourinary: Negative for nocturia.   Neurological: Negative for headaches and seizures.   Psychiatric/Behavioral: Negative for depression and substance abuse. The patient is not nervous/anxious.    Allergic/Immunologic: Positive for environmental allergies.           Objective:      Physical Exam:   General: AAOx3.  No acute distress  HEENT: Normocephalic, PEARLA EOMI, Good Dentition  Neck: Supple, No JVD  Chest: Symetric, equal excursion on inspiration  Abdomen: Soft NTND  Vascular:  Pulses intact and equal bilaterally.  Capillary refill less than 3 seconds and equal bilaterally  Neurologic:  Pinprick and soft touch intact and equal bilaterally  Integment:    Subungual ecchymosis left thumb.  Extremity:   Hand:  Pronation / supination equal bilaterally 85/85 degrees.  Dorsiflexion / volar flexion equal bilaterally 70/70 degrees.  Nontender in the anatomic snuffbox bilaterally.  Nontender at the 1st dorsal compartment bilaterally.  Finkelstein's negative bilaterally.  Grind test positive left thumb.  Tender at the 1st CMC joint left thumb.  Mild swelling at the 1st CMC joint left thumb.  Nontender at the scapholunate interval bilaterally.  Clark's test negative bilaterally.  Nontender at this DRUJ/TFCC bilaterally.  Ulna impaction test negative bilaterally.  Volar nodule palpable left middle finger.  Triggering left middle finger.  Tender with palpation volar nodule left  middle finger. Tender with motion left middle finger.  Radiography:  Personally reviewed   X-rays of the left hand completed on 09/03/2020 showed 1st CMC arthritis with mild radiocarpal arthritis       Assessment:       Impression:      1. Trigger middle finger of left hand    2. Arthritis of carpometacarpal (CMC) joint of left thumb    3. Left hand pain          Plan:       1.  Discussed physical examination and radiographic findings with the patient. Umberto understands that he has trigger finger of his left middle finger and 1st CMC arthritis of his left thumb.  Treatment alternatives and outcomes were discussed with the patient he understands he can be treated conservatively with NSAIDs, activity modification, splinting / taping, physical/occupational therapy, injections, or he could consider surgical intervention such as an A1 pulley release.  Since he has not completed conservative management recommend he trial conservative care and then if he fails conservative care then consider surgical intervention.    2. Offered a steroid injection to the flexor tendon sheath of the left middle finger, he elected to proceed.  3. Offered a steroid injection to the 1st CMC joint of the left thumb, he elected to proceed.  4. IP taping was shown discussed with the patient in supplies were given.  5. Offered referred to physical/ occupational therapy declined.  6. Vitamin-E massage and home exercises were shown discussed with the patient.  7. Take NSAIDs as tolerated allowed by PCM.  8. Follow up p.r.n..

## 2020-09-03 NOTE — PROCEDURES
Tendon Sheath: L long MCP    Date/Time: 9/3/2020 8:00 AM  Performed by: Andrew Lawson DO  Authorized by: Andrew Lawson, DO     Consent Done?:  Yes (Verbal)  Indications:  Pain and diagnostic evaluation  Site marked: the procedure site was marked    Timeout: prior to procedure the correct patient, procedure, and site was verified    Prep: patient was prepped and draped in usual sterile fashion      Location:  Long finger  Site:  L long MCP  Ultrasonic guidance for needle placement?: No    Needle size:  25 G  Approach:  Volar  Medications:  10 mg triamcinolone acetonide 10 mg/mL  Patient tolerance:  Patient tolerated the procedure well with no immediate complications

## 2020-09-03 NOTE — LETTER
September 3, 2020      Maicol Alba MD  [address]  [place] [address]           [facility]  [address], [address]  [place] [address]  Phone: [phone]  Fax: [phone]          Patient: Umberto Okeefe   MR Number: [id number]   YOB: 1970   Date of Visit: 9/3/2020       Dear Dr. Maicol Alba:    Thank you for referring Umberto Okeefe to me for evaluation. Attached you will find relevant portions of my assessment and plan of care.    If you have questions, please do not hesitate to call me. I look forward to following Umberto Okeefe along with you.    Sincerely,    Andrew Lawson, DO    Enclosure  CC:  No Recipients    If you would like to receive this communication electronically, please contact [email or web address] or [phone] to request more information on [employer] Link access.    For providers and/or their staff who would like to refer a patient to [facility], please contact us through our one-stop-shop provider referral line, [facility] , at [phone].    If you feel you have received this communication in error or would no longer like to receive these types of communications, please e-mail [email or web address]

## 2020-09-09 ENCOUNTER — PATIENT MESSAGE (OUTPATIENT)
Dept: FAMILY MEDICINE | Facility: CLINIC | Age: 50
End: 2020-09-09

## 2020-09-09 DIAGNOSIS — E11.9 TYPE 2 DIABETES MELLITUS WITHOUT COMPLICATION, UNSPECIFIED WHETHER LONG TERM INSULIN USE: Primary | ICD-10-CM

## 2020-09-10 RX ORDER — CALCIUM CITRATE/VITAMIN D3 200MG-6.25
TABLET ORAL
Qty: 100 STRIP | Refills: 11 | Status: SHIPPED | OUTPATIENT
Start: 2020-09-10 | End: 2021-05-16 | Stop reason: SDUPTHER

## 2020-10-05 RX ORDER — GABAPENTIN 800 MG/1
800 TABLET ORAL 3 TIMES DAILY
Qty: 90 TABLET | Refills: 3 | Status: SHIPPED | OUTPATIENT
Start: 2020-10-05 | End: 2021-01-30 | Stop reason: SDUPTHER

## 2020-10-05 RX ORDER — ATORVASTATIN CALCIUM 40 MG/1
40 TABLET, FILM COATED ORAL DAILY
Qty: 90 TABLET | Refills: 3 | Status: SHIPPED | OUTPATIENT
Start: 2020-10-05 | End: 2022-01-04

## 2020-10-05 NOTE — TELEPHONE ENCOUNTER
Patient is requesting refill on neurontin 800mg TID. Patient last seen 07/14/2020, medication last filled 04/23/2020 with three refills. [place] [facility] [place]. Please advise

## 2020-10-22 RX ORDER — DICLOFENAC SODIUM 10 MG/G
2 GEL TOPICAL 3 TIMES DAILY
Qty: 100 G | Refills: 4 | Status: SHIPPED | OUTPATIENT
Start: 2020-10-22 | End: 2022-09-12

## 2020-10-22 NOTE — TELEPHONE ENCOUNTER
Refill request:  Voltaren 1% gel    Last office visit on 7/14/2020    Requested pharmacy:  [facility] [place]

## 2020-11-04 ENCOUNTER — LAB VISIT (OUTPATIENT)
Dept: LAB | Facility: HOSPITAL | Age: 50
End: 2020-11-04
Attending: FAMILY MEDICINE
Payer: COMMERCIAL

## 2020-11-04 DIAGNOSIS — E11.9 TYPE 2 DIABETES MELLITUS WITHOUT COMPLICATION, WITHOUT LONG-TERM CURRENT USE OF INSULIN: ICD-10-CM

## 2020-11-04 LAB
ALBUMIN SERPL BCP-MCNC: 4.4 G/DL (ref 3.5–5.2)
ALP SERPL-CCNC: 75 U/L (ref 55–135)
ALT SERPL W/O P-5'-P-CCNC: 29 U/L (ref 10–44)
ANION GAP SERPL CALC-SCNC: 8 MMOL/L (ref 8–16)
AST SERPL-CCNC: 21 U/L (ref 10–40)
BASOPHILS # BLD AUTO: 0.05 K/UL (ref 0–0.2)
BASOPHILS NFR BLD: 0.9 % (ref 0–1.9)
BILIRUB SERPL-MCNC: 0.5 MG/DL (ref 0.1–1)
BUN SERPL-MCNC: 16 MG/DL (ref 6–20)
CALCIUM SERPL-MCNC: 9 MG/DL (ref 8.7–10.5)
CHLORIDE SERPL-SCNC: 101 MMOL/L (ref 95–110)
CHOLEST SERPL-MCNC: 149 MG/DL (ref 120–199)
CHOLEST/HDLC SERPL: 4.7 {RATIO} (ref 2–5)
CO2 SERPL-SCNC: 26 MMOL/L (ref 23–29)
CREAT SERPL-MCNC: 1 MG/DL (ref 0.5–1.4)
DIFFERENTIAL METHOD: NORMAL
EOSINOPHIL # BLD AUTO: 0.1 K/UL (ref 0–0.5)
EOSINOPHIL NFR BLD: 2.6 % (ref 0–8)
ERYTHROCYTE [DISTWIDTH] IN BLOOD BY AUTOMATED COUNT: 11.9 % (ref 11.5–14.5)
EST. GFR  (AFRICAN AMERICAN): >60 ML/MIN/1.73 M^2
EST. GFR  (NON AFRICAN AMERICAN): >60 ML/MIN/1.73 M^2
ESTIMATED AVG GLUCOSE: 194 MG/DL (ref 68–131)
GLUCOSE SERPL-MCNC: 204 MG/DL (ref 70–110)
HBA1C MFR BLD HPLC: 8.4 % (ref 4.5–6.2)
HCT VFR BLD AUTO: 45 % (ref 40–54)
HDLC SERPL-MCNC: 32 MG/DL (ref 40–75)
HDLC SERPL: 21.5 % (ref 20–50)
HGB BLD-MCNC: 15.3 G/DL (ref 14–18)
IMM GRANULOCYTES # BLD AUTO: 0.01 K/UL (ref 0–0.04)
IMM GRANULOCYTES NFR BLD AUTO: 0.2 % (ref 0–0.5)
LDLC SERPL CALC-MCNC: 94.8 MG/DL (ref 63–159)
LYMPHOCYTES # BLD AUTO: 1.4 K/UL (ref 1–4.8)
LYMPHOCYTES NFR BLD: 25.2 % (ref 18–48)
MCH RBC QN AUTO: 29.7 PG (ref 27–31)
MCHC RBC AUTO-ENTMCNC: 34 G/DL (ref 32–36)
MCV RBC AUTO: 87 FL (ref 82–98)
MONOCYTES # BLD AUTO: 0.7 K/UL (ref 0.3–1)
MONOCYTES NFR BLD: 12.1 % (ref 4–15)
NEUTROPHILS # BLD AUTO: 3.2 K/UL (ref 1.8–7.7)
NEUTROPHILS NFR BLD: 59 % (ref 38–73)
NONHDLC SERPL-MCNC: 117 MG/DL
NRBC BLD-RTO: 0 /100 WBC
PLATELET # BLD AUTO: 332 K/UL (ref 150–350)
PMV BLD AUTO: 9.5 FL (ref 9.2–12.9)
POTASSIUM SERPL-SCNC: 4.3 MMOL/L (ref 3.5–5.1)
PROT SERPL-MCNC: 7.9 G/DL (ref 6–8.4)
RBC # BLD AUTO: 5.16 M/UL (ref 4.6–6.2)
SODIUM SERPL-SCNC: 135 MMOL/L (ref 136–145)
TRIGL SERPL-MCNC: 111 MG/DL (ref 30–150)
TSH SERPL DL<=0.005 MIU/L-ACNC: 1.05 UIU/ML (ref 0.34–5.6)
WBC # BLD AUTO: 5.35 K/UL (ref 3.9–12.7)

## 2020-11-04 PROCEDURE — 84443 ASSAY THYROID STIM HORMONE: CPT

## 2020-11-04 PROCEDURE — 36415 COLL VENOUS BLD VENIPUNCTURE: CPT

## 2020-11-04 PROCEDURE — 80061 LIPID PANEL: CPT

## 2020-11-04 PROCEDURE — 85025 COMPLETE CBC W/AUTO DIFF WBC: CPT

## 2020-11-04 PROCEDURE — 80053 COMPREHEN METABOLIC PANEL: CPT

## 2020-11-04 PROCEDURE — 83036 HEMOGLOBIN GLYCOSYLATED A1C: CPT

## 2020-11-20 ENCOUNTER — OFFICE VISIT (OUTPATIENT)
Dept: FAMILY MEDICINE | Facility: CLINIC | Age: 50
End: 2020-11-20
Payer: COMMERCIAL

## 2020-11-20 VITALS
HEART RATE: 76 BPM | BODY MASS INDEX: 32.14 KG/M2 | TEMPERATURE: 98 F | WEIGHT: 224.5 LBS | SYSTOLIC BLOOD PRESSURE: 119 MMHG | DIASTOLIC BLOOD PRESSURE: 66 MMHG | HEIGHT: 70 IN | OXYGEN SATURATION: 98 % | RESPIRATION RATE: 16 BRPM

## 2020-11-20 DIAGNOSIS — E11.9 TYPE 2 DIABETES MELLITUS WITHOUT COMPLICATION, WITHOUT LONG-TERM CURRENT USE OF INSULIN: Primary | ICD-10-CM

## 2020-11-20 DIAGNOSIS — Z23 NEED FOR VACCINATION: ICD-10-CM

## 2020-11-20 DIAGNOSIS — N52.8 OTHER MALE ERECTILE DYSFUNCTION: ICD-10-CM

## 2020-11-20 PROCEDURE — 90686 FLU VACCINE (QUAD) GREATER THAN OR EQUAL TO 3YO PRESERVATIVE FREE IM: ICD-10-PCS | Mod: S$GLB,,, | Performed by: FAMILY MEDICINE

## 2020-11-20 PROCEDURE — 99213 PR OFFICE/OUTPT VISIT, EST, LEVL III, 20-29 MIN: ICD-10-PCS | Mod: 25,S$GLB,, | Performed by: FAMILY MEDICINE

## 2020-11-20 PROCEDURE — 90686 IIV4 VACC NO PRSV 0.5 ML IM: CPT | Mod: S$GLB,,, | Performed by: FAMILY MEDICINE

## 2020-11-20 PROCEDURE — 90471 FLU VACCINE (QUAD) GREATER THAN OR EQUAL TO 3YO PRESERVATIVE FREE IM: ICD-10-PCS | Mod: S$GLB,,, | Performed by: FAMILY MEDICINE

## 2020-11-20 PROCEDURE — 90471 IMMUNIZATION ADMIN: CPT | Mod: S$GLB,,, | Performed by: FAMILY MEDICINE

## 2020-11-20 PROCEDURE — 99999 PR PBB SHADOW E&M-EST. PATIENT-LVL IV: CPT | Mod: PBBFAC,,, | Performed by: FAMILY MEDICINE

## 2020-11-20 PROCEDURE — 99999 PR PBB SHADOW E&M-EST. PATIENT-LVL IV: ICD-10-PCS | Mod: PBBFAC,,, | Performed by: FAMILY MEDICINE

## 2020-11-20 PROCEDURE — 99213 OFFICE O/P EST LOW 20 MIN: CPT | Mod: 25,S$GLB,, | Performed by: FAMILY MEDICINE

## 2020-11-20 RX ORDER — TADALAFIL 10 MG/1
10 TABLET ORAL DAILY PRN
Qty: 30 TABLET | Refills: 0 | Status: SHIPPED | OUTPATIENT
Start: 2020-11-20 | End: 2024-03-14

## 2020-11-20 NOTE — PROGRESS NOTES
Subjective:       Patient ID: Umberto Okeefe is a 50 y.o. male.    Chief Complaint: Follow-up (BW review)    In regard to the patient's diabetes, patient reports that blood sugars have been well controlled. Patient Denies hypoglycemia. Patient is currently on insulin therapy. Patient is on ACE/ARB and is on statin. Last a1c was Hemoglobin A1C       Date                     Value               Ref Range           Status                11/04/2020               8.4 (H)             4.5 - 6.2 %         Final              Comment:    According to ADA guidelines, hemoglobin A1C <7.0% represents  optimal control in non-pregnant diabetic patients.  Different  metrics may apply to specific populations.   Standards of Medical Care in Diabetes - 2016.  For the purpose of screening for the presence of diabetes:  <5.7%     Consistent with the absence of diabetes  5.7-6.4%  Consistent with increasing risk for diabetes   (prediabetes)  >or=6.5%  Consistent with diabetes  Currently no consensus exists for use of hemoglobin A1C  for diagnosis of diabetes for children.         07/30/2020               8.8 (H)             4.5 - 6.2 %         Final              Comment:    According to ADA guidelines, hemoglobin A1C <7.0% represents  optimal control in non-pregnant diabetic patients.  Different  metrics may apply to specific populations.   Standards of Medical Care in Diabetes - 2016.  For the purpose of screening for the presence of diabetes:  <5.7%     Consistent with the absence of diabetes  5.7-6.4%  Consistent with increasing risk for diabetes   (prediabetes)  >or=6.5%  Consistent with diabetes  Currently no consensus exists for use of hemoglobin A1C  for diagnosis of diabetes for children.         04/13/2020               8.1 (H)             4.5 - 6.2 %         Final              Comment:    According to ADA guidelines, hemoglobin A1C <7.0% represents  optimal control in non-pregnant diabetic patients.  Different  metrics may  "apply to specific populations.   Standards of Medical Care in Diabetes - 2016.  For the purpose of screening for the presence of diabetes:  <5.7%     Consistent with the absence of diabetes  5.7-6.4%  Consistent with increasing risk for diabetes   (prediabetes)  >or=6.5%  Consistent with diabetes  Currently no consensus exists for use of hemoglobin A1C  for diagnosis of diabetes for children.    ----------          Review of Systems   Constitutional: Negative for activity change, appetite change, fatigue and fever.   HENT: Negative for congestion, dental problem, ear discharge, hearing loss, postnasal drip, sinus pain, sneezing and trouble swallowing.    Eyes: Negative for photophobia and discharge.   Respiratory: Negative for apnea, cough and shortness of breath.    Cardiovascular: Negative for chest pain.   Gastrointestinal: Negative for abdominal distention, abdominal pain, blood in stool, diarrhea, nausea and vomiting.   Endocrine: Negative for cold intolerance.   Genitourinary: Negative for difficulty urinating, flank pain, frequency, hematuria and testicular pain.   Musculoskeletal: Negative for arthralgias and myalgias.   Skin: Negative for color change.   Allergic/Immunologic: Negative for environmental allergies, food allergies and immunocompromised state.   Neurological: Negative for dizziness, syncope, numbness and headaches.   Hematological: Negative for adenopathy. Does not bruise/bleed easily.   Psychiatric/Behavioral: Negative for agitation, confusion, decreased concentration, hallucinations, self-injury and sleep disturbance.   All other systems reviewed and are negative.        Reviewed family, medical, surgical, and social history.    Objective:      /66 (BP Location: Left arm, Patient Position: Sitting, BP Method: Medium (Automatic))   Pulse 76   Temp 97.9 °F (36.6 °C) (Oral)   Resp 16   Ht 5' 10" (1.778 m)   Wt 101.8 kg (224 lb 8 oz)   SpO2 98%   BMI 32.21 kg/m²   Physical " Exam  Vitals signs and nursing note reviewed.   Constitutional:       General: He is not in acute distress.     Appearance: He is well-developed. He is not diaphoretic.   HENT:      Head: Normocephalic and atraumatic.      Nose: Nose normal.      Mouth/Throat:      Pharynx: No oropharyngeal exudate.   Eyes:      Conjunctiva/sclera: Conjunctivae normal.      Pupils: Pupils are equal, round, and reactive to light.   Neck:      Musculoskeletal: Normal range of motion.      Thyroid: No thyromegaly.   Cardiovascular:      Rate and Rhythm: Normal rate and regular rhythm.      Heart sounds: Normal heart sounds. No murmur. No friction rub. No gallop.    Pulmonary:      Effort: Pulmonary effort is normal. No respiratory distress.      Breath sounds: Normal breath sounds. No wheezing or rales.   Abdominal:      General: There is no distension.      Palpations: Abdomen is soft.      Tenderness: There is no abdominal tenderness. There is no guarding.   Musculoskeletal: Normal range of motion.         General: No tenderness or deformity.   Skin:     General: Skin is warm and dry.      Coloration: Skin is not pale.      Findings: No erythema or rash.   Neurological:      Mental Status: He is alert and oriented to person, place, and time.      Cranial Nerves: No cranial nerve deficit.      Sensory: No sensory deficit.      Motor: No abnormal muscle tone.      Coordination: Coordination normal.      Deep Tendon Reflexes: Reflexes normal.   Psychiatric:         Behavior: Behavior normal.         Thought Content: Thought content normal.         Judgment: Judgment normal.         Assessment:       1. Type 2 diabetes mellitus without complication, without long-term current use of insulin    2. Need for vaccination    3. Other male erectile dysfunction        Plan:       Type 2 diabetes mellitus without complication, without long-term current use of insulin  -     Lipid Panel; Future; Expected date: 02/20/2021  -     CBC Auto  Differential; Future; Expected date: 02/20/2021  -     Comprehensive Metabolic Panel; Future; Expected date: 02/20/2021  -     TSH; Future; Expected date: 02/20/2021  -     Hemoglobin A1C; Future; Expected date: 02/20/2021    Need for vaccination  -     Influenza - Quadrivalent (PF)    Other male erectile dysfunction  -     tadalafiL (CIALIS) 10 MG tablet; Take 1 tablet (10 mg total) by mouth daily as needed for Erectile Dysfunction.  Dispense: 30 tablet; Refill: 0            Risks, benefits, and side effects were discussed with the patient. All questions were answered to the fullest satisfaction of the patient, and pt verbalized understanding and agreement to treatment plan. Pt was to call with any new or worsening symptoms, or present to the ER.

## 2020-12-07 RX ORDER — PEN NEEDLE, DIABETIC 30 GX3/16"
NEEDLE, DISPOSABLE MISCELLANEOUS
Qty: 100 EACH | Refills: 11 | Status: SHIPPED | OUTPATIENT
Start: 2020-12-07 | End: 2020-12-10 | Stop reason: SDUPTHER

## 2020-12-11 ENCOUNTER — PATIENT MESSAGE (OUTPATIENT)
Dept: FAMILY MEDICINE | Facility: CLINIC | Age: 50
End: 2020-12-11

## 2020-12-11 RX ORDER — PEN NEEDLE, DIABETIC 30 GX3/16"
NEEDLE, DISPOSABLE MISCELLANEOUS
Qty: 100 EACH | Refills: 11 | Status: SHIPPED | OUTPATIENT
Start: 2020-12-11 | End: 2022-01-14 | Stop reason: SDUPTHER

## 2021-01-11 ENCOUNTER — PATIENT OUTREACH (OUTPATIENT)
Dept: ADMINISTRATIVE | Facility: OTHER | Age: 51
End: 2021-01-11

## 2021-01-11 DIAGNOSIS — E11.9 TYPE 2 DIABETES MELLITUS WITHOUT COMPLICATION, WITHOUT LONG-TERM CURRENT USE OF INSULIN: ICD-10-CM

## 2021-01-12 ENCOUNTER — OFFICE VISIT (OUTPATIENT)
Dept: PODIATRY | Facility: CLINIC | Age: 51
End: 2021-01-12
Payer: COMMERCIAL

## 2021-01-12 VITALS
WEIGHT: 224 LBS | DIASTOLIC BLOOD PRESSURE: 79 MMHG | RESPIRATION RATE: 18 BRPM | HEIGHT: 70 IN | OXYGEN SATURATION: 99 % | TEMPERATURE: 98 F | BODY MASS INDEX: 32.07 KG/M2 | HEART RATE: 84 BPM | SYSTOLIC BLOOD PRESSURE: 125 MMHG

## 2021-01-12 DIAGNOSIS — E11.9 COMPREHENSIVE DIABETIC FOOT EXAMINATION, TYPE 2 DM, ENCOUNTER FOR: ICD-10-CM

## 2021-01-12 DIAGNOSIS — E11.49 TYPE II DIABETES MELLITUS WITH NEUROLOGICAL MANIFESTATIONS: Primary | ICD-10-CM

## 2021-01-12 DIAGNOSIS — G57.93 NEUROPATHIC PAIN OF BOTH FEET: ICD-10-CM

## 2021-01-12 PROCEDURE — 99214 PR OFFICE/OUTPT VISIT, EST, LEVL IV, 30-39 MIN: ICD-10-PCS | Mod: S$GLB,,, | Performed by: PODIATRIST

## 2021-01-12 PROCEDURE — 99999 PR PBB SHADOW E&M-EST. PATIENT-LVL IV: ICD-10-PCS | Mod: PBBFAC,,, | Performed by: PODIATRIST

## 2021-01-12 PROCEDURE — 99999 PR PBB SHADOW E&M-EST. PATIENT-LVL IV: CPT | Mod: PBBFAC,,, | Performed by: PODIATRIST

## 2021-01-12 PROCEDURE — 99214 OFFICE O/P EST MOD 30 MIN: CPT | Mod: S$GLB,,, | Performed by: PODIATRIST

## 2021-02-01 RX ORDER — GABAPENTIN 800 MG/1
800 TABLET ORAL 3 TIMES DAILY
Qty: 90 TABLET | Refills: 3 | Status: SHIPPED | OUTPATIENT
Start: 2021-02-01 | End: 2021-05-30 | Stop reason: SDUPTHER

## 2021-02-17 ENCOUNTER — LAB VISIT (OUTPATIENT)
Dept: LAB | Facility: HOSPITAL | Age: 51
End: 2021-02-17
Attending: FAMILY MEDICINE
Payer: COMMERCIAL

## 2021-02-17 DIAGNOSIS — E11.9 TYPE 2 DIABETES MELLITUS WITHOUT COMPLICATION, WITHOUT LONG-TERM CURRENT USE OF INSULIN: ICD-10-CM

## 2021-02-17 LAB
ALBUMIN SERPL BCP-MCNC: 4.2 G/DL (ref 3.5–5.2)
ALP SERPL-CCNC: 74 U/L (ref 55–135)
ALT SERPL W/O P-5'-P-CCNC: 30 U/L (ref 10–44)
ANION GAP SERPL CALC-SCNC: 9 MMOL/L (ref 8–16)
AST SERPL-CCNC: 21 U/L (ref 10–40)
BASOPHILS # BLD AUTO: 0.06 K/UL (ref 0–0.2)
BASOPHILS NFR BLD: 1 % (ref 0–1.9)
BILIRUB SERPL-MCNC: 0.9 MG/DL (ref 0.1–1)
BUN SERPL-MCNC: 14 MG/DL (ref 6–20)
CALCIUM SERPL-MCNC: 9.6 MG/DL (ref 8.7–10.5)
CHLORIDE SERPL-SCNC: 101 MMOL/L (ref 95–110)
CHOLEST SERPL-MCNC: 166 MG/DL (ref 120–199)
CHOLEST/HDLC SERPL: 5.9 {RATIO} (ref 2–5)
CO2 SERPL-SCNC: 27 MMOL/L (ref 23–29)
CREAT SERPL-MCNC: 1.1 MG/DL (ref 0.5–1.4)
DIFFERENTIAL METHOD: NORMAL
EOSINOPHIL # BLD AUTO: 0.2 K/UL (ref 0–0.5)
EOSINOPHIL NFR BLD: 2.9 % (ref 0–8)
ERYTHROCYTE [DISTWIDTH] IN BLOOD BY AUTOMATED COUNT: 11.9 % (ref 11.5–14.5)
EST. GFR  (AFRICAN AMERICAN): >60 ML/MIN/1.73 M^2
EST. GFR  (NON AFRICAN AMERICAN): >60 ML/MIN/1.73 M^2
ESTIMATED AVG GLUCOSE: 232 MG/DL (ref 68–131)
GLUCOSE SERPL-MCNC: 226 MG/DL (ref 70–110)
HBA1C MFR BLD: 9.7 % (ref 4.5–6.2)
HCT VFR BLD AUTO: 46.5 % (ref 40–54)
HDLC SERPL-MCNC: 28 MG/DL (ref 40–75)
HDLC SERPL: 16.9 % (ref 20–50)
HGB BLD-MCNC: 15.9 G/DL (ref 14–18)
IMM GRANULOCYTES # BLD AUTO: 0.01 K/UL (ref 0–0.04)
IMM GRANULOCYTES NFR BLD AUTO: 0.2 % (ref 0–0.5)
LDLC SERPL CALC-MCNC: 107.6 MG/DL (ref 63–159)
LYMPHOCYTES # BLD AUTO: 1.5 K/UL (ref 1–4.8)
LYMPHOCYTES NFR BLD: 25.7 % (ref 18–48)
MCH RBC QN AUTO: 29.6 PG (ref 27–31)
MCHC RBC AUTO-ENTMCNC: 34.2 G/DL (ref 32–36)
MCV RBC AUTO: 86 FL (ref 82–98)
MONOCYTES # BLD AUTO: 0.7 K/UL (ref 0.3–1)
MONOCYTES NFR BLD: 12 % (ref 4–15)
NEUTROPHILS # BLD AUTO: 3.4 K/UL (ref 1.8–7.7)
NEUTROPHILS NFR BLD: 58.2 % (ref 38–73)
NONHDLC SERPL-MCNC: 138 MG/DL
NRBC BLD-RTO: 0 /100 WBC
PLATELET # BLD AUTO: 340 K/UL (ref 150–350)
PMV BLD AUTO: 9.4 FL (ref 9.2–12.9)
POTASSIUM SERPL-SCNC: 4.1 MMOL/L (ref 3.5–5.1)
PROT SERPL-MCNC: 8 G/DL (ref 6–8.4)
RBC # BLD AUTO: 5.38 M/UL (ref 4.6–6.2)
SODIUM SERPL-SCNC: 137 MMOL/L (ref 136–145)
TRIGL SERPL-MCNC: 152 MG/DL (ref 30–150)
TSH SERPL DL<=0.005 MIU/L-ACNC: 1.7 UIU/ML (ref 0.34–5.6)
WBC # BLD AUTO: 5.84 K/UL (ref 3.9–12.7)

## 2021-02-17 PROCEDURE — 84443 ASSAY THYROID STIM HORMONE: CPT

## 2021-02-17 PROCEDURE — 80061 LIPID PANEL: CPT

## 2021-02-17 PROCEDURE — 36415 COLL VENOUS BLD VENIPUNCTURE: CPT

## 2021-02-17 PROCEDURE — 80053 COMPREHEN METABOLIC PANEL: CPT

## 2021-02-17 PROCEDURE — 83036 HEMOGLOBIN GLYCOSYLATED A1C: CPT

## 2021-02-17 PROCEDURE — 85025 COMPLETE CBC W/AUTO DIFF WBC: CPT

## 2021-02-21 ENCOUNTER — PATIENT MESSAGE (OUTPATIENT)
Dept: FAMILY MEDICINE | Facility: CLINIC | Age: 51
End: 2021-02-21

## 2021-03-04 DIAGNOSIS — Z11.59 NEED FOR HEPATITIS C SCREENING TEST: ICD-10-CM

## 2021-03-12 ENCOUNTER — TELEPHONE (OUTPATIENT)
Dept: FAMILY MEDICINE | Facility: CLINIC | Age: 51
End: 2021-03-12

## 2021-03-12 RX ORDER — MONTELUKAST SODIUM 10 MG/1
10 TABLET ORAL NIGHTLY
Qty: 30 TABLET | Refills: 0 | Status: SHIPPED | OUTPATIENT
Start: 2021-03-12 | End: 2021-04-11

## 2021-03-24 ENCOUNTER — HOSPITAL ENCOUNTER (OUTPATIENT)
Dept: RADIOLOGY | Facility: HOSPITAL | Age: 51
Discharge: HOME | End: 2021-03-24
Attending: FAMILY MEDICINE
Payer: COMMERCIAL

## 2021-03-24 ENCOUNTER — OFFICE VISIT (OUTPATIENT)
Dept: FAMILY MEDICINE | Facility: CLINIC | Age: 51
End: 2021-03-24
Payer: COMMERCIAL

## 2021-03-24 VITALS
HEIGHT: 71 IN | BODY MASS INDEX: 31.78 KG/M2 | DIASTOLIC BLOOD PRESSURE: 76 MMHG | HEART RATE: 84 BPM | WEIGHT: 227 LBS | TEMPERATURE: 98 F | SYSTOLIC BLOOD PRESSURE: 138 MMHG | OXYGEN SATURATION: 97 %

## 2021-03-24 DIAGNOSIS — M79.641 PAIN OF RIGHT HAND: Primary | ICD-10-CM

## 2021-03-24 DIAGNOSIS — M79.641 PAIN OF RIGHT HAND: ICD-10-CM

## 2021-03-24 DIAGNOSIS — E11.9 TYPE 2 DIABETES MELLITUS WITHOUT COMPLICATION, WITHOUT LONG-TERM CURRENT USE OF INSULIN: ICD-10-CM

## 2021-03-24 PROCEDURE — 99214 OFFICE O/P EST MOD 30 MIN: CPT | Mod: S$GLB,,, | Performed by: FAMILY MEDICINE

## 2021-03-24 PROCEDURE — 73130 X-RAY EXAM OF HAND: CPT | Mod: 26,RT,, | Performed by: RADIOLOGY

## 2021-03-24 PROCEDURE — 73130 XR HAND COMPLETE 3 VIEW RIGHT: ICD-10-PCS | Mod: 26,RT,, | Performed by: RADIOLOGY

## 2021-03-24 PROCEDURE — 99214 PR OFFICE/OUTPT VISIT, EST, LEVL IV, 30-39 MIN: ICD-10-PCS | Mod: S$GLB,,, | Performed by: FAMILY MEDICINE

## 2021-03-24 PROCEDURE — 99999 PR PBB SHADOW E&M-EST. PATIENT-LVL IV: CPT | Mod: PBBFAC,,, | Performed by: FAMILY MEDICINE

## 2021-03-24 PROCEDURE — 99999 PR PBB SHADOW E&M-EST. PATIENT-LVL IV: ICD-10-PCS | Mod: PBBFAC,,, | Performed by: FAMILY MEDICINE

## 2021-03-24 PROCEDURE — 73130 X-RAY EXAM OF HAND: CPT | Mod: TC,PN,RT

## 2021-03-24 RX ORDER — DULAGLUTIDE 1.5 MG/.5ML
1.5 INJECTION, SOLUTION SUBCUTANEOUS
Qty: 2 ML | Refills: 0 | Status: SHIPPED | OUTPATIENT
Start: 2021-03-24 | End: 2021-05-16 | Stop reason: SDUPTHER

## 2021-03-30 ENCOUNTER — PATIENT OUTREACH (OUTPATIENT)
Dept: ADMINISTRATIVE | Facility: HOSPITAL | Age: 51
End: 2021-03-30

## 2021-04-01 ENCOUNTER — PATIENT MESSAGE (OUTPATIENT)
Dept: FAMILY MEDICINE | Facility: CLINIC | Age: 51
End: 2021-04-01

## 2021-04-01 ENCOUNTER — TELEPHONE (OUTPATIENT)
Dept: FAMILY MEDICINE | Facility: CLINIC | Age: 51
End: 2021-04-01

## 2021-04-19 ENCOUNTER — PATIENT OUTREACH (OUTPATIENT)
Dept: ADMINISTRATIVE | Facility: OTHER | Age: 51
End: 2021-04-19

## 2021-04-19 DIAGNOSIS — Z12.11 ENCOUNTER FOR FIT (FECAL IMMUNOCHEMICAL TEST) SCREENING: Primary | ICD-10-CM

## 2021-04-20 ENCOUNTER — OFFICE VISIT (OUTPATIENT)
Dept: ORTHOPEDICS | Facility: CLINIC | Age: 51
End: 2021-04-20
Payer: COMMERCIAL

## 2021-04-20 ENCOUNTER — PATIENT MESSAGE (OUTPATIENT)
Dept: FAMILY MEDICINE | Facility: CLINIC | Age: 51
End: 2021-04-20

## 2021-04-20 ENCOUNTER — TELEPHONE (OUTPATIENT)
Dept: FAMILY MEDICINE | Facility: CLINIC | Age: 51
End: 2021-04-20

## 2021-04-20 VITALS
OXYGEN SATURATION: 98 % | RESPIRATION RATE: 19 BRPM | WEIGHT: 227 LBS | HEART RATE: 77 BPM | BODY MASS INDEX: 31.78 KG/M2 | HEIGHT: 71 IN

## 2021-04-20 DIAGNOSIS — M65.321 TRIGGER INDEX FINGER OF RIGHT HAND: Primary | ICD-10-CM

## 2021-04-20 DIAGNOSIS — M79.641 PAIN OF RIGHT HAND: ICD-10-CM

## 2021-04-20 PROCEDURE — 99999 PR PBB SHADOW E&M-EST. PATIENT-LVL IV: ICD-10-PCS | Mod: PBBFAC,,, | Performed by: ORTHOPAEDIC SURGERY

## 2021-04-20 PROCEDURE — 99213 PR OFFICE/OUTPT VISIT, EST, LEVL III, 20-29 MIN: ICD-10-PCS | Mod: 25,S$GLB,, | Performed by: ORTHOPAEDIC SURGERY

## 2021-04-20 PROCEDURE — 20550 TENDON SHEATH: ICD-10-PCS | Mod: RT,S$GLB,, | Performed by: ORTHOPAEDIC SURGERY

## 2021-04-20 PROCEDURE — 99213 OFFICE O/P EST LOW 20 MIN: CPT | Mod: 25,S$GLB,, | Performed by: ORTHOPAEDIC SURGERY

## 2021-04-20 PROCEDURE — 20550 NJX 1 TENDON SHEATH/LIGAMENT: CPT | Mod: RT,S$GLB,, | Performed by: ORTHOPAEDIC SURGERY

## 2021-04-20 PROCEDURE — 99999 PR PBB SHADOW E&M-EST. PATIENT-LVL IV: CPT | Mod: PBBFAC,,, | Performed by: ORTHOPAEDIC SURGERY

## 2021-04-30 ENCOUNTER — HOSPITAL ENCOUNTER (EMERGENCY)
Facility: HOSPITAL | Age: 51
Discharge: HOME | End: 2021-05-01
Attending: EMERGENCY MEDICINE
Payer: COMMERCIAL

## 2021-04-30 VITALS
HEART RATE: 83 BPM | SYSTOLIC BLOOD PRESSURE: 119 MMHG | BODY MASS INDEX: 30.78 KG/M2 | RESPIRATION RATE: 20 BRPM | DIASTOLIC BLOOD PRESSURE: 80 MMHG | OXYGEN SATURATION: 97 % | HEIGHT: 70 IN | TEMPERATURE: 99 F | WEIGHT: 215 LBS

## 2021-04-30 DIAGNOSIS — R52 PAIN: ICD-10-CM

## 2021-04-30 DIAGNOSIS — M77.12 LATERAL EPICONDYLITIS OF LEFT ELBOW: Primary | ICD-10-CM

## 2021-04-30 PROCEDURE — 73080 X-RAY EXAM OF ELBOW: CPT | Mod: TC,FY,LT

## 2021-04-30 PROCEDURE — 73080 XR ELBOW COMPLETE 3 VIEW LEFT: ICD-10-PCS | Mod: 26,LT,, | Performed by: RADIOLOGY

## 2021-04-30 PROCEDURE — 73080 X-RAY EXAM OF ELBOW: CPT | Mod: 26,LT,, | Performed by: RADIOLOGY

## 2021-04-30 PROCEDURE — 99284 EMERGENCY DEPT VISIT MOD MDM: CPT | Mod: 25

## 2021-05-01 RX ORDER — HYDROCODONE BITARTRATE AND ACETAMINOPHEN 7.5; 325 MG/1; MG/1
1 TABLET ORAL EVERY 6 HOURS PRN
Qty: 8 TABLET | Refills: 0 | Status: SHIPPED | OUTPATIENT
Start: 2021-05-01 | End: 2021-07-25

## 2021-05-01 RX ORDER — PREDNISONE 10 MG/1
10 TABLET ORAL DAILY
Qty: 21 TABLET | Refills: 0 | Status: SHIPPED | OUTPATIENT
Start: 2021-05-01 | End: 2021-05-11

## 2021-05-01 RX ORDER — METHOCARBAMOL 500 MG/1
500 TABLET, FILM COATED ORAL 3 TIMES DAILY
Qty: 30 TABLET | Refills: 0 | Status: SHIPPED | OUTPATIENT
Start: 2021-05-01 | End: 2021-05-06

## 2021-05-17 ENCOUNTER — LAB VISIT (OUTPATIENT)
Dept: LAB | Facility: HOSPITAL | Age: 51
End: 2021-05-17
Attending: FAMILY MEDICINE
Payer: COMMERCIAL

## 2021-05-17 DIAGNOSIS — E11.9 TYPE 2 DIABETES MELLITUS WITHOUT COMPLICATION, WITHOUT LONG-TERM CURRENT USE OF INSULIN: ICD-10-CM

## 2021-05-17 DIAGNOSIS — Z11.59 NEED FOR HEPATITIS C SCREENING TEST: ICD-10-CM

## 2021-05-17 LAB
ALBUMIN SERPL BCP-MCNC: 4.5 G/DL (ref 3.5–5.2)
ALP SERPL-CCNC: 78 U/L (ref 55–135)
ALT SERPL W/O P-5'-P-CCNC: 29 U/L (ref 10–44)
ANION GAP SERPL CALC-SCNC: 11 MMOL/L (ref 8–16)
AST SERPL-CCNC: 21 U/L (ref 10–40)
BASOPHILS # BLD AUTO: 0.06 K/UL (ref 0–0.2)
BASOPHILS NFR BLD: 0.9 % (ref 0–1.9)
BILIRUB SERPL-MCNC: 0.9 MG/DL (ref 0.1–1)
BUN SERPL-MCNC: 24 MG/DL (ref 6–20)
CALCIUM SERPL-MCNC: 9.5 MG/DL (ref 8.7–10.5)
CHLORIDE SERPL-SCNC: 99 MMOL/L (ref 95–110)
CHOLEST SERPL-MCNC: 172 MG/DL (ref 120–199)
CHOLEST/HDLC SERPL: 4.8 {RATIO} (ref 2–5)
CO2 SERPL-SCNC: 27 MMOL/L (ref 23–29)
CREAT SERPL-MCNC: 1.1 MG/DL (ref 0.5–1.4)
DIFFERENTIAL METHOD: NORMAL
EOSINOPHIL # BLD AUTO: 0.2 K/UL (ref 0–0.5)
EOSINOPHIL NFR BLD: 2.6 % (ref 0–8)
ERYTHROCYTE [DISTWIDTH] IN BLOOD BY AUTOMATED COUNT: 11.9 % (ref 11.5–14.5)
EST. GFR  (AFRICAN AMERICAN): >60 ML/MIN/1.73 M^2
EST. GFR  (NON AFRICAN AMERICAN): >60 ML/MIN/1.73 M^2
ESTIMATED AVG GLUCOSE: 212 MG/DL (ref 68–131)
GLUCOSE SERPL-MCNC: 183 MG/DL (ref 70–110)
HBA1C MFR BLD: 9 % (ref 4.5–6.2)
HCT VFR BLD AUTO: 47.8 % (ref 40–54)
HCV AB SERPL QL IA: NEGATIVE
HDLC SERPL-MCNC: 36 MG/DL (ref 40–75)
HDLC SERPL: 20.9 % (ref 20–50)
HGB BLD-MCNC: 16 G/DL (ref 14–18)
IMM GRANULOCYTES # BLD AUTO: 0.02 K/UL (ref 0–0.04)
IMM GRANULOCYTES NFR BLD AUTO: 0.3 % (ref 0–0.5)
LDLC SERPL CALC-MCNC: 100 MG/DL (ref 63–159)
LYMPHOCYTES # BLD AUTO: 1.6 K/UL (ref 1–4.8)
LYMPHOCYTES NFR BLD: 24.1 % (ref 18–48)
MCH RBC QN AUTO: 29.7 PG (ref 27–31)
MCHC RBC AUTO-ENTMCNC: 33.5 G/DL (ref 32–36)
MCV RBC AUTO: 89 FL (ref 82–98)
MONOCYTES # BLD AUTO: 0.7 K/UL (ref 0.3–1)
MONOCYTES NFR BLD: 10.5 % (ref 4–15)
NEUTROPHILS # BLD AUTO: 4 K/UL (ref 1.8–7.7)
NEUTROPHILS NFR BLD: 61.6 % (ref 38–73)
NONHDLC SERPL-MCNC: 136 MG/DL
NRBC BLD-RTO: 0 /100 WBC
PLATELET # BLD AUTO: 323 K/UL (ref 150–450)
PMV BLD AUTO: 9.3 FL (ref 9.2–12.9)
POTASSIUM SERPL-SCNC: 4.2 MMOL/L (ref 3.5–5.1)
PROT SERPL-MCNC: 8 G/DL (ref 6–8.4)
RBC # BLD AUTO: 5.39 M/UL (ref 4.6–6.2)
SODIUM SERPL-SCNC: 137 MMOL/L (ref 136–145)
TRIGL SERPL-MCNC: 180 MG/DL (ref 30–150)
TSH SERPL DL<=0.005 MIU/L-ACNC: 2.04 UIU/ML (ref 0.34–5.6)
WBC # BLD AUTO: 6.48 K/UL (ref 3.9–12.7)

## 2021-05-17 PROCEDURE — 86803 HEPATITIS C AB TEST: CPT | Performed by: FAMILY MEDICINE

## 2021-05-17 PROCEDURE — 80053 COMPREHEN METABOLIC PANEL: CPT | Performed by: FAMILY MEDICINE

## 2021-05-17 PROCEDURE — 83036 HEMOGLOBIN GLYCOSYLATED A1C: CPT | Performed by: FAMILY MEDICINE

## 2021-05-17 PROCEDURE — 80061 LIPID PANEL: CPT | Performed by: FAMILY MEDICINE

## 2021-05-17 PROCEDURE — 36415 COLL VENOUS BLD VENIPUNCTURE: CPT | Performed by: FAMILY MEDICINE

## 2021-05-17 PROCEDURE — 85025 COMPLETE CBC W/AUTO DIFF WBC: CPT | Performed by: FAMILY MEDICINE

## 2021-05-17 PROCEDURE — 84443 ASSAY THYROID STIM HORMONE: CPT | Performed by: FAMILY MEDICINE

## 2021-06-05 ENCOUNTER — PATIENT MESSAGE (OUTPATIENT)
Dept: FAMILY MEDICINE | Facility: CLINIC | Age: 51
End: 2021-06-05

## 2021-06-07 RX ORDER — AZITHROMYCIN 250 MG/1
TABLET, FILM COATED ORAL
Qty: 6 TABLET | Refills: 0 | Status: SHIPPED | OUTPATIENT
Start: 2021-06-07 | End: 2021-07-25

## 2021-06-16 ENCOUNTER — PATIENT MESSAGE (OUTPATIENT)
Dept: FAMILY MEDICINE | Facility: CLINIC | Age: 51
End: 2021-06-16

## 2021-06-22 ENCOUNTER — TELEPHONE (OUTPATIENT)
Dept: FAMILY MEDICINE | Facility: CLINIC | Age: 51
End: 2021-06-22

## 2021-06-23 ENCOUNTER — PATIENT MESSAGE (OUTPATIENT)
Dept: FAMILY MEDICINE | Facility: CLINIC | Age: 51
End: 2021-06-23

## 2021-07-14 ENCOUNTER — PATIENT OUTREACH (OUTPATIENT)
Dept: ADMINISTRATIVE | Facility: HOSPITAL | Age: 51
End: 2021-07-14

## 2021-07-14 ENCOUNTER — PATIENT MESSAGE (OUTPATIENT)
Dept: ADMINISTRATIVE | Facility: HOSPITAL | Age: 51
End: 2021-07-14

## 2021-07-16 ENCOUNTER — PATIENT OUTREACH (OUTPATIENT)
Dept: ADMINISTRATIVE | Facility: OTHER | Age: 51
End: 2021-07-16

## 2021-07-20 ENCOUNTER — PATIENT MESSAGE (OUTPATIENT)
Dept: FAMILY MEDICINE | Facility: CLINIC | Age: 51
End: 2021-07-20

## 2021-07-20 DIAGNOSIS — Z00.00 PHYSICAL EXAM, ROUTINE: Primary | ICD-10-CM

## 2021-07-22 ENCOUNTER — OFFICE VISIT (OUTPATIENT)
Dept: PODIATRY | Facility: CLINIC | Age: 51
End: 2021-07-22
Payer: COMMERCIAL

## 2021-07-22 VITALS
TEMPERATURE: 98 F | RESPIRATION RATE: 16 BRPM | HEART RATE: 80 BPM | DIASTOLIC BLOOD PRESSURE: 66 MMHG | SYSTOLIC BLOOD PRESSURE: 102 MMHG | BODY MASS INDEX: 30.26 KG/M2 | WEIGHT: 211.38 LBS | HEIGHT: 70 IN

## 2021-07-22 DIAGNOSIS — E11.49 TYPE II DIABETES MELLITUS WITH NEUROLOGICAL MANIFESTATIONS: Primary | ICD-10-CM

## 2021-07-22 DIAGNOSIS — B35.1 ONYCHOMYCOSIS OF TOENAIL: ICD-10-CM

## 2021-07-22 DIAGNOSIS — G57.93 NEUROPATHIC PAIN OF BOTH FEET: ICD-10-CM

## 2021-07-22 PROCEDURE — 99214 PR OFFICE/OUTPT VISIT, EST, LEVL IV, 30-39 MIN: ICD-10-PCS | Mod: S$GLB,,, | Performed by: PODIATRIST

## 2021-07-22 PROCEDURE — 99999 PR PBB SHADOW E&M-EST. PATIENT-LVL V: ICD-10-PCS | Mod: PBBFAC,,, | Performed by: PODIATRIST

## 2021-07-22 PROCEDURE — 99214 OFFICE O/P EST MOD 30 MIN: CPT | Mod: S$GLB,,, | Performed by: PODIATRIST

## 2021-07-22 PROCEDURE — 99999 PR PBB SHADOW E&M-EST. PATIENT-LVL V: CPT | Mod: PBBFAC,,, | Performed by: PODIATRIST

## 2021-07-25 RX ORDER — CLOTRIMAZOLE 1 G/ML
SOLUTION TOPICAL 2 TIMES DAILY
Qty: 30 BOTTLE | Refills: 1 | Status: SHIPPED | OUTPATIENT
Start: 2021-07-25

## 2021-07-26 ENCOUNTER — LAB VISIT (OUTPATIENT)
Dept: LAB | Facility: HOSPITAL | Age: 51
End: 2021-07-26
Attending: FAMILY MEDICINE
Payer: COMMERCIAL

## 2021-07-26 DIAGNOSIS — Z00.00 PHYSICAL EXAM, ROUTINE: ICD-10-CM

## 2021-07-26 LAB
ALBUMIN SERPL BCP-MCNC: 4.1 G/DL (ref 3.5–5.2)
ALP SERPL-CCNC: 75 U/L (ref 55–135)
ALT SERPL W/O P-5'-P-CCNC: 21 U/L (ref 10–44)
ANION GAP SERPL CALC-SCNC: 7 MMOL/L (ref 8–16)
AST SERPL-CCNC: 18 U/L (ref 10–40)
BASOPHILS # BLD AUTO: 0.07 K/UL (ref 0–0.2)
BASOPHILS NFR BLD: 1.1 % (ref 0–1.9)
BILIRUB SERPL-MCNC: 0.6 MG/DL (ref 0.1–1)
BUN SERPL-MCNC: 17 MG/DL (ref 6–20)
CALCIUM SERPL-MCNC: 9.4 MG/DL (ref 8.7–10.5)
CHLORIDE SERPL-SCNC: 100 MMOL/L (ref 95–110)
CHOLEST SERPL-MCNC: 143 MG/DL (ref 120–199)
CHOLEST/HDLC SERPL: 4.2 {RATIO} (ref 2–5)
CO2 SERPL-SCNC: 29 MMOL/L (ref 23–29)
CREAT SERPL-MCNC: 1.3 MG/DL (ref 0.5–1.4)
DIFFERENTIAL METHOD: NORMAL
EOSINOPHIL # BLD AUTO: 0.2 K/UL (ref 0–0.5)
EOSINOPHIL NFR BLD: 2.7 % (ref 0–8)
ERYTHROCYTE [DISTWIDTH] IN BLOOD BY AUTOMATED COUNT: 12 % (ref 11.5–14.5)
EST. GFR  (AFRICAN AMERICAN): >60 ML/MIN/1.73 M^2
EST. GFR  (NON AFRICAN AMERICAN): >60 ML/MIN/1.73 M^2
ESTIMATED AVG GLUCOSE: 151 MG/DL (ref 68–131)
GLUCOSE SERPL-MCNC: 136 MG/DL (ref 70–110)
HBA1C MFR BLD: 6.9 % (ref 4–5.6)
HCT VFR BLD AUTO: 44.4 % (ref 40–54)
HDLC SERPL-MCNC: 34 MG/DL (ref 40–75)
HDLC SERPL: 23.8 % (ref 20–50)
HGB BLD-MCNC: 15.5 G/DL (ref 14–18)
IMM GRANULOCYTES # BLD AUTO: 0.01 K/UL (ref 0–0.04)
IMM GRANULOCYTES NFR BLD AUTO: 0.2 % (ref 0–0.5)
LDLC SERPL CALC-MCNC: 84.6 MG/DL (ref 63–159)
LYMPHOCYTES # BLD AUTO: 1.8 K/UL (ref 1–4.8)
LYMPHOCYTES NFR BLD: 26.7 % (ref 18–48)
MCH RBC QN AUTO: 30.9 PG (ref 27–31)
MCHC RBC AUTO-ENTMCNC: 34.9 G/DL (ref 32–36)
MCV RBC AUTO: 89 FL (ref 82–98)
MONOCYTES # BLD AUTO: 0.9 K/UL (ref 0.3–1)
MONOCYTES NFR BLD: 12.9 % (ref 4–15)
NEUTROPHILS # BLD AUTO: 3.7 K/UL (ref 1.8–7.7)
NEUTROPHILS NFR BLD: 56.4 % (ref 38–73)
NONHDLC SERPL-MCNC: 109 MG/DL
NRBC BLD-RTO: 0 /100 WBC
PLATELET # BLD AUTO: 317 K/UL (ref 150–450)
PMV BLD AUTO: 9.3 FL (ref 9.2–12.9)
POTASSIUM SERPL-SCNC: 4.1 MMOL/L (ref 3.5–5.1)
PROT SERPL-MCNC: 7.8 G/DL (ref 6–8.4)
RBC # BLD AUTO: 5.01 M/UL (ref 4.6–6.2)
SODIUM SERPL-SCNC: 136 MMOL/L (ref 136–145)
TRIGL SERPL-MCNC: 122 MG/DL (ref 30–150)
TSH SERPL DL<=0.005 MIU/L-ACNC: 1.41 UIU/ML (ref 0.4–4)
WBC # BLD AUTO: 6.58 K/UL (ref 3.9–12.7)

## 2021-07-26 PROCEDURE — 36415 COLL VENOUS BLD VENIPUNCTURE: CPT | Performed by: FAMILY MEDICINE

## 2021-07-26 PROCEDURE — 84443 ASSAY THYROID STIM HORMONE: CPT | Performed by: FAMILY MEDICINE

## 2021-07-26 PROCEDURE — 82043 UR ALBUMIN QUANTITATIVE: CPT | Performed by: FAMILY MEDICINE

## 2021-07-26 PROCEDURE — 80053 COMPREHEN METABOLIC PANEL: CPT | Performed by: FAMILY MEDICINE

## 2021-07-26 PROCEDURE — 82570 ASSAY OF URINE CREATININE: CPT | Performed by: FAMILY MEDICINE

## 2021-07-26 PROCEDURE — 85025 COMPLETE CBC W/AUTO DIFF WBC: CPT | Performed by: FAMILY MEDICINE

## 2021-07-26 PROCEDURE — 80061 LIPID PANEL: CPT | Performed by: FAMILY MEDICINE

## 2021-07-26 PROCEDURE — 83036 HEMOGLOBIN GLYCOSYLATED A1C: CPT | Performed by: FAMILY MEDICINE

## 2021-07-27 LAB
ALBUMIN/CREAT UR: 10.4 UG/MG (ref 0–30)
CREAT UR-MCNC: 96 MG/DL (ref 23–375)
MICROALBUMIN UR DL<=1MG/L-MCNC: 10 UG/ML

## 2021-07-28 ENCOUNTER — CLINICAL SUPPORT (OUTPATIENT)
Dept: FAMILY MEDICINE | Facility: CLINIC | Age: 51
End: 2021-07-28
Attending: FAMILY MEDICINE
Payer: COMMERCIAL

## 2021-07-28 ENCOUNTER — OFFICE VISIT (OUTPATIENT)
Dept: FAMILY MEDICINE | Facility: CLINIC | Age: 51
End: 2021-07-28
Payer: COMMERCIAL

## 2021-07-28 VITALS
BODY MASS INDEX: 31.61 KG/M2 | OXYGEN SATURATION: 97 % | HEIGHT: 70 IN | HEART RATE: 83 BPM | SYSTOLIC BLOOD PRESSURE: 111 MMHG | RESPIRATION RATE: 16 BRPM | WEIGHT: 220.81 LBS | DIASTOLIC BLOOD PRESSURE: 74 MMHG

## 2021-07-28 DIAGNOSIS — E11.9 TYPE 2 DIABETES MELLITUS WITHOUT COMPLICATION, WITHOUT LONG-TERM CURRENT USE OF INSULIN: Primary | ICD-10-CM

## 2021-07-28 DIAGNOSIS — Z12.11 COLON CANCER SCREENING: ICD-10-CM

## 2021-07-28 DIAGNOSIS — Z00.00 PHYSICAL EXAM, ROUTINE: ICD-10-CM

## 2021-07-28 PROCEDURE — 99214 OFFICE O/P EST MOD 30 MIN: CPT | Mod: S$GLB,,, | Performed by: FAMILY MEDICINE

## 2021-07-28 PROCEDURE — 99999 PR PBB SHADOW E&M-EST. PATIENT-LVL III: CPT | Mod: PBBFAC,,, | Performed by: FAMILY MEDICINE

## 2021-07-28 PROCEDURE — 92228 IMG RTA DETC/MNTR DS PHY/QHP: CPT | Mod: TC,S$GLB,, | Performed by: FAMILY MEDICINE

## 2021-07-28 PROCEDURE — 99214 PR OFFICE/OUTPT VISIT, EST, LEVL IV, 30-39 MIN: ICD-10-PCS | Mod: S$GLB,,, | Performed by: FAMILY MEDICINE

## 2021-07-28 PROCEDURE — 99999 PR PBB SHADOW E&M-EST. PATIENT-LVL III: ICD-10-PCS | Mod: PBBFAC,,, | Performed by: FAMILY MEDICINE

## 2021-07-28 PROCEDURE — 92228 DIABETIC EYE SCREENING PHOTO: ICD-10-PCS | Mod: TC,S$GLB,, | Performed by: FAMILY MEDICINE

## 2021-07-28 RX ORDER — LISINOPRIL 20 MG/1
10 TABLET ORAL DAILY
Qty: 45 TABLET | Refills: 3
Start: 2021-07-28 | End: 2021-09-12 | Stop reason: SDUPTHER

## 2021-08-31 LAB — NONINV COLON CA DNA+OCC BLD SCRN STL QL: NEGATIVE

## 2021-09-11 ENCOUNTER — PATIENT MESSAGE (OUTPATIENT)
Dept: FAMILY MEDICINE | Facility: CLINIC | Age: 51
End: 2021-09-11

## 2021-09-12 RX ORDER — LISINOPRIL 10 MG/1
10 TABLET ORAL DAILY
Qty: 90 TABLET | Refills: 3 | Status: SHIPPED | OUTPATIENT
Start: 2021-09-12 | End: 2022-09-12

## 2021-09-26 NOTE — PROGRESS NOTES
83 y/o gentleman with a past medical history of Alzheimer's dementia, ESRD on HD (oliguric), Anemia of chronic disease, COPD on 4L O2, DM II, Hfw/REF and Severe AS, HLD, and recently discharge for multi-lobar pneumonia admitted for septic shock from [facility]. Began having fevers on Sunday and complaining of abdominal pain, without any other significant symptoms (n/v/d, SOB, CP, cough). The following day he was noted to be febrile and hypotensive, and anuric.   Still found to be hypotensive in the ED after 1L IVF and started on peripheral levophed, WBC 14, CXR appears improved from last on 9/2.     Septic Shock due to Legionella PNA and Proteus UTI, shock resolved  Metabolic Encephalopathy, improved  Ervin pus in maldonado, remains off levophed, Ucx with ESBL proteus  < from: CT Abdomen and Pelvis w/ IV Cont (09.22.21 @ 13:24) >  .  Findings compatible with acute cystitis and left-sided ascending urinary tract infection; partially distended urinary bladder despite presence of a Maldonado catheter. Correlate clinically for catheter function.  2.  Large diffuse colonic stool burden with moderately distended rectum and mild presacral edema.  3.  Incompletely characterized cystic lesion in the pancreatic head. This can be further evaluated with an outpatient contrast-enhanced MRI/MRCP.  9/21 Blood cx : staph epi  Urine Legionella +  ID following, recs vancomycin, cefepime and levaquin  tesio removed per ID recs, [place] placed by vascular  f/u ID when can replace tesio catheter   2d echo EF 45-50%, severe AS  repeat blood cx NGTD  Miralax Q12H and senna  Monitor BM, check AM KUB    Chronic hypoxic respiratory failure   COPD on 4L home O2  HFw/REF (40%)  Severe AS  DLD  Recent hx of multilobar pneumonia s/p therapeutic thoracocentesis  Doesn't appear in an exacerbation  Cont with nebs PRN, not on standing ICS or LABA/LAMA  No diuresis for now, monitor for overload.     ESRD on HD (oliguric):  HAGMA  Hyponatremia   -Nephro following, for HD as per schedule via [place]  -Cont ca-acetate and midodrine    Alzheimer's dementia  -as per family, patient's mentation improved   -Cont donepezil, sertraline, and mirtazepine     #Progress Note Handoff  Pending (specify):   clinical improvement,  possible tesio next week ID f/u   Disposition:  from NH    Debbie Ortega MD  s. [phone]   Subjective:       Patient ID: Umberto Okeefe is a 49 y.o. male.    Chief Complaint: Follow-up (BW review, will schedule eye appt, would like to discuss vaccinations) and Medication Refill    In regard to the patient's diabetes, patient reports that blood sugars have been well controlled. Patient Denies hypoglycemia. Patient is  currently on insulin therapy. Patient is on ACE/ARB and is on statin. Last a1c was Hemoglobin A1C       Date                     Value               Ref Range           Status                10/11/2019               7.5 (H)             4.5 - 6.2 %         Final              Comment:    According to ADA guidelines, hemoglobin A1C <7.0% represents  optimal control in non-pregnant diabetic patients.  Different  metrics may apply to specific populations.   Standards of Medical Care in Diabetes - 2016.  For the purpose of screening for the presence of diabetes:  <5.7%     Consistent with the absence of diabetes  5.7-6.4%  Consistent with increasing risk for diabetes   (prediabetes)  >or=6.5%  Consistent with diabetes  Currently no consensus exists for use of hemoglobin A1C  for diagnosis of diabetes for children.         06/24/2019               9.9 (H)             4.5 - 6.2 %         Final              Comment:    According to ADA guidelines, hemoglobin A1C <7.0% represents  optimal control in non-pregnant diabetic patients.  Different  metrics may apply to specific populations.   Standards of Medical Care in Diabetes - 2016.  For the purpose of screening for the presence of diabetes:  <5.7%     Consistent with the absence of diabetes  5.7-6.4%  Consistent with increasing risk for diabetes   (prediabetes)  >or=6.5%  Consistent with diabetes  Currently no consensus exists for use of hemoglobin A1C  for diagnosis of diabetes for children.    ----------    In regards to the patient's hypertension, patient denies chest pain/sob, and reports compliance with medication regimen.    In regards to the  "patient's dyslipidemia, patient reports compliance with medication regimen without side effects.      Review of Systems   Constitutional: Negative for activity change, appetite change, fatigue and fever.   HENT: Negative for congestion, dental problem, ear discharge, hearing loss, postnasal drip, sinus pain, sneezing and trouble swallowing.    Eyes: Negative for photophobia and discharge.   Respiratory: Negative for apnea, cough and shortness of breath.    Cardiovascular: Negative for chest pain.   Gastrointestinal: Negative for abdominal distention, abdominal pain, blood in stool, diarrhea, nausea and vomiting.   Endocrine: Negative for cold intolerance.   Genitourinary: Negative for difficulty urinating, flank pain, frequency, hematuria and testicular pain.   Musculoskeletal: Positive for arthralgias, back pain, gait problem and myalgias.   Skin: Negative for color change.   Allergic/Immunologic: Negative for environmental allergies, food allergies and immunocompromised state.   Neurological: Negative for dizziness, syncope, numbness and headaches.   Hematological: Negative for adenopathy. Does not bruise/bleed easily.   Psychiatric/Behavioral: Negative for agitation, confusion, decreased concentration, hallucinations, self-injury and sleep disturbance.   All other systems reviewed and are negative.        Reviewed family, medical, surgical, and social history.    Objective:      /65 (BP Location: Left arm, Patient Position: Sitting, BP Method: Medium (Automatic))   Pulse 78   Resp 18   Ht 5' 10" (1.778 m)   Wt 93.8 kg (206 lb 12.8 oz)   SpO2 96%   BMI 29.67 kg/m²   Physical Exam   Constitutional: He is oriented to person, place, and time. He appears well-developed and well-nourished. No distress.   HENT:   Head: Normocephalic and atraumatic.   Nose: Nose normal.   Mouth/Throat: Oropharynx is clear and moist. No oropharyngeal exudate.   Eyes: Pupils are equal, round, and reactive to light. Conjunctivae " and EOM are normal.   Neck: Normal range of motion. No thyromegaly present.   Cardiovascular: Normal rate, regular rhythm, normal heart sounds and intact distal pulses. Exam reveals no gallop and no friction rub.   No murmur heard.  Pulmonary/Chest: Effort normal and breath sounds normal. No respiratory distress. He has no wheezes. He has no rales.   Abdominal: Soft. He exhibits no distension. There is no tenderness. There is no guarding.   Musculoskeletal: Normal range of motion. He exhibits tenderness and deformity. He exhibits no edema.   Neurological: He is alert and oriented to person, place, and time. He displays normal reflexes. No cranial nerve deficit or sensory deficit. He exhibits normal muscle tone. Coordination normal.   Skin: Skin is warm and dry. No rash noted. He is not diaphoretic. No erythema. No pallor.   Psychiatric: He has a normal mood and affect. His behavior is normal. Judgment and thought content normal.   Nursing note and vitals reviewed.      Assessment:       1. Need for vaccination    2. PLMD (periodic limb movement disorder)    3. Type 2 diabetes mellitus without complication, without long-term current use of insulin        Plan:       Need for vaccination  -     Tdap Vaccine  -     Pneumococcal Conjugate Vaccine (13 Valent) (IM)  -     Influenza - Quadrivalent (PF)    PLMD (periodic limb movement disorder)  -     rOPINIRole (REQUIP) 2 MG tablet; Take 1 tablet (2 mg total) by mouth every evening.  Dispense: 30 tablet; Refill: 2    Type 2 diabetes mellitus without complication, without long-term current use of insulin  -     Lipid panel; Future; Expected date: 01/14/2020  -     CBC auto differential; Future; Expected date: 01/14/2020  -     Comprehensive metabolic panel; Future; Expected date: 01/14/2020  -     TSH; Future; Expected date: 01/14/2020  -     Microalbumin/creatinine urine ratio; Future; Expected date: 01/14/2020  -     Hemoglobin A1c; Future; Expected date:  01/14/2020    Other orders  -     ofloxacin (FLOXIN) 0.3 % otic solution; Place 5 drops into the right ear once daily.  Dispense: 5 mL; Refill: 0            Risks, benefits, and side effects were discussed with the patient. All questions were answered to the fullest satisfaction of the patient, and pt verbalized understanding and agreement to treatment plan. Pt was to call with any new or worsening symptoms, or present to the ER.

## 2021-10-28 ENCOUNTER — LAB VISIT (OUTPATIENT)
Dept: LAB | Facility: HOSPITAL | Age: 51
End: 2021-10-28
Attending: FAMILY MEDICINE
Payer: COMMERCIAL

## 2021-10-28 DIAGNOSIS — E11.9 TYPE 2 DIABETES MELLITUS WITHOUT COMPLICATION, WITHOUT LONG-TERM CURRENT USE OF INSULIN: ICD-10-CM

## 2021-10-28 LAB
ALBUMIN SERPL BCP-MCNC: 4.1 G/DL (ref 3.5–5.2)
ALP SERPL-CCNC: 84 U/L (ref 55–135)
ALT SERPL W/O P-5'-P-CCNC: 22 U/L (ref 10–44)
ANION GAP SERPL CALC-SCNC: 10 MMOL/L (ref 8–16)
AST SERPL-CCNC: 21 U/L (ref 10–40)
BASOPHILS # BLD AUTO: 0.06 K/UL (ref 0–0.2)
BASOPHILS NFR BLD: 1.1 % (ref 0–1.9)
BILIRUB SERPL-MCNC: 0.6 MG/DL (ref 0.1–1)
BUN SERPL-MCNC: 14 MG/DL (ref 6–20)
CALCIUM SERPL-MCNC: 9.1 MG/DL (ref 8.7–10.5)
CHLORIDE SERPL-SCNC: 103 MMOL/L (ref 95–110)
CHOLEST SERPL-MCNC: 126 MG/DL (ref 120–199)
CHOLEST/HDLC SERPL: 4.5 {RATIO} (ref 2–5)
CO2 SERPL-SCNC: 24 MMOL/L (ref 23–29)
CREAT SERPL-MCNC: 1.1 MG/DL (ref 0.5–1.4)
DIFFERENTIAL METHOD: ABNORMAL
EOSINOPHIL # BLD AUTO: 0.2 K/UL (ref 0–0.5)
EOSINOPHIL NFR BLD: 2.9 % (ref 0–8)
ERYTHROCYTE [DISTWIDTH] IN BLOOD BY AUTOMATED COUNT: 11.9 % (ref 11.5–14.5)
EST. GFR  (AFRICAN AMERICAN): >60 ML/MIN/1.73 M^2
EST. GFR  (NON AFRICAN AMERICAN): >60 ML/MIN/1.73 M^2
ESTIMATED AVG GLUCOSE: 154 MG/DL (ref 68–131)
GLUCOSE SERPL-MCNC: 120 MG/DL (ref 70–110)
HBA1C MFR BLD: 7 % (ref 4–5.6)
HCT VFR BLD AUTO: 44.2 % (ref 40–54)
HDLC SERPL-MCNC: 28 MG/DL (ref 40–75)
HDLC SERPL: 22.2 % (ref 20–50)
HGB BLD-MCNC: 15.7 G/DL (ref 14–18)
IMM GRANULOCYTES # BLD AUTO: 0.01 K/UL (ref 0–0.04)
IMM GRANULOCYTES NFR BLD AUTO: 0.2 % (ref 0–0.5)
LDLC SERPL CALC-MCNC: 81.2 MG/DL (ref 63–159)
LYMPHOCYTES # BLD AUTO: 1.4 K/UL (ref 1–4.8)
LYMPHOCYTES NFR BLD: 27.2 % (ref 18–48)
MCH RBC QN AUTO: 31 PG (ref 27–31)
MCHC RBC AUTO-ENTMCNC: 35.5 G/DL (ref 32–36)
MCV RBC AUTO: 87 FL (ref 82–98)
MONOCYTES # BLD AUTO: 0.7 K/UL (ref 0.3–1)
MONOCYTES NFR BLD: 13.5 % (ref 4–15)
NEUTROPHILS # BLD AUTO: 2.9 K/UL (ref 1.8–7.7)
NEUTROPHILS NFR BLD: 55.1 % (ref 38–73)
NONHDLC SERPL-MCNC: 98 MG/DL
NRBC BLD-RTO: 0 /100 WBC
PLATELET # BLD AUTO: 324 K/UL (ref 150–450)
PMV BLD AUTO: 8.9 FL (ref 9.2–12.9)
POTASSIUM SERPL-SCNC: 4 MMOL/L (ref 3.5–5.1)
PROT SERPL-MCNC: 7.5 G/DL (ref 6–8.4)
RBC # BLD AUTO: 5.06 M/UL (ref 4.6–6.2)
SODIUM SERPL-SCNC: 137 MMOL/L (ref 136–145)
TRIGL SERPL-MCNC: 84 MG/DL (ref 30–150)
TSH SERPL DL<=0.005 MIU/L-ACNC: 1.43 UIU/ML (ref 0.4–4)
WBC # BLD AUTO: 5.26 K/UL (ref 3.9–12.7)

## 2021-10-28 PROCEDURE — 80061 LIPID PANEL: CPT | Performed by: FAMILY MEDICINE

## 2021-10-28 PROCEDURE — 84443 ASSAY THYROID STIM HORMONE: CPT | Performed by: FAMILY MEDICINE

## 2021-10-28 PROCEDURE — 36415 COLL VENOUS BLD VENIPUNCTURE: CPT | Performed by: FAMILY MEDICINE

## 2021-10-28 PROCEDURE — 85025 COMPLETE CBC W/AUTO DIFF WBC: CPT | Performed by: FAMILY MEDICINE

## 2021-10-28 PROCEDURE — 80053 COMPREHEN METABOLIC PANEL: CPT | Performed by: FAMILY MEDICINE

## 2021-10-28 PROCEDURE — 83036 HEMOGLOBIN GLYCOSYLATED A1C: CPT | Performed by: FAMILY MEDICINE

## 2021-11-05 ENCOUNTER — OFFICE VISIT (OUTPATIENT)
Dept: FAMILY MEDICINE | Facility: CLINIC | Age: 51
End: 2021-11-05
Payer: COMMERCIAL

## 2021-11-05 VITALS
HEART RATE: 76 BPM | OXYGEN SATURATION: 97 % | HEIGHT: 70 IN | WEIGHT: 225.69 LBS | BODY MASS INDEX: 32.31 KG/M2 | DIASTOLIC BLOOD PRESSURE: 86 MMHG | RESPIRATION RATE: 16 BRPM | SYSTOLIC BLOOD PRESSURE: 138 MMHG

## 2021-11-05 DIAGNOSIS — E11.9 TYPE 2 DIABETES MELLITUS WITHOUT COMPLICATION, WITHOUT LONG-TERM CURRENT USE OF INSULIN: Primary | ICD-10-CM

## 2021-11-05 DIAGNOSIS — J32.9 SINUSITIS, UNSPECIFIED CHRONICITY, UNSPECIFIED LOCATION: ICD-10-CM

## 2021-11-05 PROCEDURE — 96372 PR INJECTION,THERAP/PROPH/DIAG2ST, IM OR SUBCUT: ICD-10-PCS | Mod: S$GLB,,, | Performed by: FAMILY MEDICINE

## 2021-11-05 PROCEDURE — 99214 PR OFFICE/OUTPT VISIT, EST, LEVL IV, 30-39 MIN: ICD-10-PCS | Mod: 25,S$GLB,, | Performed by: FAMILY MEDICINE

## 2021-11-05 PROCEDURE — 96372 THER/PROPH/DIAG INJ SC/IM: CPT | Mod: S$GLB,,, | Performed by: FAMILY MEDICINE

## 2021-11-05 PROCEDURE — 99999 PR PBB SHADOW E&M-EST. PATIENT-LVL IV: ICD-10-PCS | Mod: PBBFAC,,, | Performed by: FAMILY MEDICINE

## 2021-11-05 PROCEDURE — 99214 OFFICE O/P EST MOD 30 MIN: CPT | Mod: 25,S$GLB,, | Performed by: FAMILY MEDICINE

## 2021-11-05 PROCEDURE — 99999 PR PBB SHADOW E&M-EST. PATIENT-LVL IV: CPT | Mod: PBBFAC,,, | Performed by: FAMILY MEDICINE

## 2021-11-05 RX ORDER — AMOXICILLIN AND CLAVULANATE POTASSIUM 875; 125 MG/1; MG/1
1 TABLET, FILM COATED ORAL EVERY 12 HOURS
Qty: 20 TABLET | Refills: 0 | Status: SHIPPED | OUTPATIENT
Start: 2021-11-05 | End: 2021-11-15

## 2021-11-05 RX ORDER — CEFTRIAXONE 1 G/1
1 INJECTION, POWDER, FOR SOLUTION INTRAMUSCULAR; INTRAVENOUS
Status: COMPLETED | OUTPATIENT
Start: 2021-11-05 | End: 2021-11-05

## 2021-11-05 RX ADMIN — CEFTRIAXONE 1 G: 1 INJECTION, POWDER, FOR SOLUTION INTRAMUSCULAR; INTRAVENOUS at 04:11

## 2022-01-04 RX ORDER — ATORVASTATIN CALCIUM 40 MG/1
TABLET, FILM COATED ORAL
Qty: 90 TABLET | Refills: 3 | Status: SHIPPED | OUTPATIENT
Start: 2022-01-04 | End: 2023-01-09

## 2022-01-14 DIAGNOSIS — E11.9 TYPE 2 DIABETES MELLITUS WITHOUT COMPLICATION, WITHOUT LONG-TERM CURRENT USE OF INSULIN: ICD-10-CM

## 2022-01-15 RX ORDER — INSULIN DETEMIR 100 [IU]/ML
INJECTION, SOLUTION SUBCUTANEOUS
Qty: 30 ML | Refills: 11 | Status: SHIPPED | OUTPATIENT
Start: 2022-01-15 | End: 2022-02-12 | Stop reason: SDUPTHER

## 2022-01-18 ENCOUNTER — PATIENT OUTREACH (OUTPATIENT)
Dept: ADMINISTRATIVE | Facility: OTHER | Age: 52
End: 2022-01-18
Payer: COMMERCIAL

## 2022-01-18 NOTE — PROGRESS NOTES
Chart was reviewed for overdue [facility]active [facility] Encounters ([facility])  topics  Updates were requested from care everywhere  Health Maintenance has been updated  [place] immunization registry triggered

## 2022-01-22 ENCOUNTER — PATIENT MESSAGE (OUTPATIENT)
Dept: FAMILY MEDICINE | Facility: CLINIC | Age: 52
End: 2022-01-22
Payer: COMMERCIAL

## 2022-01-24 RX ORDER — DOXYCYCLINE HYCLATE 100 MG
100 TABLET ORAL 2 TIMES DAILY
Qty: 20 TABLET | Refills: 0 | Status: SHIPPED | OUTPATIENT
Start: 2022-01-24 | End: 2022-02-12

## 2022-02-04 ENCOUNTER — LAB VISIT (OUTPATIENT)
Dept: LAB | Facility: HOSPITAL | Age: 52
End: 2022-02-04
Attending: FAMILY MEDICINE
Payer: COMMERCIAL

## 2022-02-04 DIAGNOSIS — E11.9 TYPE 2 DIABETES MELLITUS WITHOUT COMPLICATION, WITHOUT LONG-TERM CURRENT USE OF INSULIN: ICD-10-CM

## 2022-02-04 LAB
ALBUMIN SERPL BCP-MCNC: 4.2 G/DL (ref 3.5–5.2)
ALBUMIN/CREAT UR: 20.5 UG/MG (ref 0–30)
ALP SERPL-CCNC: 80 U/L (ref 55–135)
ALT SERPL W/O P-5'-P-CCNC: 36 U/L (ref 10–44)
ANION GAP SERPL CALC-SCNC: 11 MMOL/L (ref 8–16)
AST SERPL-CCNC: 21 U/L (ref 10–40)
BASOPHILS # BLD AUTO: 0.04 K/UL (ref 0–0.2)
BASOPHILS NFR BLD: 0.8 % (ref 0–1.9)
BILIRUB SERPL-MCNC: 0.8 MG/DL (ref 0.1–1)
BUN SERPL-MCNC: 14 MG/DL (ref 6–20)
CALCIUM SERPL-MCNC: 9.4 MG/DL (ref 8.7–10.5)
CHLORIDE SERPL-SCNC: 101 MMOL/L (ref 95–110)
CHOLEST SERPL-MCNC: 157 MG/DL (ref 120–199)
CHOLEST/HDLC SERPL: 5.4 {RATIO} (ref 2–5)
CO2 SERPL-SCNC: 22 MMOL/L (ref 23–29)
CREAT SERPL-MCNC: 1.1 MG/DL (ref 0.5–1.4)
CREAT UR-MCNC: 112 MG/DL (ref 23–375)
DIFFERENTIAL METHOD: NORMAL
EOSINOPHIL # BLD AUTO: 0.2 K/UL (ref 0–0.5)
EOSINOPHIL NFR BLD: 3.4 % (ref 0–8)
ERYTHROCYTE [DISTWIDTH] IN BLOOD BY AUTOMATED COUNT: 11.9 % (ref 11.5–14.5)
EST. GFR  (AFRICAN AMERICAN): >60 ML/MIN/1.73 M^2
EST. GFR  (NON AFRICAN AMERICAN): >60 ML/MIN/1.73 M^2
ESTIMATED AVG GLUCOSE: 177 MG/DL (ref 68–131)
GLUCOSE SERPL-MCNC: 186 MG/DL (ref 70–110)
HBA1C MFR BLD: 7.8 % (ref 4–5.6)
HCT VFR BLD AUTO: 44.1 % (ref 40–54)
HDLC SERPL-MCNC: 29 MG/DL (ref 40–75)
HDLC SERPL: 18.5 % (ref 20–50)
HGB BLD-MCNC: 15.4 G/DL (ref 14–18)
IMM GRANULOCYTES # BLD AUTO: 0.02 K/UL (ref 0–0.04)
IMM GRANULOCYTES NFR BLD AUTO: 0.4 % (ref 0–0.5)
LDLC SERPL CALC-MCNC: 88.8 MG/DL (ref 63–159)
LYMPHOCYTES # BLD AUTO: 1.5 K/UL (ref 1–4.8)
LYMPHOCYTES NFR BLD: 29 % (ref 18–48)
MCH RBC QN AUTO: 29.9 PG (ref 27–31)
MCHC RBC AUTO-ENTMCNC: 34.9 G/DL (ref 32–36)
MCV RBC AUTO: 86 FL (ref 82–98)
MICROALBUMIN UR DL<=1MG/L-MCNC: 23 UG/ML
MONOCYTES # BLD AUTO: 0.6 K/UL (ref 0.3–1)
MONOCYTES NFR BLD: 11.8 % (ref 4–15)
NEUTROPHILS # BLD AUTO: 2.9 K/UL (ref 1.8–7.7)
NEUTROPHILS NFR BLD: 54.6 % (ref 38–73)
NONHDLC SERPL-MCNC: 128 MG/DL
NRBC BLD-RTO: 0 /100 WBC
PLATELET # BLD AUTO: 300 K/UL (ref 150–450)
PMV BLD AUTO: 9.4 FL (ref 9.2–12.9)
POTASSIUM SERPL-SCNC: 4.3 MMOL/L (ref 3.5–5.1)
PROT SERPL-MCNC: 7.7 G/DL (ref 6–8.4)
RBC # BLD AUTO: 5.15 M/UL (ref 4.6–6.2)
SODIUM SERPL-SCNC: 134 MMOL/L (ref 136–145)
TRIGL SERPL-MCNC: 196 MG/DL (ref 30–150)
TSH SERPL DL<=0.005 MIU/L-ACNC: 1.04 UIU/ML (ref 0.4–4)
WBC # BLD AUTO: 5.25 K/UL (ref 3.9–12.7)

## 2022-02-04 PROCEDURE — 82570 ASSAY OF URINE CREATININE: CPT | Performed by: FAMILY MEDICINE

## 2022-02-04 PROCEDURE — 85025 COMPLETE CBC W/AUTO DIFF WBC: CPT | Performed by: FAMILY MEDICINE

## 2022-02-04 PROCEDURE — 83036 HEMOGLOBIN GLYCOSYLATED A1C: CPT | Performed by: FAMILY MEDICINE

## 2022-02-04 PROCEDURE — 84443 ASSAY THYROID STIM HORMONE: CPT | Performed by: FAMILY MEDICINE

## 2022-02-04 PROCEDURE — 80061 LIPID PANEL: CPT | Performed by: FAMILY MEDICINE

## 2022-02-04 PROCEDURE — 36415 COLL VENOUS BLD VENIPUNCTURE: CPT | Performed by: FAMILY MEDICINE

## 2022-02-04 PROCEDURE — 80053 COMPREHEN METABOLIC PANEL: CPT | Performed by: FAMILY MEDICINE

## 2022-02-05 ENCOUNTER — HOSPITAL ENCOUNTER (EMERGENCY)
Facility: HOSPITAL | Age: 52
Discharge: HOME | End: 2022-02-06
Attending: EMERGENCY MEDICINE
Payer: COMMERCIAL

## 2022-02-05 VITALS
BODY MASS INDEX: 30.92 KG/M2 | SYSTOLIC BLOOD PRESSURE: 121 MMHG | RESPIRATION RATE: 11 BRPM | OXYGEN SATURATION: 97 % | DIASTOLIC BLOOD PRESSURE: 83 MMHG | TEMPERATURE: 99 F | WEIGHT: 216 LBS | HEART RATE: 85 BPM | HEIGHT: 70 IN

## 2022-02-05 DIAGNOSIS — R07.9 CHEST PAIN: ICD-10-CM

## 2022-02-05 DIAGNOSIS — R07.89 ATYPICAL CHEST PAIN: Primary | ICD-10-CM

## 2022-02-05 DIAGNOSIS — K21.9 GASTROESOPHAGEAL REFLUX DISEASE, UNSPECIFIED WHETHER ESOPHAGITIS PRESENT: ICD-10-CM

## 2022-02-05 LAB
ALBUMIN SERPL BCP-MCNC: 4.1 G/DL (ref 3.5–5.2)
ALP SERPL-CCNC: 82 U/L (ref 55–135)
ALT SERPL W/O P-5'-P-CCNC: 37 U/L (ref 10–44)
ANION GAP SERPL CALC-SCNC: 15 MMOL/L (ref 8–16)
AST SERPL-CCNC: 26 U/L (ref 10–40)
BASOPHILS # BLD AUTO: 0.06 K/UL (ref 0–0.2)
BASOPHILS NFR BLD: 0.7 % (ref 0–1.9)
BILIRUB SERPL-MCNC: 0.6 MG/DL (ref 0.1–1)
BNP SERPL-MCNC: <10 PG/ML (ref 0–99)
BUN SERPL-MCNC: 16 MG/DL (ref 6–20)
CALCIUM SERPL-MCNC: 9.3 MG/DL (ref 8.7–10.5)
CHLORIDE SERPL-SCNC: 103 MMOL/L (ref 95–110)
CO2 SERPL-SCNC: 21 MMOL/L (ref 23–29)
CREAT SERPL-MCNC: 1.4 MG/DL (ref 0.5–1.4)
DIFFERENTIAL METHOD: ABNORMAL
EOSINOPHIL # BLD AUTO: 0.2 K/UL (ref 0–0.5)
EOSINOPHIL NFR BLD: 1.7 % (ref 0–8)
ERYTHROCYTE [DISTWIDTH] IN BLOOD BY AUTOMATED COUNT: 12.3 % (ref 11.5–14.5)
EST. GFR  (AFRICAN AMERICAN): >60 ML/MIN/1.73 M^2
EST. GFR  (NON AFRICAN AMERICAN): 58.2 ML/MIN/1.73 M^2
GLUCOSE SERPL-MCNC: 220 MG/DL (ref 70–110)
HCT VFR BLD AUTO: 45.1 % (ref 40–54)
HGB BLD-MCNC: 15.9 G/DL (ref 14–18)
IMM GRANULOCYTES # BLD AUTO: 0.02 K/UL (ref 0–0.04)
IMM GRANULOCYTES NFR BLD AUTO: 0.2 % (ref 0–0.5)
INR PPP: 1 (ref 0.8–1.2)
LYMPHOCYTES # BLD AUTO: 1.9 K/UL (ref 1–4.8)
LYMPHOCYTES NFR BLD: 20.9 % (ref 18–48)
MCH RBC QN AUTO: 30.2 PG (ref 27–31)
MCHC RBC AUTO-ENTMCNC: 35.3 G/DL (ref 32–36)
MCV RBC AUTO: 86 FL (ref 82–98)
MONOCYTES # BLD AUTO: 1.2 K/UL (ref 0.3–1)
MONOCYTES NFR BLD: 13.4 % (ref 4–15)
NEUTROPHILS # BLD AUTO: 5.6 K/UL (ref 1.8–7.7)
NEUTROPHILS NFR BLD: 63.1 % (ref 38–73)
NRBC BLD-RTO: 0 /100 WBC
PLATELET # BLD AUTO: 336 K/UL (ref 150–450)
PMV BLD AUTO: 9.1 FL (ref 9.2–12.9)
POTASSIUM SERPL-SCNC: 4.2 MMOL/L (ref 3.5–5.1)
PROT SERPL-MCNC: 7.7 G/DL (ref 6–8.4)
PROTHROMBIN TIME: 10.4 SEC (ref 9–12.5)
RBC # BLD AUTO: 5.26 M/UL (ref 4.6–6.2)
SODIUM SERPL-SCNC: 139 MMOL/L (ref 136–145)
TROPONIN I SERPL DL<=0.01 NG/ML-MCNC: 0.01 NG/ML (ref 0–0.03)
WBC # BLD AUTO: 8.86 K/UL (ref 3.9–12.7)

## 2022-02-05 PROCEDURE — 99285 EMERGENCY DEPT VISIT HI MDM: CPT | Mod: 25

## 2022-02-05 PROCEDURE — 80053 COMPREHEN METABOLIC PANEL: CPT | Performed by: EMERGENCY MEDICINE

## 2022-02-05 PROCEDURE — 85025 COMPLETE CBC W/AUTO DIFF WBC: CPT | Performed by: EMERGENCY MEDICINE

## 2022-02-05 PROCEDURE — 93005 ELECTROCARDIOGRAM TRACING: CPT

## 2022-02-05 PROCEDURE — 93010 EKG 12-LEAD: ICD-10-PCS | Mod: ,,, | Performed by: INTERNAL MEDICINE

## 2022-02-05 PROCEDURE — 93010 ELECTROCARDIOGRAM REPORT: CPT | Mod: ,,, | Performed by: INTERNAL MEDICINE

## 2022-02-05 PROCEDURE — 83880 ASSAY OF NATRIURETIC PEPTIDE: CPT | Performed by: EMERGENCY MEDICINE

## 2022-02-05 PROCEDURE — 85610 PROTHROMBIN TIME: CPT | Performed by: EMERGENCY MEDICINE

## 2022-02-05 PROCEDURE — 71045 X-RAY EXAM CHEST 1 VIEW: CPT | Mod: TC,FY

## 2022-02-05 PROCEDURE — 84484 ASSAY OF TROPONIN QUANT: CPT | Performed by: EMERGENCY MEDICINE

## 2022-02-05 PROCEDURE — 71045 X-RAY EXAM CHEST 1 VIEW: CPT | Mod: 26,,, | Performed by: RADIOLOGY

## 2022-02-05 PROCEDURE — 25000003 PHARM REV CODE 250: Performed by: EMERGENCY MEDICINE

## 2022-02-05 PROCEDURE — 71045 XR CHEST AP PORTABLE: ICD-10-PCS | Mod: 26,,, | Performed by: RADIOLOGY

## 2022-02-05 RX ORDER — NAPROXEN SODIUM 220 MG/1
324 TABLET, FILM COATED ORAL
Status: COMPLETED | OUTPATIENT
Start: 2022-02-05 | End: 2022-02-05

## 2022-02-05 RX ADMIN — ASPIRIN 324 MG: 81 TABLET, CHEWABLE ORAL at 10:02

## 2022-02-06 NOTE — DISCHARGE INSTRUCTIONS
Your cardiac labs, your EKG, and your chest x-ray tonight were normal.  Your pain is not likely caused by heart issue, but you need to follow-up with Dr. Alba to discuss this and he may order further tests.  Also discuss your heartburn with Dr. Alba.  He may want to have you undergo a procedure to look inside your stomach and esophagus.  Avoid any spicy or greasy foods, continue taking your Pepcid, and you can also try Maalox for unrelieved symptoms.  Return here if worse in any way.

## 2022-02-06 NOTE — ED PROVIDER NOTES
Encounter Date: 2022       History     Chief Complaint   Patient presents with    Chest Pain     Chest pain, dizziness, pain in both shoulders.  Started today at noon, has been off and on.  Patient states he has been having general weakness and heart burn for about one week.     Patient is a 51-year-old male with no cardiac history comes today complaining of not feeling well for almost 1 week, with worsening signs and symptoms of heartburn.  He states he takes Pepcid for this, but it does not seem to be working as well as it used to, and his symptoms have been worsening lately.  Today he developed some tightness between the shoulder blades, and states he feels a sharp pain in the left side of his chest at times.  This pain lasts for a few seconds then goes away.  He has been belching a lot as well.  Denies any dark or bloody stools.  No vomiting.  No fever or chills.  No cough or shortness of breath.  No leg pain or swelling.  Nothing he does makes his symptoms any better or worse.        Review of patient's allergies indicates:   Allergen Reactions    Topamax [topiramate]      Mental agitation      Past Medical History:   Diagnosis Date    Diabetes mellitus, type 2     Hypertension     Neuropathy     Restless leg syndrome      Past Surgical History:   Procedure Laterality Date    KNEE SURGERY       Family History   Problem Relation Age of Onset    Kidney disease Mother     Arthritis Mother     Hearing loss Mother     Kidney disease Maternal Uncle      Social History     Tobacco Use    Smoking status: Former Smoker     Packs/day: 1.00     Years: 15.00     Pack years: 15.00     Types: Cigarettes     Quit date: 2010     Years since quittin.7    Smokeless tobacco: Never Used   Substance Use Topics    Alcohol use: No    Drug use: No     Review of Systems   Constitutional: Negative for chills, fatigue and fever.   HENT: Negative for congestion, rhinorrhea and sore throat.    Eyes: Negative  for photophobia and pain.   Respiratory: Negative for cough, choking, chest tightness and shortness of breath.    Cardiovascular: Positive for chest pain. Negative for palpitations and leg swelling.   Gastrointestinal: Negative for abdominal distention, abdominal pain, blood in stool, constipation, diarrhea, nausea and vomiting.   Endocrine: Negative for polydipsia and polyuria.   Genitourinary: Negative for difficulty urinating, dysuria, flank pain, frequency and hematuria.   Musculoskeletal: Positive for back pain (Tightness between the shoulder blades). Negative for arthralgias, myalgias, neck pain and neck stiffness.   Skin: Negative for pallor and rash.   Neurological: Positive for dizziness (Slight, infrequent). Negative for syncope, weakness and numbness.   Hematological: Does not bruise/bleed easily.   Psychiatric/Behavioral: Negative for confusion. The patient is not nervous/anxious.        Physical Exam     Initial Vitals [02/05/22 2159]   BP Pulse Resp Temp SpO2   (!) 136/101 98 16 98.7 °F (37.1 °C) 97 %      MAP       --         Physical Exam    Nursing note and vitals reviewed.  Constitutional: He appears well-developed and well-nourished. He is not diaphoretic. No distress.   HENT:   Head: Normocephalic and atraumatic.   Nose: Nose normal.   Mouth/Throat: Oropharynx is clear and moist. No oropharyngeal exudate.   Eyes: Conjunctivae and EOM are normal. Pupils are equal, round, and reactive to light. No scleral icterus.   Neck: Neck supple. No JVD present.   Normal range of motion.  Cardiovascular: Normal rate, regular rhythm, normal heart sounds and intact distal pulses.   No murmur heard.  Pulmonary/Chest: Breath sounds normal. No stridor. No respiratory distress. He has no wheezes. He exhibits no tenderness.   Abdominal: Abdomen is soft. Bowel sounds are normal. He exhibits no distension. There is no abdominal tenderness.   Musculoskeletal:         General: No tenderness or edema. Normal range of  motion.      Cervical back: Normal range of motion and neck supple.     Neurological: He is alert and oriented to person, place, and time. He has normal strength and normal reflexes. No cranial nerve deficit or sensory deficit. GCS score is 15. GCS eye subscore is 4. GCS verbal subscore is 5. GCS motor subscore is 6.   Skin: Skin is warm and dry. Capillary refill takes less than 2 seconds. No rash noted. No erythema.   Psychiatric: He has a normal mood and affect. His behavior is normal.         ED Course   Procedures  Labs Reviewed   CBC W/ AUTO DIFFERENTIAL - Abnormal; Notable for the following components:       Result Value    MPV 9.1 (*)     Mono # 1.2 (*)     All other components within normal limits   COMPREHENSIVE METABOLIC PANEL - Abnormal; Notable for the following components:    CO2 21 (*)     Glucose 220 (*)     eGFR if non  58.2 (*)     All other components within normal limits   B-TYPE NATRIURETIC PEPTIDE   TROPONIN I   PROTIME-INR     EKG Readings: (Independently Interpreted)   EKG personally reviewed by me shows normal sinus rhythm at 96 beats per minute.  AL interval 206, . No ST elevations or depressions, no T-wave changes, no arrhythmia.       Imaging Results          X-Ray Chest AP Portable (In process)    Procedure changed from X-Ray Chest PA And Lateral               X-Rays:   Independently Interpreted Readings:   Other Readings:  Chest x-ray personally reviewed by me shows normal cardiac silhouette, normal mediastinum, clear lungs, no pneumothorax, normal skeletal structures.    Medications   aspirin chewable tablet 324 mg (324 mg Oral Given 2/5/22 2212)     Medical Decision Making:   Differential Diagnosis:   Myocardial infarction, myocardial ischemia, aortic dissection, GERD, gastritis, cholecystitis, pancreatitis, muscle strain, pulmonary embolus, etc.  ED Management:  Cardiac workup was performed.  This showed a normal troponin.  Normal EKG, normal chest x-ray.  All of  the labs normal as well.  Patient's blood pressure improved without intervention.  He states his symptoms have improved as well.  His O2 saturations are normal and my suspicion for pulmonary embolus is low.  Also, considered aortic dissection but his mediastinum is normal on x-ray and he denies any ripping or tearing pain.  His symptoms are not consistent with dissection.  By his description, I think he is suffering from GERD with possible esophageal spasms.  He understands that coronary artery blockages are not ruled out and he will have to follow-up with Dr. Alba for further testing.  Recommended that he continues daily Pepcid, and also use Maalox as needed until he sees Dr. Alba.  Patient will return here as needed or if worse in any way.                      Clinical Impression:   Final diagnoses:  [R07.9] Chest pain  [R07.89] Atypical chest pain (Primary)  [K21.9] Gastroesophageal reflux disease, unspecified whether esophagitis present          ED Disposition Condition    Discharge Stable        ED Prescriptions     None        Follow-up Information     Follow up With Specialties Details Why Contact Info    Maicol Alba MD Family Medicine  As scheduled on February 9th [address]  [place] [address] [phone]      [facility] Emergency Dept Emergency Medicine  As needed, If symptoms worsen [address] [address] [phone]           Eduar Levi MD  02/05/22 1616

## 2022-02-08 ENCOUNTER — OFFICE VISIT (OUTPATIENT)
Dept: PODIATRY | Facility: CLINIC | Age: 52
End: 2022-02-08
Payer: COMMERCIAL

## 2022-02-08 VITALS
WEIGHT: 220 LBS | BODY MASS INDEX: 31.5 KG/M2 | HEART RATE: 84 BPM | SYSTOLIC BLOOD PRESSURE: 122 MMHG | RESPIRATION RATE: 18 BRPM | HEIGHT: 70 IN | DIASTOLIC BLOOD PRESSURE: 77 MMHG

## 2022-02-08 DIAGNOSIS — E11.65 UNCONTROLLED TYPE 2 DIABETES MELLITUS WITH HYPERGLYCEMIA: ICD-10-CM

## 2022-02-08 DIAGNOSIS — E11.49 TYPE II DIABETES MELLITUS WITH NEUROLOGICAL MANIFESTATIONS: ICD-10-CM

## 2022-02-08 DIAGNOSIS — G57.93 NEUROPATHIC PAIN OF BOTH FEET: ICD-10-CM

## 2022-02-08 DIAGNOSIS — E11.9 COMPREHENSIVE DIABETIC FOOT EXAMINATION, TYPE 2 DM, ENCOUNTER FOR: Primary | ICD-10-CM

## 2022-02-08 PROCEDURE — 99999 PR PBB SHADOW E&M-EST. PATIENT-LVL IV: ICD-10-PCS | Mod: PBBFAC,,, | Performed by: PODIATRIST

## 2022-02-08 PROCEDURE — 1159F MED LIST DOCD IN RCRD: CPT | Mod: CPTII,S$GLB,, | Performed by: PODIATRIST

## 2022-02-08 PROCEDURE — 3051F PR MOST RECENT HEMOGLOBIN A1C LEVEL 7.0 - < 8.0%: ICD-10-PCS | Mod: CPTII,S$GLB,, | Performed by: PODIATRIST

## 2022-02-08 PROCEDURE — 3074F SYST BP LT 130 MM HG: CPT | Mod: CPTII,S$GLB,, | Performed by: PODIATRIST

## 2022-02-08 PROCEDURE — 3078F PR MOST RECENT DIASTOLIC BLOOD PRESSURE < 80 MM HG: ICD-10-PCS | Mod: CPTII,S$GLB,, | Performed by: PODIATRIST

## 2022-02-08 PROCEDURE — 99214 OFFICE O/P EST MOD 30 MIN: CPT | Mod: S$GLB,,, | Performed by: PODIATRIST

## 2022-02-08 PROCEDURE — 3051F HG A1C>EQUAL 7.0%<8.0%: CPT | Mod: CPTII,S$GLB,, | Performed by: PODIATRIST

## 2022-02-08 PROCEDURE — 3078F DIAST BP <80 MM HG: CPT | Mod: CPTII,S$GLB,, | Performed by: PODIATRIST

## 2022-02-08 PROCEDURE — 1160F RVW MEDS BY RX/DR IN RCRD: CPT | Mod: CPTII,S$GLB,, | Performed by: PODIATRIST

## 2022-02-08 PROCEDURE — 3061F NEG MICROALBUMINURIA REV: CPT | Mod: CPTII,S$GLB,, | Performed by: PODIATRIST

## 2022-02-08 PROCEDURE — 3008F PR BODY MASS INDEX (BMI) DOCUMENTED: ICD-10-PCS | Mod: CPTII,S$GLB,, | Performed by: PODIATRIST

## 2022-02-08 PROCEDURE — 1159F PR MEDICATION LIST DOCUMENTED IN MEDICAL RECORD: ICD-10-PCS | Mod: CPTII,S$GLB,, | Performed by: PODIATRIST

## 2022-02-08 PROCEDURE — 3066F NEPHROPATHY DOC TX: CPT | Mod: CPTII,S$GLB,, | Performed by: PODIATRIST

## 2022-02-08 PROCEDURE — 3008F BODY MASS INDEX DOCD: CPT | Mod: CPTII,S$GLB,, | Performed by: PODIATRIST

## 2022-02-08 PROCEDURE — 99214 PR OFFICE/OUTPT VISIT, EST, LEVL IV, 30-39 MIN: ICD-10-PCS | Mod: S$GLB,,, | Performed by: PODIATRIST

## 2022-02-08 PROCEDURE — 3074F PR MOST RECENT SYSTOLIC BLOOD PRESSURE < 130 MM HG: ICD-10-PCS | Mod: CPTII,S$GLB,, | Performed by: PODIATRIST

## 2022-02-08 PROCEDURE — 3066F PR DOCUMENTATION OF TREATMENT FOR NEPHROPATHY: ICD-10-PCS | Mod: CPTII,S$GLB,, | Performed by: PODIATRIST

## 2022-02-08 PROCEDURE — 1160F PR REVIEW ALL MEDS BY PRESCRIBER/CLIN PHARMACIST DOCUMENTED: ICD-10-PCS | Mod: CPTII,S$GLB,, | Performed by: PODIATRIST

## 2022-02-08 PROCEDURE — 3061F PR NEG MICROALBUMINURIA RESULT DOCUMENTED/REVIEW: ICD-10-PCS | Mod: CPTII,S$GLB,, | Performed by: PODIATRIST

## 2022-02-08 PROCEDURE — 99999 PR PBB SHADOW E&M-EST. PATIENT-LVL IV: CPT | Mod: PBBFAC,,, | Performed by: PODIATRIST

## 2022-02-08 RX ORDER — GABAPENTIN 800 MG/1
TABLET ORAL
COMMUNITY
Start: 2022-01-20 | End: 2022-05-08

## 2022-02-09 ENCOUNTER — HOSPITAL ENCOUNTER (OUTPATIENT)
Dept: RADIOLOGY | Facility: HOSPITAL | Age: 52
Discharge: HOME | End: 2022-02-09
Attending: FAMILY MEDICINE
Payer: COMMERCIAL

## 2022-02-09 ENCOUNTER — OFFICE VISIT (OUTPATIENT)
Dept: FAMILY MEDICINE | Facility: CLINIC | Age: 52
End: 2022-02-09
Payer: COMMERCIAL

## 2022-02-09 VITALS
HEIGHT: 70 IN | HEART RATE: 84 BPM | SYSTOLIC BLOOD PRESSURE: 116 MMHG | RESPIRATION RATE: 15 BRPM | DIASTOLIC BLOOD PRESSURE: 79 MMHG | OXYGEN SATURATION: 98 % | BODY MASS INDEX: 32.78 KG/M2 | WEIGHT: 229 LBS

## 2022-02-09 DIAGNOSIS — R07.9 CHEST PAIN ON EXERTION: Primary | ICD-10-CM

## 2022-02-09 DIAGNOSIS — E11.9 TYPE 2 DIABETES MELLITUS WITHOUT COMPLICATION, WITHOUT LONG-TERM CURRENT USE OF INSULIN: ICD-10-CM

## 2022-02-09 DIAGNOSIS — M25.559 HIP PAIN: ICD-10-CM

## 2022-02-09 DIAGNOSIS — K21.9 GASTROESOPHAGEAL REFLUX DISEASE WITHOUT ESOPHAGITIS: ICD-10-CM

## 2022-02-09 PROCEDURE — 1159F PR MEDICATION LIST DOCUMENTED IN MEDICAL RECORD: ICD-10-PCS | Mod: CPTII,,, | Performed by: FAMILY MEDICINE

## 2022-02-09 PROCEDURE — 3074F SYST BP LT 130 MM HG: CPT | Mod: CPTII,,, | Performed by: FAMILY MEDICINE

## 2022-02-09 PROCEDURE — 3074F PR MOST RECENT SYSTOLIC BLOOD PRESSURE < 130 MM HG: ICD-10-PCS | Mod: CPTII,,, | Performed by: FAMILY MEDICINE

## 2022-02-09 PROCEDURE — 99999 PR PBB SHADOW E&M-EST. PATIENT-LVL V: CPT | Mod: PBBFAC,,, | Performed by: FAMILY MEDICINE

## 2022-02-09 PROCEDURE — 1160F RVW MEDS BY RX/DR IN RCRD: CPT | Mod: CPTII,,, | Performed by: FAMILY MEDICINE

## 2022-02-09 PROCEDURE — 73521 X-RAY EXAM HIPS BI 2 VIEWS: CPT | Mod: TC,FY

## 2022-02-09 PROCEDURE — 99999 PR PBB SHADOW E&M-EST. PATIENT-LVL V: ICD-10-PCS | Mod: PBBFAC,,, | Performed by: FAMILY MEDICINE

## 2022-02-09 PROCEDURE — 73521 X-RAY EXAM HIPS BI 2 VIEWS: CPT | Mod: 26,,, | Performed by: RADIOLOGY

## 2022-02-09 PROCEDURE — 3008F PR BODY MASS INDEX (BMI) DOCUMENTED: ICD-10-PCS | Mod: CPTII,,, | Performed by: FAMILY MEDICINE

## 2022-02-09 PROCEDURE — 73521 XR HIPS BILATERAL 2 VIEW INCL AP PELVIS: ICD-10-PCS | Mod: 26,,, | Performed by: RADIOLOGY

## 2022-02-09 PROCEDURE — 3061F NEG MICROALBUMINURIA REV: CPT | Mod: CPTII,,, | Performed by: FAMILY MEDICINE

## 2022-02-09 PROCEDURE — 3051F HG A1C>EQUAL 7.0%<8.0%: CPT | Mod: CPTII,,, | Performed by: FAMILY MEDICINE

## 2022-02-09 PROCEDURE — 3051F PR MOST RECENT HEMOGLOBIN A1C LEVEL 7.0 - < 8.0%: ICD-10-PCS | Mod: CPTII,,, | Performed by: FAMILY MEDICINE

## 2022-02-09 PROCEDURE — 1159F MED LIST DOCD IN RCRD: CPT | Mod: CPTII,,, | Performed by: FAMILY MEDICINE

## 2022-02-09 PROCEDURE — 1160F PR REVIEW ALL MEDS BY PRESCRIBER/CLIN PHARMACIST DOCUMENTED: ICD-10-PCS | Mod: CPTII,,, | Performed by: FAMILY MEDICINE

## 2022-02-09 PROCEDURE — 99214 PR OFFICE/OUTPT VISIT, EST, LEVL IV, 30-39 MIN: ICD-10-PCS | Mod: ,,, | Performed by: FAMILY MEDICINE

## 2022-02-09 PROCEDURE — 3066F NEPHROPATHY DOC TX: CPT | Mod: CPTII,,, | Performed by: FAMILY MEDICINE

## 2022-02-09 PROCEDURE — 3078F PR MOST RECENT DIASTOLIC BLOOD PRESSURE < 80 MM HG: ICD-10-PCS | Mod: CPTII,,, | Performed by: FAMILY MEDICINE

## 2022-02-09 PROCEDURE — 99214 OFFICE O/P EST MOD 30 MIN: CPT | Mod: ,,, | Performed by: FAMILY MEDICINE

## 2022-02-09 PROCEDURE — 3061F PR NEG MICROALBUMINURIA RESULT DOCUMENTED/REVIEW: ICD-10-PCS | Mod: CPTII,,, | Performed by: FAMILY MEDICINE

## 2022-02-09 PROCEDURE — 3066F PR DOCUMENTATION OF TREATMENT FOR NEPHROPATHY: ICD-10-PCS | Mod: CPTII,,, | Performed by: FAMILY MEDICINE

## 2022-02-09 PROCEDURE — 3008F BODY MASS INDEX DOCD: CPT | Mod: CPTII,,, | Performed by: FAMILY MEDICINE

## 2022-02-09 PROCEDURE — 3078F DIAST BP <80 MM HG: CPT | Mod: CPTII,,, | Performed by: FAMILY MEDICINE

## 2022-02-09 NOTE — PROGRESS NOTES
" Patient ID: Umberto Okeefe is a 51 y.o. male.    Chief Complaint: Follow-up (Pneu vaccine) and Arthritis (Hands, fingers locking up )      Reviewed family, medical, surgical, and social history.    No cp/sob  No change in mental status  No fever  No asymmetrical limb swelling    Objective:      /79 (BP Location: Left arm, Patient Position: Sitting, BP Method: Medium (Automatic))   Pulse 84   Resp 15   Ht 5' 10" (1.778 m)   Wt 103.9 kg (229 lb)   SpO2 98%   BMI 32.86 kg/m²     Assessment:       1. Chest pain on exertion    2. Hip pain    3. Type 2 diabetes mellitus without complication, without long-term current use of insulin    4. Gastroesophageal reflux disease without esophagitis        Plan:       Chest pain on exertion  -     Stress Echo Which stress agent will be used? Treadmill Exercise; Color Flow Doppler? No; Future    Hip pain  -     X-Ray Hips Bilateral 2 View Incl AP Pelvis; Future; Expected date: 02/09/2022  -     Ambulatory referral/consult to Orthopedics; Future; Expected date: 02/16/2022    Type 2 diabetes mellitus without complication, without long-term current use of insulin  -     CBC Auto Differential; Future; Expected date: 05/09/2022  -     Comprehensive Metabolic Panel; Future; Expected date: 05/09/2022  -     TSH; Future; Expected date: 05/09/2022  -     Microalbumin/Creatinine Ratio, Urine; Future; Expected date: 05/09/2022  -     Hemoglobin A1C; Future; Expected date: 05/09/2022    Gastroesophageal reflux disease without esophagitis  -     Ambulatory referral/consult to Gastroenterology; Future; Expected date: 02/16/2022            Reviewed, monitored, evaluated, and assessed chronic conditions as outlined above  Continue current medicines, any changes noted above  Chest pain on exertion  Went to ER, bw and ekg wnl  Radiation to jaw and down left arm, classic symptoms  Will proceed with stress echo  Labs, radiology studies, and procedures/notes from the last 3 months were " reviewed.    Risks, benefits, and side effects were discussed with the patient. All questions were answered to the fullest satisfaction of the patient, and pt verbalized understanding and agreement to treatment plan. Pt was to call with any new or worsening symptoms, or present to the ER.

## 2022-02-13 NOTE — PROGRESS NOTES
"Subjective:       Patient ID: Umberto Okeefe is a 51 y.o. male.    Chief Complaint: Follow-up and Diabetes Mellitus  Patient presents for 6m follow-up type 2 diabetes with neuropathic pain in both feet.  Patient reports he has done extremely well with gabapentin 800 mg 3 times daily, no side effects.  Reports diabetes has been very well controlled, he feels much improved with the addition of Trulicity.  Going for updated A1c with his PCP, Dr. Alba on 7/26.  He expects improved A1c.  Previously was in the 9.0 range. Reports glucose was 127 this morning.  He relates some changes, possible fungus left big toenail, no pain.          Past Medical History:   Diagnosis Date    Diabetes mellitus, type 2     Hypertension     Neuropathy     Restless leg syndrome      Past Surgical History:   Procedure Laterality Date    KNEE SURGERY           Current Outpatient Medications   Medication Sig Dispense Refill    atorvastatin (LIPITOR) 40 MG tablet Take 1 tablet by mouth once daily 90 tablet 3    clotrimazole (LOTRIMIN) 1 % Soln Apply topically 2 (two) times daily. 30 Bottle 1    diclofenac sodium (VOLTAREN) 1 % Gel Apply 2 g topically 3 (three) times daily. 100 g 4    dulaglutide (TRULICITY) 1.5 mg/0.5 mL pen injector Inject 1.5 mg into the skin every 7 days. 12 pen 3    gabapentin (NEURONTIN) 800 MG tablet   0 Refill(s), Maintenance      insulin detemir U-100 (LEVEMIR FLEXTOUCH) 100 unit/mL (3 mL) SubQ InPn pen Inject 47 Units into the skin every evening. 42.3 mL 11    lisinopriL 10 MG tablet Take 1 tablet (10 mg total) by mouth once daily. 90 tablet 3    pen needle, diabetic 31 gauge x 5/16" Ndle Use as directed BID and PRN. Dx: E11.9 100 each 11    RELION PEN NEEDLES 32 gauge x 5/32" Ndle USE AS DIRECTED      THIN LANCETS 26 gauge Misc   5    TRUE METRIX GLUCOSE METER Misc   0    TRUE METRIX GLUCOSE TEST STRIP Strp Use as directed to check blood glucose twice daily and PRN. Dx E11.9. 100 strip 11    " "tadalafiL (CIALIS) 10 MG tablet Take 1 tablet (10 mg total) by mouth daily as needed for Erectile Dysfunction. 30 tablet 0     No current facility-administered medications for this visit.     Review of patient's allergies indicates:   Allergen Reactions    Topamax [topiramate]      Mental agitation     Montelukast Nausea And Vomiting       Review of Systems   HENT: Negative for congestion.    Respiratory: Negative for cough and shortness of breath.    Cardiovascular: Negative for leg swelling.   Musculoskeletal: Negative for gait problem.   All other systems reviewed and are negative.          Objective:      Vitals:    02/08/22 1520   BP: 122/77   Pulse: 84   Resp: 18   Weight: 99.8 kg (220 lb)   Height: 5' 10" (1.778 m)     Physical Exam  Vitals and nursing note reviewed.   Constitutional:       General: He is not in acute distress.     Appearance: He is well-developed.   Cardiovascular:      Pulses:           Dorsalis pedis pulses are 2+ on the right side and 2+ on the left side.        Posterior tibial pulses are 2+ on the right side and 2+ on the left side.   Pulmonary:      Effort: Pulmonary effort is normal.   Musculoskeletal:         General: No swelling or tenderness.      Right foot: Decreased range of motion.      Left foot: Decreased range of motion. Deformity (Elongated 2nd digits with lateral deviation distal phalanx) present.   Feet:      Right foot:      Protective Sensation: 6 sites tested. 6 sites sensed.      Skin integrity: Skin integrity normal.      Left foot:      Protective Sensation: 6 sites tested. 6 sites sensed.      Skin integrity: Skin integrity normal.   Skin:     Capillary Refill: Capillary refill takes 2 to 3 seconds.   Neurological:      General: No focal deficit present.      Comments: Detected [place] monofilament all areas bilateral feet.  Painful paresthesias feet, diabetic neuropathy   Psychiatric:         Mood and Affect: Mood normal.         Behavior: Behavior " normal.             Musculoskeletal   Muscle Strength/Testing and Tone:  Intact, normal tone bilateral   Joints, Bones, and Muscles: Limited ROM 1st MPJ bilateral  AJ equinus bilateral        Walks well unassisted        Presents in appropriate shoes              Contains abnormal data Hemoglobin A1C    Component Ref Range & Units 8 d ago   (2/4/22) 3 mo ago   (10/28/21) 6 mo ago   (7/26/21) 9 mo ago   (5/17/21) 12 mo ago   (2/17/21)   Hemoglobin A1C 4.0 - 5.6 % 7.8 High   7.0 High  CM  6.9 High  CM  9.0 High  R, CM  9.7 High  R, CM    Estimated Avg Glucose 68 - 131 mg/dL 177 High   154 High   151 High   212 High   232 High                Assessment:       1. Comprehensive diabetic foot examination, type 2 DM, encounter for    2. Type II diabetes mellitus with neurological manifestations    3. Uncontrolled type 2 diabetes mellitus with hyperglycemia    4. Neuropathic pain of both feet        Plan:         CONTINUE GABAPENTIN  MG T.I.D.        Diabetic foot exam performed.    Reviewed results with monofilament testing, good sensation in feet which is a good sign considering the extent of neuropathy he has in his feet.    We had a lengthy discussion regarding neuropathic pain, symptoms, potential complications  Reviewed diabetic education  Reviewed benefit of tight(er) control of glucose/diabetes regarding potential foot problems especially neuropathy.     Reviewed again with patient control of diabetes can possibly decrease neuropathic pain in feet  Again reviewed controlling diabetes/A1c is the most important part in treating neuropathic pain.  Reviewed appropriate shoes, wide boots insoles for work, proper shoes  indoors to protect feet    Discussed maintenance fungal nails, skin and potential complications.    Reviewed need for daily foot checks and instructed patient to contact the office with any area of redness or swelling which has not improved within 3 days.  Patient was in understanding and agreement  with treatment plan.  I counseled the patient on his conditions, their implications and medical management.  Instructed patient to contact the office with any changes, questions, concerns, worsening of symptoms. Patient verbalized understanding.   Total face-to-face time, exam, assessment, treatment, discussion, documentation 30 minutes, more than half this time spent on consultation and coordination of care.  Follow up 12 months      This note was created using M*Modal voice recognition software that occasionally misinterpreted phrases or words.

## 2022-02-25 ENCOUNTER — OFFICE VISIT (OUTPATIENT)
Dept: ORTHOPEDICS | Facility: CLINIC | Age: 52
End: 2022-02-25
Payer: COMMERCIAL

## 2022-02-25 VITALS — BODY MASS INDEX: 32.79 KG/M2 | WEIGHT: 229.06 LBS | RESPIRATION RATE: 19 BRPM | HEIGHT: 70 IN

## 2022-02-25 DIAGNOSIS — M70.61 GREATER TROCHANTERIC BURSITIS OF BOTH HIPS: Primary | ICD-10-CM

## 2022-02-25 DIAGNOSIS — M47.817 DJD (DEGENERATIVE JOINT DISEASE), LUMBOSACRAL: ICD-10-CM

## 2022-02-25 DIAGNOSIS — M70.62 GREATER TROCHANTERIC BURSITIS OF BOTH HIPS: Primary | ICD-10-CM

## 2022-02-25 DIAGNOSIS — M16.0 BILATERAL HIP JOINT ARTHRITIS: ICD-10-CM

## 2022-02-25 DIAGNOSIS — M25.559 HIP PAIN: ICD-10-CM

## 2022-02-25 PROCEDURE — 99214 OFFICE O/P EST MOD 30 MIN: CPT | Mod: 25,S$GLB,, | Performed by: ORTHOPAEDIC SURGERY

## 2022-02-25 PROCEDURE — 20610 LARGE JOINT ASPIRATION/INJECTION: BILATERAL GREATER TROCHANTERIC BURSA: ICD-10-PCS | Mod: 50,S$GLB,, | Performed by: ORTHOPAEDIC SURGERY

## 2022-02-25 PROCEDURE — 3051F PR MOST RECENT HEMOGLOBIN A1C LEVEL 7.0 - < 8.0%: ICD-10-PCS | Mod: CPTII,S$GLB,, | Performed by: ORTHOPAEDIC SURGERY

## 2022-02-25 PROCEDURE — 99999 PR PBB SHADOW E&M-EST. PATIENT-LVL III: ICD-10-PCS | Mod: PBBFAC,,, | Performed by: ORTHOPAEDIC SURGERY

## 2022-02-25 PROCEDURE — 3061F PR NEG MICROALBUMINURIA RESULT DOCUMENTED/REVIEW: ICD-10-PCS | Mod: CPTII,S$GLB,, | Performed by: ORTHOPAEDIC SURGERY

## 2022-02-25 PROCEDURE — 99999 PR PBB SHADOW E&M-EST. PATIENT-LVL III: CPT | Mod: PBBFAC,,, | Performed by: ORTHOPAEDIC SURGERY

## 2022-02-25 PROCEDURE — 3008F BODY MASS INDEX DOCD: CPT | Mod: CPTII,S$GLB,, | Performed by: ORTHOPAEDIC SURGERY

## 2022-02-25 PROCEDURE — 1159F MED LIST DOCD IN RCRD: CPT | Mod: CPTII,S$GLB,, | Performed by: ORTHOPAEDIC SURGERY

## 2022-02-25 PROCEDURE — 3066F NEPHROPATHY DOC TX: CPT | Mod: CPTII,S$GLB,, | Performed by: ORTHOPAEDIC SURGERY

## 2022-02-25 PROCEDURE — 1159F PR MEDICATION LIST DOCUMENTED IN MEDICAL RECORD: ICD-10-PCS | Mod: CPTII,S$GLB,, | Performed by: ORTHOPAEDIC SURGERY

## 2022-02-25 PROCEDURE — 3008F PR BODY MASS INDEX (BMI) DOCUMENTED: ICD-10-PCS | Mod: CPTII,S$GLB,, | Performed by: ORTHOPAEDIC SURGERY

## 2022-02-25 PROCEDURE — 99214 PR OFFICE/OUTPT VISIT, EST, LEVL IV, 30-39 MIN: ICD-10-PCS | Mod: 25,S$GLB,, | Performed by: ORTHOPAEDIC SURGERY

## 2022-02-25 PROCEDURE — 20610 DRAIN/INJ JOINT/BURSA W/O US: CPT | Mod: 50,S$GLB,, | Performed by: ORTHOPAEDIC SURGERY

## 2022-02-25 PROCEDURE — 3066F PR DOCUMENTATION OF TREATMENT FOR NEPHROPATHY: ICD-10-PCS | Mod: CPTII,S$GLB,, | Performed by: ORTHOPAEDIC SURGERY

## 2022-02-25 PROCEDURE — 3051F HG A1C>EQUAL 7.0%<8.0%: CPT | Mod: CPTII,S$GLB,, | Performed by: ORTHOPAEDIC SURGERY

## 2022-02-25 PROCEDURE — 3061F NEG MICROALBUMINURIA REV: CPT | Mod: CPTII,S$GLB,, | Performed by: ORTHOPAEDIC SURGERY

## 2022-02-25 RX ORDER — TRIAMCINOLONE ACETONIDE 40 MG/ML
40 INJECTION, SUSPENSION INTRA-ARTICULAR; INTRAMUSCULAR
Status: DISCONTINUED | OUTPATIENT
Start: 2022-02-25 | End: 2022-02-25 | Stop reason: HOSPADM

## 2022-02-25 RX ADMIN — TRIAMCINOLONE ACETONIDE 40 MG: 40 INJECTION, SUSPENSION INTRA-ARTICULAR; INTRAMUSCULAR at 02:02
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
## 2022-02-25 NOTE — PROCEDURES
Large Joint Aspiration/Injection: bilateral greater trochanteric bursa    Date/Time: 2/25/2022 2:30 PM  Performed by: Andrew Lawson DO  Authorized by: Andrew Lawson, DO     Consent Done?:  Yes (Verbal)  Indications:  Diagnostic evaluation and pain  Site marked: the procedure site was marked    Timeout: prior to procedure the correct patient, procedure, and site was verified    Prep: patient was prepped and draped in usual sterile fashion      Details:  Needle Size:  22 G  Ultrasonic Guidance for needle placement?: No    Approach:  Lateral  Location:  Hip  Laterality:  Bilateral  Site:  Bilateral greater trochanteric bursa  Medications (Right):  40 mg triamcinolone acetonide 40 mg/mL  Medications (Left):  40 mg triamcinolone acetonide 40 mg/mL  Patient tolerance:  Patient tolerated the procedure well with no immediate complications

## 2022-03-14 DIAGNOSIS — M79.642 PAIN OF LEFT HAND: Primary | ICD-10-CM

## 2022-03-16 ENCOUNTER — TELEPHONE (OUTPATIENT)
Dept: ORTHOPEDICS | Facility: CLINIC | Age: 52
End: 2022-03-16
Payer: COMMERCIAL

## 2022-03-28 ENCOUNTER — TELEPHONE (OUTPATIENT)
Dept: FAMILY MEDICINE | Facility: CLINIC | Age: 52
End: 2022-03-28
Payer: COMMERCIAL

## 2022-03-28 DIAGNOSIS — M79.642 PAIN IN BOTH HANDS: Primary | ICD-10-CM

## 2022-03-28 DIAGNOSIS — M79.641 PAIN IN BOTH HANDS: Primary | ICD-10-CM

## 2022-03-28 NOTE — TELEPHONE ENCOUNTER
----- Message from Arleth Childers MA sent at 3/28/2022  4:26 PM CDT -----  Contact: pt  Needs bilateral hand xray   Just before [facility] appt   Call back

## 2022-04-06 ENCOUNTER — OFFICE VISIT (OUTPATIENT)
Dept: ORTHOPEDICS | Facility: CLINIC | Age: 52
End: 2022-04-06
Payer: COMMERCIAL

## 2022-04-06 ENCOUNTER — HOSPITAL ENCOUNTER (OUTPATIENT)
Dept: RADIOLOGY | Facility: HOSPITAL | Age: 52
Discharge: HOME | End: 2022-04-06
Attending: ORTHOPAEDIC SURGERY
Payer: COMMERCIAL

## 2022-04-06 VITALS — RESPIRATION RATE: 18 BRPM | HEIGHT: 70 IN | BODY MASS INDEX: 32.79 KG/M2 | WEIGHT: 229.06 LBS

## 2022-04-06 DIAGNOSIS — M79.641 PAIN IN BOTH HANDS: ICD-10-CM

## 2022-04-06 DIAGNOSIS — M65.332 TRIGGER MIDDLE FINGER OF LEFT HAND: Primary | ICD-10-CM

## 2022-04-06 DIAGNOSIS — M92.211 KIENBOCK'S DISEASE, RIGHT: ICD-10-CM

## 2022-04-06 DIAGNOSIS — M18.12 ARTHRITIS OF CARPOMETACARPAL (CMC) JOINT OF LEFT THUMB: ICD-10-CM

## 2022-04-06 DIAGNOSIS — M65.331 TRIGGER MIDDLE FINGER OF RIGHT HAND: ICD-10-CM

## 2022-04-06 DIAGNOSIS — M79.642 PAIN IN BOTH HANDS: ICD-10-CM

## 2022-04-06 PROCEDURE — 73130 XR HAND COMPLETE 3 VIEWS BILATERAL: ICD-10-PCS | Mod: 26,50,, | Performed by: RADIOLOGY

## 2022-04-06 PROCEDURE — 99999 PR PBB SHADOW E&M-EST. PATIENT-LVL III: CPT | Mod: PBBFAC,,, | Performed by: ORTHOPAEDIC SURGERY

## 2022-04-06 PROCEDURE — 73130 X-RAY EXAM OF HAND: CPT | Mod: 26,50,, | Performed by: RADIOLOGY

## 2022-04-06 PROCEDURE — 73130 X-RAY EXAM OF HAND: CPT | Mod: TC,50,FY

## 2022-04-06 PROCEDURE — 20550 NJX 1 TENDON SHEATH/LIGAMENT: CPT | Mod: 59,50,S$GLB, | Performed by: ORTHOPAEDIC SURGERY

## 2022-04-06 PROCEDURE — 99999 PR PBB SHADOW E&M-EST. PATIENT-LVL III: ICD-10-PCS | Mod: PBBFAC,,, | Performed by: ORTHOPAEDIC SURGERY

## 2022-04-06 PROCEDURE — 99214 OFFICE O/P EST MOD 30 MIN: CPT | Mod: 25,S$GLB,, | Performed by: ORTHOPAEDIC SURGERY

## 2022-04-06 PROCEDURE — 20600 SMALL JOINT ASPIRATION/INJECTION: L THUMB CMC: ICD-10-PCS | Mod: 51,LT,S$GLB, | Performed by: ORTHOPAEDIC SURGERY

## 2022-04-06 PROCEDURE — 1159F MED LIST DOCD IN RCRD: CPT | Mod: CPTII,S$GLB,, | Performed by: ORTHOPAEDIC SURGERY

## 2022-04-06 PROCEDURE — 99214 PR OFFICE/OUTPT VISIT, EST, LEVL IV, 30-39 MIN: ICD-10-PCS | Mod: 25,S$GLB,, | Performed by: ORTHOPAEDIC SURGERY

## 2022-04-06 PROCEDURE — 3051F HG A1C>EQUAL 7.0%<8.0%: CPT | Mod: CPTII,S$GLB,, | Performed by: ORTHOPAEDIC SURGERY

## 2022-04-06 PROCEDURE — 3061F NEG MICROALBUMINURIA REV: CPT | Mod: CPTII,S$GLB,, | Performed by: ORTHOPAEDIC SURGERY

## 2022-04-06 PROCEDURE — 3066F NEPHROPATHY DOC TX: CPT | Mod: CPTII,S$GLB,, | Performed by: ORTHOPAEDIC SURGERY

## 2022-04-06 PROCEDURE — 3061F PR NEG MICROALBUMINURIA RESULT DOCUMENTED/REVIEW: ICD-10-PCS | Mod: CPTII,S$GLB,, | Performed by: ORTHOPAEDIC SURGERY

## 2022-04-06 PROCEDURE — 4010F ACE/ARB THERAPY RXD/TAKEN: CPT | Mod: CPTII,S$GLB,, | Performed by: ORTHOPAEDIC SURGERY

## 2022-04-06 PROCEDURE — 20550 TENDON SHEATH: ICD-10-PCS | Mod: 59,50,S$GLB, | Performed by: ORTHOPAEDIC SURGERY

## 2022-04-06 PROCEDURE — 3008F BODY MASS INDEX DOCD: CPT | Mod: CPTII,S$GLB,, | Performed by: ORTHOPAEDIC SURGERY

## 2022-04-06 PROCEDURE — 3066F PR DOCUMENTATION OF TREATMENT FOR NEPHROPATHY: ICD-10-PCS | Mod: CPTII,S$GLB,, | Performed by: ORTHOPAEDIC SURGERY

## 2022-04-06 PROCEDURE — 20600 DRAIN/INJ JOINT/BURSA W/O US: CPT | Mod: 51,LT,S$GLB, | Performed by: ORTHOPAEDIC SURGERY

## 2022-04-06 PROCEDURE — 1159F PR MEDICATION LIST DOCUMENTED IN MEDICAL RECORD: ICD-10-PCS | Mod: CPTII,S$GLB,, | Performed by: ORTHOPAEDIC SURGERY

## 2022-04-06 PROCEDURE — 4010F PR ACE/ARB THEARPY RXD/TAKEN: ICD-10-PCS | Mod: CPTII,S$GLB,, | Performed by: ORTHOPAEDIC SURGERY

## 2022-04-06 PROCEDURE — 3008F PR BODY MASS INDEX (BMI) DOCUMENTED: ICD-10-PCS | Mod: CPTII,S$GLB,, | Performed by: ORTHOPAEDIC SURGERY

## 2022-04-06 PROCEDURE — 3051F PR MOST RECENT HEMOGLOBIN A1C LEVEL 7.0 - < 8.0%: ICD-10-PCS | Mod: CPTII,S$GLB,, | Performed by: ORTHOPAEDIC SURGERY

## 2022-04-06 NOTE — PROGRESS NOTES
Subjective:      Patient ID: Umberto Okeefe is a 52 y.o. male.    Chief Complaint: Pain of the Right Hand and Pain of the Left Hand      HPI:  Mr. Okeefe is right-hand-dominant and returned today with complaints of triggering of his left middle finger, and right middle finger.  He also has pain at the base of his left thumb.  His symptoms began approximately 1 month ago and a progressively worsen.  He has taken NSAIDs without help.  He has not been wearing splints.  He has not done occupational therapy.  He has had trigger fingers in the past treated with steroid injections which have resolved his symptoms.  Gripping things increases his symptoms his symptoms are worse when he wakes in the mornings    ROS:  No new diagnosis/surgery/prescriptions since last office visit on 02/25/2022.  Constitution: Negative for chills and fever.   HENT: Negative for congestion.    Eyes: Negative for blurred vision.   Respiratory: Negative for cough.    Endocrine: Negative for polydipsia.   Hematologic/Lymphatic: Negative for adenopathy.   Skin: Negative for flushing, itching and rash.   Musculoskeletal: Positive for joint pain and joint swelling. Negative for gout.   Gastrointestinal: Positive for heartburn. Negative for constipation and diarrhea.   Genitourinary: Negative for nocturia.   Neurological: Negative for headaches and seizures.   Psychiatric/Behavioral: Negative for depression and substance abuse. The patient is not nervous/anxious.    Allergic/Immunologic: Positive for environmental allergies.       Objective:      Physical Exam:   General: AAOx3.  No acute distress  Vascular:  Pulses intact and equal bilaterally.  Capillary refill less than 3 seconds and equal bilaterally  Neurologic:  Pinprick and soft touch intact and equal bilaterally  Integment:  No ecchymosis, no errythema  Extremity:  Wrist/hand:  Pronation/supination equal bilaterally 85/85 degrees.  Dorsiflexion/volar flexion equal bilaterally 70/65  degrees.  Nontender in the anatomic snuffbox bilaterally.  Nontender at the 1st dorsal compartment bilaterally.  No swelling at the 1st dorsal compartment bilaterally.  Finkelstein's negative bilaterally.  Grind test positive left thumb.  Tender with palpation 1st CMC joint left thumb.  Nontender at the scapholunate interval bilaterally.  Clark's test negative bilaterally.  Nontender at the DRUJ/TFCC bilaterally.  Ulnar impaction test negative bilaterally.  Wartenberg sign negative bilaterally.  Durkan's test negative bilaterally.  Volar nodules palpable at the MCP joint of both middle fingers.  Triggering both middle fingers.  Tender with motion both middle fingers when triggering happens.  Tender with palpation volar nodules both middle fingers.  Radiography:  Personally reviewed x-rays of both hands completed on 04/06/2022 showed radiocarpal and carpal carpal arthritis with changes of the right lunate consistent with Keinbock's disease there is also 1st CMC arthritis bilaterally      Assessment:       Impression:     1. Trigger middle finger of left hand    2. Trigger middle finger of right hand    3.   4. Arthritis of carpometacarpal (CMC) joint of left thumb   Keinbock's right wrist.         Plan:       1.  Discussed physical examination and radiographic findings with the patient. Umberto understands that he has triggering of the middle fingers of both of his hands and 1st CMC arthritis of his left thumb which is symptomatic.  Treatment alternatives and outcomes were discussed with the patient he understands he could be treated conservatively with observation, activity modification, NSAIDs, bracing, physical therapy, injections, or he could consider surgical intervention such as A1 pulley release and 1st CMC implant arthroplasty.  Recommend he trial conservative management for a little bit longer and if he fails further conservative care then surgical intervention can be entertained.  2. Offered a steroid  injection to the flexor tendon sheath of both middle fingers, he elected to proceed.  3. Offered a steroid injection to the 1st CMC joint of the left thumb, he elected to proceed.  4. IP taping/PIP taping was shown discussed with the patient supplies were given.  5. Take NSAIDs as tolerated allowed by PCM.  6. Recommend the patient purchase over-the-counter Voltaren gel and applied to his hands twice daily and massage in for 2 minutes.  7. First CMC brace, 1 was fitted to the patient.  8. Follow up p.r.n..  If he has not improved at next visit may discuss A1 pulley release along with possible 1st CMC implant arthroplasty at that time.

## 2022-04-06 NOTE — PROCEDURES
Tendon Sheath    Date/Time: 4/6/2022 3:00 PM  Performed by: Andrew Lawson DO  Authorized by: Andrew Lawson, DO     Consent Done?:  Yes (Verbal)  Indications:  Pain and diagnostic evaluation  Site marked: the procedure site was marked    Timeout: prior to procedure the correct patient, procedure, and site was verified    Prep: patient was prepped and draped in usual sterile fashion      Location:  Long finger  Site:  L long flexor tendon sheath and R long flexor tendon sheath  Ultrasonic guidance for needle placement?: No    Needle size:  25 G  Approach:  Volar  Medications:  10 mg triamcinolone acetonide 10 mg/mL; 10 mg triamcinolone acetonide 10 mg/mL  Patient tolerance:  Patient tolerated the procedure well with no immediate complications

## 2022-04-06 NOTE — PROCEDURES
Small Joint Aspiration/Injection: L thumb CMC    Date/Time: 4/6/2022 3:00 PM  Performed by: Andrew Lawson DO  Authorized by: Andrew Lawson, DO     Consent Done?:  Yes (Verbal)  Indications:  Arthritis, diagnostic evaluation and pain  Site marked: the procedure site was marked    Timeout: prior to procedure the correct patient, procedure, and site was verified    Prep: patient was prepped and draped in usual sterile fashion      Location:  Thumb  Site:  L thumb CMC  Ultrasonic guidance for needle placement?: No    Needle size:  25 G  Approach:  Radial  Medications:  10 mg triamcinolone acetonide 10 mg/mL  Patient tolerance:  Patient tolerated the procedure well with no immediate complications

## 2022-04-07 ENCOUNTER — DOCUMENTATION ONLY (OUTPATIENT)
Dept: FAMILY MEDICINE | Facility: CLINIC | Age: 52
End: 2022-04-07
Payer: COMMERCIAL

## 2022-04-07 NOTE — PROGRESS NOTES
PA approved for Trulicity. This drug has been approved. Approved quantity: 4 units per 28 day(s). The drug has been approved from 03/24/2022 to 04/07/2023.

## 2022-05-09 ENCOUNTER — LAB VISIT (OUTPATIENT)
Dept: LAB | Facility: HOSPITAL | Age: 52
End: 2022-05-09
Attending: FAMILY MEDICINE
Payer: COMMERCIAL

## 2022-05-09 DIAGNOSIS — E11.9 TYPE 2 DIABETES MELLITUS WITHOUT COMPLICATION, WITHOUT LONG-TERM CURRENT USE OF INSULIN: ICD-10-CM

## 2022-05-09 LAB
ALBUMIN SERPL BCP-MCNC: 4.2 G/DL (ref 3.5–5.2)
ALBUMIN/CREAT UR: 17.4 UG/MG (ref 0–30)
ALP SERPL-CCNC: 84 U/L (ref 55–135)
ALT SERPL W/O P-5'-P-CCNC: 38 U/L (ref 10–44)
ANION GAP SERPL CALC-SCNC: 11 MMOL/L (ref 8–16)
AST SERPL-CCNC: 29 U/L (ref 10–40)
BASOPHILS # BLD AUTO: 0.05 K/UL (ref 0–0.2)
BASOPHILS NFR BLD: 0.7 % (ref 0–1.9)
BILIRUB SERPL-MCNC: 1 MG/DL (ref 0.1–1)
BUN SERPL-MCNC: 22 MG/DL (ref 6–20)
CALCIUM SERPL-MCNC: 9.8 MG/DL (ref 8.7–10.5)
CHLORIDE SERPL-SCNC: 103 MMOL/L (ref 95–110)
CO2 SERPL-SCNC: 22 MMOL/L (ref 23–29)
CREAT SERPL-MCNC: 1.2 MG/DL (ref 0.5–1.4)
CREAT UR-MCNC: 115 MG/DL (ref 23–375)
DIFFERENTIAL METHOD: ABNORMAL
EOSINOPHIL # BLD AUTO: 0.1 K/UL (ref 0–0.5)
EOSINOPHIL NFR BLD: 1.8 % (ref 0–8)
ERYTHROCYTE [DISTWIDTH] IN BLOOD BY AUTOMATED COUNT: 12.5 % (ref 11.5–14.5)
EST. GFR  (AFRICAN AMERICAN): >60 ML/MIN/1.73 M^2
EST. GFR  (NON AFRICAN AMERICAN): >60 ML/MIN/1.73 M^2
ESTIMATED AVG GLUCOSE: 169 MG/DL (ref 68–131)
GLUCOSE SERPL-MCNC: 127 MG/DL (ref 70–110)
HBA1C MFR BLD: 7.5 % (ref 4–5.6)
HCT VFR BLD AUTO: 44.2 % (ref 40–54)
HGB BLD-MCNC: 15.3 G/DL (ref 14–18)
IMM GRANULOCYTES # BLD AUTO: 0.02 K/UL (ref 0–0.04)
IMM GRANULOCYTES NFR BLD AUTO: 0.3 % (ref 0–0.5)
LYMPHOCYTES # BLD AUTO: 1.4 K/UL (ref 1–4.8)
LYMPHOCYTES NFR BLD: 21 % (ref 18–48)
MCH RBC QN AUTO: 30.4 PG (ref 27–31)
MCHC RBC AUTO-ENTMCNC: 34.6 G/DL (ref 32–36)
MCV RBC AUTO: 88 FL (ref 82–98)
MICROALBUMIN UR DL<=1MG/L-MCNC: 20 UG/ML
MONOCYTES # BLD AUTO: 0.7 K/UL (ref 0.3–1)
MONOCYTES NFR BLD: 10.8 % (ref 4–15)
NEUTROPHILS # BLD AUTO: 4.4 K/UL (ref 1.8–7.7)
NEUTROPHILS NFR BLD: 65.4 % (ref 38–73)
NRBC BLD-RTO: 0 /100 WBC
PLATELET # BLD AUTO: 324 K/UL (ref 150–450)
PMV BLD AUTO: 8.8 FL (ref 9.2–12.9)
POTASSIUM SERPL-SCNC: 4.2 MMOL/L (ref 3.5–5.1)
PROT SERPL-MCNC: 7.7 G/DL (ref 6–8.4)
RBC # BLD AUTO: 5.03 M/UL (ref 4.6–6.2)
SODIUM SERPL-SCNC: 136 MMOL/L (ref 136–145)
TSH SERPL DL<=0.005 MIU/L-ACNC: 2.1 UIU/ML (ref 0.4–4)
WBC # BLD AUTO: 6.73 K/UL (ref 3.9–12.7)

## 2022-05-09 PROCEDURE — 82570 ASSAY OF URINE CREATININE: CPT | Performed by: FAMILY MEDICINE

## 2022-05-09 PROCEDURE — 84443 ASSAY THYROID STIM HORMONE: CPT | Performed by: FAMILY MEDICINE

## 2022-05-09 PROCEDURE — 83036 HEMOGLOBIN GLYCOSYLATED A1C: CPT | Performed by: FAMILY MEDICINE

## 2022-05-09 PROCEDURE — 80053 COMPREHEN METABOLIC PANEL: CPT | Performed by: FAMILY MEDICINE

## 2022-05-09 PROCEDURE — 36415 COLL VENOUS BLD VENIPUNCTURE: CPT | Performed by: FAMILY MEDICINE

## 2022-05-09 PROCEDURE — 85025 COMPLETE CBC W/AUTO DIFF WBC: CPT | Performed by: FAMILY MEDICINE

## 2022-05-16 ENCOUNTER — OFFICE VISIT (OUTPATIENT)
Dept: FAMILY MEDICINE | Facility: CLINIC | Age: 52
End: 2022-05-16
Payer: COMMERCIAL

## 2022-05-16 VITALS
HEART RATE: 80 BPM | HEIGHT: 70 IN | DIASTOLIC BLOOD PRESSURE: 81 MMHG | SYSTOLIC BLOOD PRESSURE: 126 MMHG | WEIGHT: 226.69 LBS | OXYGEN SATURATION: 96 % | RESPIRATION RATE: 19 BRPM | BODY MASS INDEX: 32.45 KG/M2

## 2022-05-16 DIAGNOSIS — E11.9 TYPE 2 DIABETES MELLITUS WITHOUT COMPLICATION, WITHOUT LONG-TERM CURRENT USE OF INSULIN: Primary | ICD-10-CM

## 2022-05-16 DIAGNOSIS — I10 ESSENTIAL HYPERTENSION: ICD-10-CM

## 2022-05-16 PROCEDURE — 1159F PR MEDICATION LIST DOCUMENTED IN MEDICAL RECORD: ICD-10-PCS | Mod: CPTII,S$GLB,, | Performed by: FAMILY MEDICINE

## 2022-05-16 PROCEDURE — 99999 PR PBB SHADOW E&M-EST. PATIENT-LVL III: ICD-10-PCS | Mod: PBBFAC,,, | Performed by: FAMILY MEDICINE

## 2022-05-16 PROCEDURE — 3066F PR DOCUMENTATION OF TREATMENT FOR NEPHROPATHY: ICD-10-PCS | Mod: CPTII,S$GLB,, | Performed by: FAMILY MEDICINE

## 2022-05-16 PROCEDURE — 1160F RVW MEDS BY RX/DR IN RCRD: CPT | Mod: CPTII,S$GLB,, | Performed by: FAMILY MEDICINE

## 2022-05-16 PROCEDURE — 3061F NEG MICROALBUMINURIA REV: CPT | Mod: CPTII,S$GLB,, | Performed by: FAMILY MEDICINE

## 2022-05-16 PROCEDURE — 3079F PR MOST RECENT DIASTOLIC BLOOD PRESSURE 80-89 MM HG: ICD-10-PCS | Mod: CPTII,S$GLB,, | Performed by: FAMILY MEDICINE

## 2022-05-16 PROCEDURE — 1160F PR REVIEW ALL MEDS BY PRESCRIBER/CLIN PHARMACIST DOCUMENTED: ICD-10-PCS | Mod: CPTII,S$GLB,, | Performed by: FAMILY MEDICINE

## 2022-05-16 PROCEDURE — 3079F DIAST BP 80-89 MM HG: CPT | Mod: CPTII,S$GLB,, | Performed by: FAMILY MEDICINE

## 2022-05-16 PROCEDURE — 3066F NEPHROPATHY DOC TX: CPT | Mod: CPTII,S$GLB,, | Performed by: FAMILY MEDICINE

## 2022-05-16 PROCEDURE — 3061F PR NEG MICROALBUMINURIA RESULT DOCUMENTED/REVIEW: ICD-10-PCS | Mod: CPTII,S$GLB,, | Performed by: FAMILY MEDICINE

## 2022-05-16 PROCEDURE — 3074F PR MOST RECENT SYSTOLIC BLOOD PRESSURE < 130 MM HG: ICD-10-PCS | Mod: CPTII,S$GLB,, | Performed by: FAMILY MEDICINE

## 2022-05-16 PROCEDURE — 4010F PR ACE/ARB THEARPY RXD/TAKEN: ICD-10-PCS | Mod: CPTII,S$GLB,, | Performed by: FAMILY MEDICINE

## 2022-05-16 PROCEDURE — 99214 OFFICE O/P EST MOD 30 MIN: CPT | Mod: S$GLB,,, | Performed by: FAMILY MEDICINE

## 2022-05-16 PROCEDURE — 3008F PR BODY MASS INDEX (BMI) DOCUMENTED: ICD-10-PCS | Mod: CPTII,S$GLB,, | Performed by: FAMILY MEDICINE

## 2022-05-16 PROCEDURE — 4010F ACE/ARB THERAPY RXD/TAKEN: CPT | Mod: CPTII,S$GLB,, | Performed by: FAMILY MEDICINE

## 2022-05-16 PROCEDURE — 99214 PR OFFICE/OUTPT VISIT, EST, LEVL IV, 30-39 MIN: ICD-10-PCS | Mod: S$GLB,,, | Performed by: FAMILY MEDICINE

## 2022-05-16 PROCEDURE — 3008F BODY MASS INDEX DOCD: CPT | Mod: CPTII,S$GLB,, | Performed by: FAMILY MEDICINE

## 2022-05-16 PROCEDURE — 3074F SYST BP LT 130 MM HG: CPT | Mod: CPTII,S$GLB,, | Performed by: FAMILY MEDICINE

## 2022-05-16 PROCEDURE — 99999 PR PBB SHADOW E&M-EST. PATIENT-LVL III: CPT | Mod: PBBFAC,,, | Performed by: FAMILY MEDICINE

## 2022-05-16 PROCEDURE — 3051F HG A1C>EQUAL 7.0%<8.0%: CPT | Mod: CPTII,S$GLB,, | Performed by: FAMILY MEDICINE

## 2022-05-16 PROCEDURE — 1159F MED LIST DOCD IN RCRD: CPT | Mod: CPTII,S$GLB,, | Performed by: FAMILY MEDICINE

## 2022-05-16 PROCEDURE — 3051F PR MOST RECENT HEMOGLOBIN A1C LEVEL 7.0 - < 8.0%: ICD-10-PCS | Mod: CPTII,S$GLB,, | Performed by: FAMILY MEDICINE

## 2022-05-16 NOTE — PROGRESS NOTES
" Patient ID: Umberto Okeefe is a 52 y.o. male.    Chief Complaint: Follow-up (BW review)      Reviewed family, medical, surgical, and social history.    No cp/sob  No change in mental status  No fever  No asymmetrical limb swelling    Objective:      /81 (BP Location: Left arm, Patient Position: Sitting, BP Method: Large (Automatic))   Pulse 80   Resp 19   Ht 5' 10" (1.778 m)   Wt 102.8 kg (226 lb 11.2 oz)   SpO2 96%   BMI 32.53 kg/m²   RRR, CTAB, s/nt/nd, no c/c/e, non-toxic appearing, no focal weakness  Assessment:       1. Type 2 diabetes mellitus without complication, without long-term current use of insulin    2. Essential hypertension        Plan:       Type 2 diabetes mellitus without complication, without long-term current use of insulin  -     Lipid Panel; Future; Expected date: 08/16/2022  -     CBC Auto Differential; Future; Expected date: 08/16/2022  -     Comprehensive Metabolic Panel; Future; Expected date: 08/16/2022  -     TSH; Future; Expected date: 08/16/2022  -     Microalbumin/Creatinine Ratio, Urine; Future; Expected date: 08/16/2022  -     Hemoglobin A1C; Future; Expected date: 08/16/2022    Essential hypertension            Reviewed, monitored, evaluated, and assessed chronic conditions as outlined above  Continue current medicines, any changes noted above  As shown  Well controlled  Labs, radiology studies, and procedures/notes from the last 3 months were reviewed.    Risks, benefits, and side effects were discussed with the patient. All questions were answered to the fullest satisfaction of the patient, and pt verbalized understanding and agreement to treatment plan. Pt was to call with any new or worsening symptoms, or present to the ER.    "

## 2022-05-30 ENCOUNTER — HOSPITAL ENCOUNTER (EMERGENCY)
Facility: HOSPITAL | Age: 52
Discharge: HOME | End: 2022-05-30
Attending: EMERGENCY MEDICINE
Payer: COMMERCIAL

## 2022-05-30 VITALS
HEIGHT: 70 IN | TEMPERATURE: 99 F | WEIGHT: 217 LBS | OXYGEN SATURATION: 97 % | HEART RATE: 96 BPM | BODY MASS INDEX: 31.07 KG/M2 | RESPIRATION RATE: 18 BRPM | DIASTOLIC BLOOD PRESSURE: 94 MMHG | SYSTOLIC BLOOD PRESSURE: 150 MMHG

## 2022-05-30 DIAGNOSIS — M25.579 ANKLE PAIN: ICD-10-CM

## 2022-05-30 DIAGNOSIS — M77.9 TENDINITIS: ICD-10-CM

## 2022-05-30 DIAGNOSIS — S86.011A RUPTURE OF RIGHT ACHILLES TENDON, INITIAL ENCOUNTER: Primary | ICD-10-CM

## 2022-05-30 PROCEDURE — 73610 X-RAY EXAM OF ANKLE: CPT | Mod: TC,FY,RT

## 2022-05-30 PROCEDURE — 99283 EMERGENCY DEPT VISIT LOW MDM: CPT | Mod: 25

## 2022-05-30 PROCEDURE — 73610 X-RAY EXAM OF ANKLE: CPT | Mod: 26,RT,, | Performed by: RADIOLOGY

## 2022-05-30 PROCEDURE — 73610 XR ANKLE COMPLETE 3 VIEW RIGHT: ICD-10-PCS | Mod: 26,RT,, | Performed by: RADIOLOGY

## 2022-05-30 PROCEDURE — 29515 APPLICATION SHORT LEG SPLINT: CPT | Mod: RT

## 2022-05-30 PROCEDURE — 25000003 PHARM REV CODE 250: Performed by: NURSE PRACTITIONER

## 2022-05-30 RX ORDER — ACETAMINOPHEN 500 MG
1000 TABLET ORAL
Status: COMPLETED | OUTPATIENT
Start: 2022-05-30 | End: 2022-05-30

## 2022-05-30 RX ADMIN — ACETAMINOPHEN 1000 MG: 500 TABLET ORAL at 08:05

## 2022-05-30 NOTE — Clinical Note
"Umberto Cook"Maldonado was seen and treated in our emergency department on 5/30/2022.  He may return to work on 06/06/2022.  No weight-bearing or returning to work until cleared by Orthopedics.     If you have any questions or concerns, please don't hesitate to call.      Eduar Levi MD"

## 2022-05-31 ENCOUNTER — TELEPHONE (OUTPATIENT)
Dept: ORTHOPEDICS | Facility: CLINIC | Age: 52
End: 2022-05-31
Payer: COMMERCIAL

## 2022-05-31 ENCOUNTER — OFFICE VISIT (OUTPATIENT)
Dept: ORTHOPEDICS | Facility: CLINIC | Age: 52
End: 2022-05-31
Payer: COMMERCIAL

## 2022-05-31 VITALS — BODY MASS INDEX: 31.06 KG/M2 | RESPIRATION RATE: 16 BRPM | WEIGHT: 216.94 LBS | HEIGHT: 70 IN

## 2022-05-31 DIAGNOSIS — M79.671 PAIN OF RIGHT HEEL: ICD-10-CM

## 2022-05-31 DIAGNOSIS — Z01.818 PREOP TESTING: ICD-10-CM

## 2022-05-31 DIAGNOSIS — S86.011A RUPTURE OF RIGHT ACHILLES TENDON, INITIAL ENCOUNTER: Primary | ICD-10-CM

## 2022-05-31 PROCEDURE — 3051F HG A1C>EQUAL 7.0%<8.0%: CPT | Mod: CPTII,S$GLB,, | Performed by: ORTHOPAEDIC SURGERY

## 2022-05-31 PROCEDURE — 1159F PR MEDICATION LIST DOCUMENTED IN MEDICAL RECORD: ICD-10-PCS | Mod: CPTII,S$GLB,, | Performed by: ORTHOPAEDIC SURGERY

## 2022-05-31 PROCEDURE — 3008F PR BODY MASS INDEX (BMI) DOCUMENTED: ICD-10-PCS | Mod: CPTII,S$GLB,, | Performed by: ORTHOPAEDIC SURGERY

## 2022-05-31 PROCEDURE — 99214 OFFICE O/P EST MOD 30 MIN: CPT | Mod: S$GLB,,, | Performed by: ORTHOPAEDIC SURGERY

## 2022-05-31 PROCEDURE — 3008F BODY MASS INDEX DOCD: CPT | Mod: CPTII,S$GLB,, | Performed by: ORTHOPAEDIC SURGERY

## 2022-05-31 PROCEDURE — 3051F PR MOST RECENT HEMOGLOBIN A1C LEVEL 7.0 - < 8.0%: ICD-10-PCS | Mod: CPTII,S$GLB,, | Performed by: ORTHOPAEDIC SURGERY

## 2022-05-31 PROCEDURE — 99999 PR PBB SHADOW E&M-EST. PATIENT-LVL III: ICD-10-PCS | Mod: PBBFAC,,, | Performed by: ORTHOPAEDIC SURGERY

## 2022-05-31 PROCEDURE — 3061F PR NEG MICROALBUMINURIA RESULT DOCUMENTED/REVIEW: ICD-10-PCS | Mod: CPTII,S$GLB,, | Performed by: ORTHOPAEDIC SURGERY

## 2022-05-31 PROCEDURE — 4010F ACE/ARB THERAPY RXD/TAKEN: CPT | Mod: CPTII,S$GLB,, | Performed by: ORTHOPAEDIC SURGERY

## 2022-05-31 PROCEDURE — 4010F PR ACE/ARB THEARPY RXD/TAKEN: ICD-10-PCS | Mod: CPTII,S$GLB,, | Performed by: ORTHOPAEDIC SURGERY

## 2022-05-31 PROCEDURE — 3066F PR DOCUMENTATION OF TREATMENT FOR NEPHROPATHY: ICD-10-PCS | Mod: CPTII,S$GLB,, | Performed by: ORTHOPAEDIC SURGERY

## 2022-05-31 PROCEDURE — 99214 PR OFFICE/OUTPT VISIT, EST, LEVL IV, 30-39 MIN: ICD-10-PCS | Mod: S$GLB,,, | Performed by: ORTHOPAEDIC SURGERY

## 2022-05-31 PROCEDURE — 99999 PR PBB SHADOW E&M-EST. PATIENT-LVL III: CPT | Mod: PBBFAC,,, | Performed by: ORTHOPAEDIC SURGERY

## 2022-05-31 PROCEDURE — 3066F NEPHROPATHY DOC TX: CPT | Mod: CPTII,S$GLB,, | Performed by: ORTHOPAEDIC SURGERY

## 2022-05-31 PROCEDURE — 3061F NEG MICROALBUMINURIA REV: CPT | Mod: CPTII,S$GLB,, | Performed by: ORTHOPAEDIC SURGERY

## 2022-05-31 PROCEDURE — 1159F MED LIST DOCD IN RCRD: CPT | Mod: CPTII,S$GLB,, | Performed by: ORTHOPAEDIC SURGERY

## 2022-05-31 RX ORDER — CEPHALEXIN 500 MG/1
500 CAPSULE ORAL EVERY 6 HOURS
Qty: 20 CAPSULE | Refills: 0 | Status: SHIPPED | OUTPATIENT
Start: 2022-05-31 | End: 2022-08-31

## 2022-05-31 NOTE — TELEPHONE ENCOUNTER
Returned patient's call. Patient is coming in to the [facility] today, 5/31/22, at 1:15 P.M. Patient was agreeable to appointment.    ----- Message from Arleth Childers MA sent at 5/31/2022  7:51 AM CDT -----  Contact: Pt  Achillies tendon rupture  Right   Needs appt [facility]   Call  back

## 2022-05-31 NOTE — PROGRESS NOTES
Chief Complaint:  Achilles rupture right heel     Referring Provider: Maicol Alba Md  [address] [facility] [place] [address]  [place] [address]     HPI:    Mr. Okeefe is a 52-year-old male who returns today with new complaints of 1 day of right heel issues which began on 05/30/2022 when he was playing basketball with his son and he went to take off and felt a pop in his right heel.  He was seen the emergency room on his date of injury and after a palpable gap was felt at his Achilles tendon in his right heel he was placed in a splint.  He stated, it really does not hurt I inked got no control over it.  He cannot force of lead plantar flexes foot but can not dorsiflex it.  He denied new onset numbness and tingling in his feet he does have a history of peripheral neuropathy..          Past Medical History:   Diagnosis Date    Diabetes mellitus, type 2      Hypertension      Neuropathy       *  *  * Restless leg syndrome  Hyperlipidemia  Seasonal allergies   GERD              Past Surgical History:   Procedure Laterality Date    KNEE SURGERY right knee arthroscopy                   Review of patient's allergies indicates:   Allergen Reactions    Topamax [topiramate]         Mental agitation          Social History            Occupational History     technician   Tobacco Use    Smoking status: Former Smoker       Packs/day: 1.00       Years: 15.00       Pack years: 15.00       Types: Cigarettes       Quit date: 5/19/2010       Years since quitting: 10.3    Smokeless tobacco: Never Used   Substance and Sexual Activity    Alcohol use: No       Frequency: Never       Drinks per session: Patient refused       Binge frequency: Never    Drug use: No    Sexual activity: Yes       Partners: Female            Family History   Problem Relation Age of Onset    Kidney disease Mother      Kidney disease Maternal Uncle           Previous Hospitalizations:  Denied previous hospitalizations.      ROS:  New  diagnosis/surgery/prescriptions since last office visit on 04/06/2022:  Achilles tendon rupture, right heel.  Constitution: Negative for chills and fever.   HENT: Negative for congestion.    Eyes: Negative for blurred vision.   Respiratory: Negative for cough.    Endocrine: Negative for polydipsia.   Hematologic/Lymphatic: Negative for adenopathy.   Skin: Negative for flushing, itching and rash.   Musculoskeletal: Positive for joint pain and joint swelling. Negative for gout.   Gastrointestinal: Positive for heartburn. Negative for constipation and diarrhea.   Genitourinary: Negative for nocturia.   Neurological: Negative for headaches and seizures.   Psychiatric/Behavioral: Negative for depression and substance abuse. The patient is not nervous/anxious.    Allergic/Immunologic: Positive for environmental allergies.             Objective:   Physical Exam:   General: AAOx3.  No acute distress  HEENT: Normocephalic, PEARLA EOMI, Good Dentition  Neck: Supple, No JVD  Chest: Symetric, equal excursion on inspiration  Abdomen: Soft NTND  Vascular:  Pulses intact and equal bilaterally.  Capillary refill less than 3 seconds and equal bilaterally  Neurologic:  Pinprick and soft touch intact and equal bilaterally  Integment:   no ecchymosis or erythema  Extremity:   Ankle/foot:  Right foot/ankle in splint.  Splint in good condition.  With splint removed:  Dorsiflexion intact bilaterally in equal at 25°.  Active plantar flexion against resistance absent right ankle.  Palpable gap felt Achilles tendon at the insertion on the calcaneus.  Mild tenderness with palpation of the gap.  Good motion the patient's toes.  Mild swelling of the patient's right ankle.  Radiography:  Personally reviewed   X-rays of the right ankle completed on 05/30/2022 showed a superiorly displaced calcaneal heel spur when compared to x-rays from 07/25/2019.      Assessment:       Impression:       1. Achilles tendon rupture, right heel   2. Right heel  pain              Plan:       1.  Discussed physical examination and radiographic findings with the patient. Umberto understands that he has in Achilles tendon rupture of his right heel..  Treatment alternatives and outcomes were discussed with the patient he understands he can be treated conservatively with NSAIDs, activity modification, casting/splinting, physical therapy, injections, or he could consider surgical intervention Achilles tendon repair..   Discussed in detail with the patient he could be treated with serial casting versus surgical intervention but typically with casting there is remarkable strength reduction verses Achilles repair.  He also understands he will always have some minor weakness in his right leg if he proceeds with surgery or cast immobilization.  He stated he would like to proceed with surgery.  2. Possible complications of surgery to include bleeding, infection, scarring, nerve/blood vessel/tendon damage, need for further surgery, failed surgery, failure to improve, possible persistent pain, possible stiffness, possible skin slough, possible recurrence, and possible amputation were discussed with the patient.  The patient was permitted to ask questions and all concerns were addressed to his satisfaction.    3. Consent for Achilles tendon repair, right heel.  4. Tentatively schedule surgery for 06/09/2022.  5. Cam walker right lower extremity, 1 was fitted to the patient.  6. MRI right ankle complete the workup for preoperative planning  7. Norco 7.5/325, 1 p.o. q.4-6 hours p.r.n. pain, dispense 30, refill 0 the patient understands these are for postoperative use but if he needs to use them prior to surgery more can be given at a later date.  A prescription was submitted to his pharmacy.  8. Keflex 500 mg, 1 p.o q.i.d., dispense 20, refill 0.  The patient understands these are for postop use a prescription was submitted to his pharmacy.  9. May walk ambulate with weight-bearing at  tolerance to the right lower extremity.  10. Follow up approximately 10-12 days postoperatively.

## 2022-05-31 NOTE — ED PROVIDER NOTES
Encounter Date: 2022       History     Chief Complaint   Patient presents with    Ankle Pain     Pt reports playing basketball and feeling a pop and pain to the right achilles area.      52-year-old male presents with tenderness over the right Achilles tendon area.  He states his pain started about 1 hour prior to arrival when he jumped and a sprint while playing basketball.  He felt a sharp pain that felt like a ripping rubber band to the back of his foot.  He does have a past medical history of diabetes, hypertension, neuropathy, and restless leg syndrome.        Review of patient's allergies indicates:   Allergen Reactions    Topamax [topiramate]      Mental agitation     Montelukast Nausea And Vomiting     Past Medical History:   Diagnosis Date    Diabetes mellitus, type 2     Hypertension     Neuropathy     Restless leg syndrome      Past Surgical History:   Procedure Laterality Date    KNEE SURGERY       Family History   Problem Relation Age of Onset    Kidney disease Mother     Arthritis Mother     Hearing loss Mother     Kidney disease Maternal Uncle      Social History     Tobacco Use    Smoking status: Former Smoker     Packs/day: 1.00     Years: 15.00     Pack years: 15.00     Types: Cigarettes     Quit date: 2010     Years since quittin.0    Smokeless tobacco: Never Used   Substance Use Topics    Alcohol use: No    Drug use: No     Review of Systems   Musculoskeletal: Positive for arthralgias.   All other systems reviewed and are negative.      Physical Exam     Initial Vitals [22 1920]   BP Pulse Resp Temp SpO2   (!) 150/94 96 18 99.2 °F (37.3 °C) 97 %      MAP       --         Physical Exam    Constitutional: He appears well-developed and well-nourished. He is not diaphoretic. No distress.   HENT:   Head: Normocephalic and atraumatic.   Eyes: EOM are normal. Pupils are equal, round, and reactive to light. Right eye exhibits no discharge. Left eye exhibits no  discharge. No scleral icterus.   Neck:   Normal range of motion.  Cardiovascular: Normal rate and regular rhythm. Exam reveals no gallop and no friction rub.    No murmur heard.  Pulmonary/Chest: Breath sounds normal. No respiratory distress. He has no wheezes. He has no rhonchi. He has no rales.   Abdominal: He exhibits no distension.   Musculoskeletal:      Cervical back: Normal range of motion.      Comments: Full range of motion to right ankle.  Tenderness noted over the right Achilles tendon.  Tenderness over the posterior right calf.  Positive Collazo test.     Neurological: He is alert and oriented to person, place, and time.   Skin: Skin is warm and dry. Capillary refill takes less than 2 seconds.   Psychiatric: He has a normal mood and affect.         ED Course   Procedures  Labs Reviewed - No data to display       Imaging Results          X-Ray Ankle Complete Right (In process)                  Medications   acetaminophen tablet 1,000 mg (1,000 mg Oral Given 5/30/22 2016)     Medical Decision Making:   Differential Diagnosis:   Tendinitis, ankle sprain, ankle fracture, Achilles tendon rupture  ED Management:  52-year-old male presents with pain and tenderness to the right calf and tenderness over the Achilles tendon.  He states that he was sprinting and heard and felt a pop to his right Achilles tendon just prior to arrival.  He has a positive Collazo test.  I am concerned about possible Achilles tendon rupture.  X-ray reviewed by myself and Dr. Levi concerning for avulsion fracture versus possible ruptured Achilles tendon.  I will place him in a short-leg splint.  I have referred him to Orthopedics urgently.  I have advised him to rest, ice, elevate his extremity.  He was given Tylenol here in the emergency department.  He states he is not a lot of pain.  He was instructed to return to emergency room for any worsening concerns or symptoms.  He verbalized understanding of plan of care.                       Clinical Impression:   Final diagnoses:  [M25.579] Ankle pain  [S86.011A] Rupture of right Achilles tendon, initial encounter (Primary)  [M77.9] Tendinitis          ED Disposition Condition    Discharge Stable        ED Prescriptions     None        Follow-up Information     Follow up With Specialties Details Why Contact Info    [facility] Emergency Dept Emergency Medicine  As needed, If symptoms worsen [address] [id number] [phone]    Andrew Lawson, DO Orthopedic Surgery Schedule an appointment as soon as possible for a visit in 1 day  [address] [facility] [address]  [phone]             Carmela Huber NP  05/30/22 2053

## 2022-05-31 NOTE — LETTER
May 31, 2022    Umberto Okeefe  [address]  [place] [address]         [facility]  [address]  [place] [address]  Phone: [phone]  Fax: [phone] May 31, 2022     Patient: Umberto Okeefe   YOB: 1970   Date of Visit: 5/31/2022       To Whom It May Concern:    It is my medical opinion that Umberto Okeefe shall be on seated work ONLY.    If you have any questions or concerns, please don't hesitate to call.    Sincerely,        Maggi Lawson, DO

## 2022-05-31 NOTE — H&P (VIEW-ONLY)
Chief Complaint:  Achilles rupture right heel     HPI:    Mr. Okeefe is a 52-year-old male who returns today with new complaints of 1 day of right heel issues which began on 05/30/2022 when he was playing basketball with his son and he went to take off and felt a pop in his right heel.  He was seen the emergency room on his date of injury and after a palpable gap was felt at his Achilles tendon in his right heel he was placed in a splint.  He stated, it really does not hurt I inked got no control over it.  He cannot force of lead plantar flexes foot but can not dorsiflex it.  He denied new onset numbness and tingling in his feet he does have a history of peripheral neuropathy..          Past Medical History:   Diagnosis Date    Diabetes mellitus, type 2      Hypertension      Neuropathy       *  *  * Restless leg syndrome  Hyperlipidemia  Seasonal allergies   GERD              Past Surgical History:   Procedure Laterality Date    KNEE SURGERY right knee arthroscopy                   Review of patient's allergies indicates:   Allergen Reactions    Topamax [topiramate]         Mental agitation          Social History            Occupational History     technician   Tobacco Use    Smoking status: Former Smoker       Packs/day: 1.00       Years: 15.00       Pack years: 15.00       Types: Cigarettes       Quit date: 5/19/2010       Years since quitting: 10.3    Smokeless tobacco: Never Used   Substance and Sexual Activity    Alcohol use: No       Frequency: Never       Drinks per session: Patient refused       Binge frequency: Never    Drug use: No    Sexual activity: Yes       Partners: Female            Family History   Problem Relation Age of Onset    Kidney disease Mother      Kidney disease Maternal Uncle           Previous Hospitalizations:  Denied previous hospitalizations.      ROS:  New diagnosis/surgery/prescriptions since last office visit on 04/06/2022:  Achilles tendon rupture, right  heel.  Constitution: Negative for chills and fever.   HENT: Negative for congestion.    Eyes: Negative for blurred vision.   Respiratory: Negative for cough.    Endocrine: Negative for polydipsia.   Hematologic/Lymphatic: Negative for adenopathy.   Skin: Negative for flushing, itching and rash.   Musculoskeletal: Positive for joint pain and joint swelling. Negative for gout.   Gastrointestinal: Positive for heartburn. Negative for constipation and diarrhea.   Genitourinary: Negative for nocturia.   Neurological: Negative for headaches and seizures.   Psychiatric/Behavioral: Negative for depression and substance abuse. The patient is not nervous/anxious.    Allergic/Immunologic: Positive for environmental allergies.             Objective:   Physical Exam:   General: AAOx3.  No acute distress  HEENT: Normocephalic, PEARLA EOMI, Good Dentition  Neck: Supple, No JVD  Chest: Symetric, equal excursion on inspiration  Abdomen: Soft NTND  Vascular:  Pulses intact and equal bilaterally.  Capillary refill less than 3 seconds and equal bilaterally  Neurologic:  Pinprick and soft touch intact and equal bilaterally  Integment:   no ecchymosis or erythema  Extremity:   Ankle/foot:  Right foot/ankle in splint.  Splint in good condition.  With splint removed:  Dorsiflexion intact bilaterally in equal at 25°.  Active plantar flexion against resistance absent right ankle.  Palpable gap felt Achilles tendon at the insertion on the calcaneus.  Mild tenderness with palpation of the gap.  Good motion the patient's toes.  Mild swelling of the patient's right ankle.  Radiography:  Personally reviewed   X-rays of the right ankle completed on 05/30/2022 showed a superiorly displaced calcaneal heel spur when compared to x-rays from 07/25/2019.      Assessment:       Impression:       1. Achilles tendon rupture, right heel   2. Right heel pain              Plan:       1.  Discussed physical examination and radiographic findings with the  patient. Umberto understands that he has in Achilles tendon rupture of his right heel..  Treatment alternatives and outcomes were discussed with the patient he understands he can be treated conservatively with NSAIDs, activity modification, casting/splinting, physical therapy, injections, or he could consider surgical intervention Achilles tendon repair..   Discussed in detail with the patient he could be treated with serial casting versus surgical intervention but typically with casting there is remarkable strength reduction verses Achilles repair.  He also understands he will always have some minor weakness in his right leg if he proceeds with surgery or cast immobilization.  He stated he would like to proceed with surgery.  2. Possible complications of surgery to include bleeding, infection, scarring, nerve/blood vessel/tendon damage, need for further surgery, failed surgery, failure to improve, possible persistent pain, possible stiffness, possible skin slough, possible recurrence, and possible amputation were discussed with the patient.  The patient was permitted to ask questions and all concerns were addressed to his satisfaction.    3. Consent for Achilles tendon repair, right heel.  4. Tentatively schedule surgery for 06/09/2022.  5. Cam walker right lower extremity, 1 was fitted to the patient.  6. MRI right ankle complete the workup for preoperative planning  7. Norco 7.5/325, 1 p.o. q.4-6 hours p.r.n. pain, dispense 30, refill 0 the patient understands these are for postoperative use but if he needs to use them prior to surgery more can be given at a later date.  A prescription was submitted to his pharmacy.  8. Keflex 500 mg, 1 p.o q.i.d., dispense 20, refill 0.  The patient understands these are for postop use a prescription was submitted to his pharmacy.  9. May walk ambulate with weight-bearing at tolerance to the right lower extremity.  10. Follow up approximately 10-12 days postoperatively.

## 2022-05-31 NOTE — DISCHARGE INSTRUCTIONS
Take Tylenol as needed for pain or discomfort.  Rest, elevate, ice right lower extremity.  No weight-bearing until seen by orthopedics.  Use crutches.  Keep splint in place until evaluated by orthopedics.  Follow-up with orthopedics as discussed soon as possible.  Return to the emergency room for any worsening symptoms or concerns.

## 2022-06-01 ENCOUNTER — HOSPITAL ENCOUNTER (OUTPATIENT)
Dept: RADIOLOGY | Facility: HOSPITAL | Age: 52
Discharge: HOME | End: 2022-06-01
Attending: ORTHOPAEDIC SURGERY
Payer: COMMERCIAL

## 2022-06-01 DIAGNOSIS — S86.011A RUPTURE OF RIGHT ACHILLES TENDON, INITIAL ENCOUNTER: ICD-10-CM

## 2022-06-01 DIAGNOSIS — S86.011A RUPTURE OF RIGHT ACHILLES TENDON, INITIAL ENCOUNTER: Primary | ICD-10-CM

## 2022-06-01 PROCEDURE — 73721 MRI JNT OF LWR EXTRE W/O DYE: CPT | Mod: TC,RT

## 2022-06-01 PROCEDURE — 73721 MRI JNT OF LWR EXTRE W/O DYE: CPT | Mod: 26,RT,, | Performed by: RADIOLOGY

## 2022-06-01 PROCEDURE — 73721 MRI ANKLE WITHOUT CONTRAST RIGHT: ICD-10-PCS | Mod: 26,RT,, | Performed by: RADIOLOGY

## 2022-06-02 ENCOUNTER — PATIENT MESSAGE (OUTPATIENT)
Dept: FAMILY MEDICINE | Facility: CLINIC | Age: 52
End: 2022-06-02
Payer: COMMERCIAL

## 2022-06-02 ENCOUNTER — TELEPHONE (OUTPATIENT)
Dept: ORTHOPEDICS | Facility: CLINIC | Age: 52
End: 2022-06-02
Payer: COMMERCIAL

## 2022-06-03 ENCOUNTER — ANESTHESIA EVENT (OUTPATIENT)
Dept: SURGERY | Facility: HOSPITAL | Age: 52
End: 2022-06-03
Payer: COMMERCIAL

## 2022-06-03 ENCOUNTER — HOSPITAL ENCOUNTER (OUTPATIENT)
Dept: PREADMISSION TESTING | Facility: HOSPITAL | Age: 52
Discharge: HOME | End: 2022-06-03
Attending: FAMILY MEDICINE
Payer: COMMERCIAL

## 2022-06-03 ENCOUNTER — PATIENT MESSAGE (OUTPATIENT)
Dept: SURGERY | Facility: HOSPITAL | Age: 52
End: 2022-06-03
Payer: COMMERCIAL

## 2022-06-03 PROCEDURE — 99900103 DSU ONLY-NO CHARGE-INITIAL HR (STAT)

## 2022-06-03 NOTE — ANESTHESIA PREPROCEDURE EVALUATION
06/03/2022  Umberto Okeefe is a 52 y.o., male.      Pre-op Assessment    I have reviewed the Patient Summary Reports.     I have reviewed the Nursing Notes.    I have reviewed the Medications.     Review of Systems  Social:  Former Smoker    Hematology/Oncology:  Hematology Normal        EENT/Dental:EENT/Dental Normal   Cardiovascular:   Hypertension    Pulmonary:  Pulmonary Normal    Hepatic/GI:  Hepatic/GI Normal    Neurological:   Peripheral Neuropathy    Endocrine:   Diabetes        Physical Exam    Airway:  Mallampati: II   Mouth Opening: Normal  TM Distance: Normal  Tongue: Normal  Neck ROM: Normal ROM    Chest/Lungs:  Clear to auscultation    Heart:  Rate: Normal  Rhythm: Regular Rhythm        Anesthesia Plan  Type of Anesthesia, risks & benefits discussed:    Anesthesia Type: Gen ETT  Intra-op Monitoring Plan: Standard ASA Monitors  Post Op Pain Control Plan: multimodal analgesia  Induction:  IV  Airway Plan: Direct  ASA Score: 3  Day of Surgery Review of History & Physical: H&P Update referred to the surgeon/provider.    Ready For Surgery From Anesthesia Perspective.     .

## 2022-06-06 ENCOUNTER — PATIENT MESSAGE (OUTPATIENT)
Dept: PODIATRY | Facility: CLINIC | Age: 52
End: 2022-06-06
Payer: COMMERCIAL

## 2022-06-07 ENCOUNTER — TELEPHONE (OUTPATIENT)
Dept: PODIATRY | Facility: CLINIC | Age: 52
End: 2022-06-07
Payer: COMMERCIAL

## 2022-06-07 NOTE — TELEPHONE ENCOUNTER
Patient letting us know he is having surgery 6/9.     Per patient     I tore my Achilles tendon on May 30 and will be having surgery June 9 Thursday with Dr. Lawson Just to make sure Im good for surgery

## 2022-06-09 ENCOUNTER — HOSPITAL ENCOUNTER (OUTPATIENT)
Facility: HOSPITAL | Age: 52
Discharge: HOME | End: 2022-06-09
Attending: ORTHOPAEDIC SURGERY | Admitting: ORTHOPAEDIC SURGERY
Payer: COMMERCIAL

## 2022-06-09 ENCOUNTER — ANESTHESIA (OUTPATIENT)
Dept: SURGERY | Facility: HOSPITAL | Age: 52
End: 2022-06-09
Payer: COMMERCIAL

## 2022-06-09 DIAGNOSIS — S86.011D RUPTURE OF RIGHT ACHILLES TENDON, SUBSEQUENT ENCOUNTER: Primary | ICD-10-CM

## 2022-06-09 DIAGNOSIS — Z01.818 PRE-OP EVALUATION: ICD-10-CM

## 2022-06-09 PROBLEM — S86.011A ACHILLES RUPTURE, RIGHT: Status: ACTIVE | Noted: 2022-06-09

## 2022-06-09 LAB — POCT GLUCOSE: 165 MG/DL (ref 70–110)

## 2022-06-09 PROCEDURE — 37000008 HC ANESTHESIA 1ST 15 MINUTES: Performed by: ORTHOPAEDIC SURGERY

## 2022-06-09 PROCEDURE — D9220A PRA ANESTHESIA: Mod: CRNA,,, | Performed by: NURSE ANESTHETIST, CERTIFIED REGISTERED

## 2022-06-09 PROCEDURE — 25000003 PHARM REV CODE 250: Performed by: ORTHOPAEDIC SURGERY

## 2022-06-09 PROCEDURE — 93005 ELECTROCARDIOGRAM TRACING: CPT

## 2022-06-09 PROCEDURE — 36000709 HC OR TIME LEV III EA ADD 15 MIN: Performed by: ORTHOPAEDIC SURGERY

## 2022-06-09 PROCEDURE — 63600175 PHARM REV CODE 636 W HCPCS: Performed by: ORTHOPAEDIC SURGERY

## 2022-06-09 PROCEDURE — 63600175 PHARM REV CODE 636 W HCPCS: Performed by: ANESTHESIOLOGY

## 2022-06-09 PROCEDURE — D9220A PRA ANESTHESIA: ICD-10-PCS | Mod: CRNA,,, | Performed by: NURSE ANESTHETIST, CERTIFIED REGISTERED

## 2022-06-09 PROCEDURE — 27650 PR REPAIR ACHILLES TENDON,PRIMARY: ICD-10-PCS | Mod: RT,,, | Performed by: ORTHOPAEDIC SURGERY

## 2022-06-09 PROCEDURE — 36000708 HC OR TIME LEV III 1ST 15 MIN: Performed by: ORTHOPAEDIC SURGERY

## 2022-06-09 PROCEDURE — C1713 ANCHOR/SCREW BN/BN,TIS/BN: HCPCS | Performed by: ORTHOPAEDIC SURGERY

## 2022-06-09 PROCEDURE — 93010 ELECTROCARDIOGRAM REPORT: CPT | Mod: ,,, | Performed by: INTERNAL MEDICINE

## 2022-06-09 PROCEDURE — 93010 EKG 12-LEAD: ICD-10-PCS | Mod: ,,, | Performed by: INTERNAL MEDICINE

## 2022-06-09 PROCEDURE — 37000009 HC ANESTHESIA EA ADD 15 MINS: Performed by: ORTHOPAEDIC SURGERY

## 2022-06-09 PROCEDURE — 71000015 HC POSTOP RECOV 1ST HR: Performed by: ORTHOPAEDIC SURGERY

## 2022-06-09 PROCEDURE — D9220A PRA ANESTHESIA: ICD-10-PCS | Mod: ANES,,, | Performed by: ANESTHESIOLOGY

## 2022-06-09 PROCEDURE — 71000033 HC RECOVERY, INTIAL HOUR: Performed by: ORTHOPAEDIC SURGERY

## 2022-06-09 PROCEDURE — 25000003 PHARM REV CODE 250: Performed by: NURSE ANESTHETIST, CERTIFIED REGISTERED

## 2022-06-09 PROCEDURE — 63600175 PHARM REV CODE 636 W HCPCS: Performed by: NURSE ANESTHETIST, CERTIFIED REGISTERED

## 2022-06-09 PROCEDURE — D9220A PRA ANESTHESIA: Mod: ANES,,, | Performed by: ANESTHESIOLOGY

## 2022-06-09 PROCEDURE — 27650 REPAIR ACHILLES TENDON: CPT | Mod: RT,,, | Performed by: ORTHOPAEDIC SURGERY

## 2022-06-09 DEVICE — ANCHOR HEALIX ADV BR 4.5: Type: IMPLANTABLE DEVICE | Site: ACHILLES TENDON | Status: FUNCTIONAL

## 2022-06-09 RX ORDER — PHENYLEPHRINE HYDROCHLORIDE 10 MG/ML
INJECTION INTRAVENOUS
Status: DISCONTINUED | OUTPATIENT
Start: 2022-06-09 | End: 2022-06-09

## 2022-06-09 RX ORDER — MEPERIDINE HYDROCHLORIDE 50 MG/ML
INJECTION INTRAMUSCULAR; INTRAVENOUS; SUBCUTANEOUS
Status: DISCONTINUED | OUTPATIENT
Start: 2022-06-09 | End: 2022-06-09

## 2022-06-09 RX ORDER — ONDANSETRON 2 MG/ML
INJECTION INTRAMUSCULAR; INTRAVENOUS
Status: DISCONTINUED | OUTPATIENT
Start: 2022-06-09 | End: 2022-06-09

## 2022-06-09 RX ORDER — EPHEDRINE SULFATE 50 MG/ML
INJECTION, SOLUTION INTRAVENOUS
Status: DISCONTINUED | OUTPATIENT
Start: 2022-06-09 | End: 2022-06-09

## 2022-06-09 RX ORDER — SODIUM CHLORIDE, SODIUM LACTATE, POTASSIUM CHLORIDE, CALCIUM CHLORIDE 600; 310; 30; 20 MG/100ML; MG/100ML; MG/100ML; MG/100ML
INJECTION, SOLUTION INTRAVENOUS CONTINUOUS
Status: DISCONTINUED | OUTPATIENT
Start: 2022-06-09 | End: 2022-06-09 | Stop reason: HOSPADM

## 2022-06-09 RX ORDER — LIDOCAINE HYDROCHLORIDE 20 MG/ML
INJECTION, SOLUTION EPIDURAL; INFILTRATION; INTRACAUDAL; PERINEURAL
Status: DISCONTINUED | OUTPATIENT
Start: 2022-06-09 | End: 2022-06-09

## 2022-06-09 RX ORDER — DIPHENHYDRAMINE HYDROCHLORIDE 50 MG/ML
12.5 INJECTION INTRAMUSCULAR; INTRAVENOUS
Status: DISCONTINUED | OUTPATIENT
Start: 2022-06-09 | End: 2022-06-09 | Stop reason: HOSPADM

## 2022-06-09 RX ORDER — SUCCINYLCHOLINE CHLORIDE 20 MG/ML
INJECTION INTRAMUSCULAR; INTRAVENOUS
Status: DISCONTINUED | OUTPATIENT
Start: 2022-06-09 | End: 2022-06-09

## 2022-06-09 RX ORDER — ONDANSETRON 2 MG/ML
4 INJECTION INTRAMUSCULAR; INTRAVENOUS DAILY PRN
Status: DISCONTINUED | OUTPATIENT
Start: 2022-06-09 | End: 2022-06-09 | Stop reason: HOSPADM

## 2022-06-09 RX ORDER — LIDOCAINE HYDROCHLORIDE 10 MG/ML
1 INJECTION, SOLUTION EPIDURAL; INFILTRATION; INTRACAUDAL; PERINEURAL ONCE
Status: DISCONTINUED | OUTPATIENT
Start: 2022-06-09 | End: 2022-06-09 | Stop reason: HOSPADM

## 2022-06-09 RX ORDER — MIDAZOLAM HYDROCHLORIDE 1 MG/ML
INJECTION INTRAMUSCULAR; INTRAVENOUS
Status: DISCONTINUED | OUTPATIENT
Start: 2022-06-09 | End: 2022-06-09

## 2022-06-09 RX ORDER — CEFAZOLIN SODIUM 2 G/50ML
2 SOLUTION INTRAVENOUS ONCE
Status: COMPLETED | OUTPATIENT
Start: 2022-06-09 | End: 2022-06-09

## 2022-06-09 RX ORDER — MORPHINE SULFATE 4 MG/ML
2 INJECTION, SOLUTION INTRAMUSCULAR; INTRAVENOUS EVERY 5 MIN PRN
Status: DISCONTINUED | OUTPATIENT
Start: 2022-06-09 | End: 2022-06-09 | Stop reason: HOSPADM

## 2022-06-09 RX ORDER — SODIUM CHLORIDE, SODIUM LACTATE, POTASSIUM CHLORIDE, CALCIUM CHLORIDE 600; 310; 30; 20 MG/100ML; MG/100ML; MG/100ML; MG/100ML
125 INJECTION, SOLUTION INTRAVENOUS CONTINUOUS
Status: DISCONTINUED | OUTPATIENT
Start: 2022-06-09 | End: 2022-06-09 | Stop reason: HOSPADM

## 2022-06-09 RX ORDER — PROPOFOL 10 MG/ML
VIAL (ML) INTRAVENOUS
Status: DISCONTINUED | OUTPATIENT
Start: 2022-06-09 | End: 2022-06-09

## 2022-06-09 RX ORDER — BUPIVACAINE HYDROCHLORIDE 2.5 MG/ML
INJECTION, SOLUTION EPIDURAL; INFILTRATION; INTRACAUDAL
Status: DISCONTINUED | OUTPATIENT
Start: 2022-06-09 | End: 2022-06-09 | Stop reason: HOSPADM

## 2022-06-09 RX ADMIN — PHENYLEPHRINE HYDROCHLORIDE 100 MCG: 10 INJECTION INTRAVENOUS at 11:06

## 2022-06-09 RX ADMIN — PHENYLEPHRINE HYDROCHLORIDE 100 MCG: 10 INJECTION INTRAVENOUS at 12:06

## 2022-06-09 RX ADMIN — SODIUM CHLORIDE, POTASSIUM CHLORIDE, SODIUM LACTATE AND CALCIUM CHLORIDE: 600; 310; 30; 20 INJECTION, SOLUTION INTRAVENOUS at 11:06

## 2022-06-09 RX ADMIN — EPHEDRINE SULFATE 15 MG: 50 INJECTION INTRAVENOUS at 11:06

## 2022-06-09 RX ADMIN — PHENYLEPHRINE HYDROCHLORIDE 50 MCG: 10 INJECTION INTRAVENOUS at 11:06

## 2022-06-09 RX ADMIN — LIDOCAINE HYDROCHLORIDE 80 MG: 20 INJECTION, SOLUTION EPIDURAL; INFILTRATION; INTRACAUDAL; PERINEURAL at 10:06

## 2022-06-09 RX ADMIN — SODIUM CHLORIDE, POTASSIUM CHLORIDE, SODIUM LACTATE AND CALCIUM CHLORIDE: 600; 310; 30; 20 INJECTION, SOLUTION INTRAVENOUS at 09:06

## 2022-06-09 RX ADMIN — EPHEDRINE SULFATE 20 MG: 50 INJECTION INTRAVENOUS at 11:06

## 2022-06-09 RX ADMIN — MIDAZOLAM HYDROCHLORIDE 2 MG: 1 INJECTION, SOLUTION INTRAMUSCULAR; INTRAVENOUS at 10:06

## 2022-06-09 RX ADMIN — MEPERIDINE HYDROCHLORIDE 50 MG: 50 INJECTION INTRAMUSCULAR; INTRAVENOUS; SUBCUTANEOUS at 10:06

## 2022-06-09 RX ADMIN — ONDANSETRON 4 MG: 2 INJECTION INTRAMUSCULAR; INTRAVENOUS at 10:06

## 2022-06-09 RX ADMIN — SUCCINYLCHOLINE CHLORIDE 100 MG: 20 INJECTION, SOLUTION INTRAMUSCULAR; INTRAVENOUS at 10:06

## 2022-06-09 RX ADMIN — PROPOFOL 200 MG: 10 INJECTION, EMULSION INTRAVENOUS at 10:06

## 2022-06-09 RX ADMIN — CEFAZOLIN SODIUM 2 G: 2 SOLUTION INTRAVENOUS at 10:06

## 2022-06-09 NOTE — OP NOTE
[facility]  Orthopedic Surgery    6/9/2022    Umberto Okeefe  [id number]      PREOPERATIVE DIAGNOSIS: Rupture of right Achilles tendon, initial encounter [S86.011A]    POSTOPERATIVE DIAGNOSIS:  Achilles tendon rupture, right heel.    PROCEDURE:  1. Achilles tendon repair, right heel, with 2 Mitek Helix suture anchors.  2. Short-leg posterior mold plaster splint, right lower extremity      SURGEON: Andrew Lawson D.O.    ASSISTANT: Orton Grinnell, CFA.     ANESTHESIA:  General.    BLOOD LOSS:  Less than 10 cc.     TOURNIQUET:  55 minutes    DRAINS:  None.    PATHOLOGY:  Debrided necrotic Achilles tendon    COMPLICATION:.  None.    INDICATIONS FOR PROCEDURE:   Mr. Okeefe is a 52-year-old male who has right heel issues which began on 05/30/2022 when he was playing basketball with his son and he went to take off and felt a pop in his right heel.  He was seen the emergency room on his date of injury and after a palpable gap was felt at his Achilles tendon in his right heel he was placed in a splint.  He stated, it really does not hurt I just got no control over it.  He cannot plantar flex his foot against resistance.  He denied new onset numbness and tingling in his feet he does have a history of peripheral neuropathy..   He elected to proceed with surgery after complications to include bleeding, infection, scarring, nerve/blood vessel/tendon damage, need for further surgery, failed surgery, failure to improve, stiffness, skin slough, weakness, and possible recurrence were discussed.  He signed a consent.    PROCEDURE IN DETAIL:  The patient was brought to the operating room and was maintained on the preop holding bed where general anesthesia was administered by the Anesthesiology Department.  After general anesthesia was administered the patient was placed on the operating room table and rolled into the prone position.  All bony prominences were well padded.  A tourniquet was then applied to the  upper part of the patient's right lower extremity.  The patient's right leg was then prepped with chlorhexidine solution and draped in the normal sterile fashion.  After prepping and draping bony and soft tissue landmarks were palpated and a longitude incision site was drawn at the posterior aspect of the patient's heel running proximally.  The patient's leg was then elevated, exsanguinated, tourniquet was inflated.       Sharp incision was then made with a #15 blade followed by dissection to the level of the paratenon.  The patient was found to have a ruptured avulsed Achilles tendon.  The paratenon was opened exposing the Achilles tendon rupture.  The Achilles tendon was then debrided of necrotic tendon.  After debridement was copiously irrigated.  There was the coronal split of the tendon with avulsion of the dorsal portion off the calcaneus.  FiberWire suture was placed in the anterior aspect of the tendon rupture and this was reapproximated to itself while holding the patient's foot in a plantarflexed position.  After repair of the anterior aspect of the Achilles tendon drill holes were placed in the calcaneus and then 2 suture anchors were placed.  The dorsal for coronal segment was then laid over the anterior portion and the suture from the suture anchor was placed through the dorsal and anterior segments and run proximally and back distally and then sewn together.  Similar repair was done with the 2nd suture anchor.  After repair was completed the patient's ankle was put through full motion and no gapping of the tendon was noted.  The incision was then copiously irrigated.  The tourniquet was released and full hemostasis was ensured.       The paratenon was then reapproximated to itself with Vicryl suture followed by layered closure of the incision with Vicryl suture and final closure with nylon suture in a horizontal mattress type of fashion.  The incision was then dressed with Adaptic, sterile gauze,  sterile cast padding, posterior mold plaster splint and an Ace wrap maintaining the patient's ankle in a equinus position.  He was then rotated back into the supine position and was awakened by anesthesia and transferred from the operating room to the recovery room in stable condition.  He tolerated the procedures well without complication.

## 2022-06-09 NOTE — TRANSFER OF CARE
"Anesthesia Transfer of Care Note    Patient: Umberto Okeefe    Procedure(s) Performed: Procedure(s) (LRB):  REPAIR, TENDON, ACHILLES (Right)    Patient location: PACU    Anesthesia Type: general    Transport from OR: Transported from OR on room air with adequate spontaneous ventilation    Post pain: adequate analgesia    Post assessment: no apparent anesthetic complications and tolerated procedure well    Post vital signs: stable    Level of consciousness: awake, alert and oriented    Nausea/Vomiting: no nausea/vomiting    Complications: none    Transfer of care protocol was followed      Last vitals:   Visit Vitals  BP (!) 145/91   Pulse 85   Temp 37 °C (98.6 °F)   Resp 10   Ht 5' 10" (1.778 m)   Wt 98 kg (216 lb)   SpO2 99%   BMI 30.99 kg/m²     "

## 2022-06-09 NOTE — ANESTHESIA POSTPROCEDURE EVALUATION
Anesthesia Post Evaluation    Patient: Umberto Okeefe    Procedure(s) Performed: Procedure(s) (LRB):  REPAIR, TENDON, ACHILLES (Right)    Final Anesthesia Type: general      Patient location during evaluation: PACU  Patient participation: Yes- Able to Participate  Level of consciousness: awake and alert  Post-procedure vital signs: reviewed and stable  Pain management: adequate  Airway patency: patent    PONV status at discharge: No PONV  Anesthetic complications: no      Cardiovascular status: blood pressure returned to baseline  Respiratory status: unassisted  Hydration status: euvolemic  Follow-up not needed.          Vitals Value Taken Time   /83 06/09/22 1325   Temp 36.3 °C (97.3 °F) 06/09/22 1239   Pulse 95 06/09/22 1325   Resp 20 06/09/22 1325   SpO2 95 % 06/09/22 1329   Vitals shown include unvalidated device data.      No case tracking events are documented in the log.      Pain/Edi Score: Edi Score: 10 (6/9/2022  1:25 PM)

## 2022-06-09 NOTE — DISCHARGE SUMMARY
[facility]  Discharge Note  Short Stay    Procedure(s) (LRB):  REPAIR, TENDON, ACHILLES (Right)    OUTCOME: Patient tolerated treatment/procedure well without complication and is now ready for discharge.    DISPOSITION: Home or Self Care    FINAL DIAGNOSIS:  Achilles rupture, right heel    FOLLOWUP: In clinic    DISCHARGE INSTRUCTIONS:    Discharge Procedure Orders   Diet Adult Regular     Keep surgical extremity elevated     Ice to affected area     Other restrictions (specify):   Order Comments: 1. Elevate and ice right foot.  2. Do not remove dressing, keep dressing clean and dry.  3. Walk with crutches nonweightbearing to the right lower extremity.     Notify your health care provider if you experience any of the following:  temperature >100.4     Leave dressing on - Keep it clean, dry, and intact until clinic visit   Order Comments: Do not remove dressing, keep dressing clean and dry.     Weight bearing restrictions (specify):   Order Comments: Which is nonweightbearing to the right lower extremity.        TIME SPENT ON DISCHARGE: 10 minutes

## 2022-06-13 VITALS
WEIGHT: 216 LBS | DIASTOLIC BLOOD PRESSURE: 82 MMHG | BODY MASS INDEX: 30.92 KG/M2 | TEMPERATURE: 97 F | HEART RATE: 85 BPM | RESPIRATION RATE: 20 BRPM | HEIGHT: 70 IN | OXYGEN SATURATION: 95 % | SYSTOLIC BLOOD PRESSURE: 127 MMHG

## 2022-06-13 LAB — POCT GLUCOSE: 150 MG/DL (ref 70–110)

## 2022-06-21 ENCOUNTER — OFFICE VISIT (OUTPATIENT)
Dept: ORTHOPEDICS | Facility: CLINIC | Age: 52
End: 2022-06-21
Payer: COMMERCIAL

## 2022-06-21 ENCOUNTER — PATIENT MESSAGE (OUTPATIENT)
Dept: ORTHOPEDICS | Facility: CLINIC | Age: 52
End: 2022-06-21

## 2022-06-21 VITALS — WEIGHT: 216.06 LBS | BODY MASS INDEX: 30.93 KG/M2 | RESPIRATION RATE: 18 BRPM | HEIGHT: 70 IN

## 2022-06-21 DIAGNOSIS — Z98.890 S/P ACHILLES TENDON REPAIR: Primary | ICD-10-CM

## 2022-06-21 DIAGNOSIS — Z48.02 ENCOUNTER FOR REMOVAL OF SUTURES: ICD-10-CM

## 2022-06-21 PROCEDURE — 4010F PR ACE/ARB THEARPY RXD/TAKEN: ICD-10-PCS | Mod: CPTII,S$GLB,, | Performed by: ORTHOPAEDIC SURGERY

## 2022-06-21 PROCEDURE — 3066F PR DOCUMENTATION OF TREATMENT FOR NEPHROPATHY: ICD-10-PCS | Mod: CPTII,S$GLB,, | Performed by: ORTHOPAEDIC SURGERY

## 2022-06-21 PROCEDURE — 4010F ACE/ARB THERAPY RXD/TAKEN: CPT | Mod: CPTII,S$GLB,, | Performed by: ORTHOPAEDIC SURGERY

## 2022-06-21 PROCEDURE — 99999 PR PBB SHADOW E&M-EST. PATIENT-LVL III: CPT | Mod: PBBFAC,,, | Performed by: ORTHOPAEDIC SURGERY

## 2022-06-21 PROCEDURE — 3061F NEG MICROALBUMINURIA REV: CPT | Mod: CPTII,S$GLB,, | Performed by: ORTHOPAEDIC SURGERY

## 2022-06-21 PROCEDURE — 3066F NEPHROPATHY DOC TX: CPT | Mod: CPTII,S$GLB,, | Performed by: ORTHOPAEDIC SURGERY

## 2022-06-21 PROCEDURE — 99999 PR PBB SHADOW E&M-EST. PATIENT-LVL III: ICD-10-PCS | Mod: PBBFAC,,, | Performed by: ORTHOPAEDIC SURGERY

## 2022-06-21 PROCEDURE — 3008F PR BODY MASS INDEX (BMI) DOCUMENTED: ICD-10-PCS | Mod: CPTII,S$GLB,, | Performed by: ORTHOPAEDIC SURGERY

## 2022-06-21 PROCEDURE — 99024 POSTOP FOLLOW-UP VISIT: CPT | Mod: S$GLB,,, | Performed by: ORTHOPAEDIC SURGERY

## 2022-06-21 PROCEDURE — 3051F HG A1C>EQUAL 7.0%<8.0%: CPT | Mod: CPTII,S$GLB,, | Performed by: ORTHOPAEDIC SURGERY

## 2022-06-21 PROCEDURE — 3061F PR NEG MICROALBUMINURIA RESULT DOCUMENTED/REVIEW: ICD-10-PCS | Mod: CPTII,S$GLB,, | Performed by: ORTHOPAEDIC SURGERY

## 2022-06-21 PROCEDURE — 3008F BODY MASS INDEX DOCD: CPT | Mod: CPTII,S$GLB,, | Performed by: ORTHOPAEDIC SURGERY

## 2022-06-21 PROCEDURE — 99024 SUTURE REMOVAL: ICD-10-PCS | Mod: S$GLB,,, | Performed by: ORTHOPAEDIC SURGERY

## 2022-06-21 PROCEDURE — 1159F MED LIST DOCD IN RCRD: CPT | Mod: CPTII,S$GLB,, | Performed by: ORTHOPAEDIC SURGERY

## 2022-06-21 PROCEDURE — 3051F PR MOST RECENT HEMOGLOBIN A1C LEVEL 7.0 - < 8.0%: ICD-10-PCS | Mod: CPTII,S$GLB,, | Performed by: ORTHOPAEDIC SURGERY

## 2022-06-21 PROCEDURE — 1159F PR MEDICATION LIST DOCUMENTED IN MEDICAL RECORD: ICD-10-PCS | Mod: CPTII,S$GLB,, | Performed by: ORTHOPAEDIC SURGERY

## 2022-06-21 NOTE — PROGRESS NOTES
Subjective:      Patient ID: Umberto Okeefe is a 52 y.o. male.    Chief Complaint: Post-op Evaluation of the Right Ankle      HPI:  Mr. Okeefe returns today for his 1st postop visit on Achilles were tendon repair of his right heel.  His date of surgery 06/09/2022.  He stated he is doing well and his pain is well controlled.  He injured his right Achilles on 05/30/2022 when he was playing basketball with his son and he went to take off and felt a pop in his right heel.     ROS:  New diagnosis/surgery/prescriptions since last office visit on 05/31/2022:  Achilles tendon repair, right heel  Constitution: Negative for chills and fever.   HENT: Negative for congestion.    Eyes: Negative for blurred vision.   Respiratory: Negative for cough.    Endocrine: Negative for polydipsia.   Hematologic/Lymphatic: Negative for adenopathy.   Skin: Negative for flushing, itching and rash.   Musculoskeletal: Positive for joint pain and joint swelling. Negative for gout.   Gastrointestinal: Positive for heartburn. Negative for constipation and diarrhea.   Genitourinary: Negative for nocturia.   Neurological: Negative for headaches and seizures.   Psychiatric/Behavioral: Negative for depression and substance abuse. The patient is not nervous/anxious.    Allergic/Immunologic: Positive for environmental allergies.       Objective:      Physical Exam:   General: AAOx3.  No acute distress  Vascular:  Pulses intact and equal bilaterally.  Capillary refill less than 3 seconds and equal bilaterally  Neurologic:  Pinprick and soft touch intact and equal bilaterally  Integment:  Incision well approximated with sutures in place.  Extremity:  Ankle/foot:  Active dorsiflexion/plantar flexion intact within allowable.  Achilles palpable throughout full distribution no gaps.  No swelling.  Nontender with palpation.  Nontender with motion.  Calf soft.  Homans negative.  Radiography:  No new x-rays done today.      Assessment:       Impression:      1. S/P Achilles tendon repair, right heel             Plan:       1.  Discussed physical examination with the patient. Umberto understands that he had an Achilles tendon repair of his right heel.  2. Remove sutures and place Steri-Strips.  3. Place in a well-molded short-leg fiberglass cast in gravity equinus.  4. The patient understands he is to bring his cam walker with him to the next visit for application.  5. A prescription for the patient to obtain a 1/2 in heel lift for his shoe was given to him.  He is to bring the heel lift with him at next visit.  6. Cast shoe right foot 1 was fitted to the patient.  7. Continue to ambulate with crutches nonweightbearing to the right lower extremity.  8. Any pain can be treated with over-the-counter medications dosed per box instructions.  9. Follow up in 1 month.

## 2022-07-08 ENCOUNTER — PATIENT MESSAGE (OUTPATIENT)
Dept: FAMILY MEDICINE | Facility: CLINIC | Age: 52
End: 2022-07-08
Payer: COMMERCIAL

## 2022-07-12 ENCOUNTER — TELEPHONE (OUTPATIENT)
Dept: PODIATRY | Facility: CLINIC | Age: 52
End: 2022-07-12
Payer: COMMERCIAL

## 2022-07-12 NOTE — TELEPHONE ENCOUNTER
Patient requesting how to get heel lifts. Patient advised to bring boot to office if he is unable to find any. Patient verbalized understanding.

## 2022-07-12 NOTE — TELEPHONE ENCOUNTER
----- Message from Antwon Greenwood sent at 7/12/2022  2:32 PM CDT -----  Type: Needs Medical Advice  Who Called:  pt  Best Call Back Number: [phone] (home)     Additional Information: pt need to the nurse--please advise--thank you

## 2022-07-19 ENCOUNTER — HOSPITAL ENCOUNTER (OUTPATIENT)
Dept: RADIOLOGY | Facility: HOSPITAL | Age: 52
Discharge: HOME | End: 2022-07-19
Attending: ORTHOPAEDIC SURGERY
Payer: COMMERCIAL

## 2022-07-19 ENCOUNTER — OFFICE VISIT (OUTPATIENT)
Dept: ORTHOPEDICS | Facility: CLINIC | Age: 52
End: 2022-07-19
Payer: COMMERCIAL

## 2022-07-19 DIAGNOSIS — Z98.890 S/P ACHILLES TENDON REPAIR: ICD-10-CM

## 2022-07-19 DIAGNOSIS — Z46.89 ENCOUNTER FOR CAST REMOVAL: ICD-10-CM

## 2022-07-19 DIAGNOSIS — Z98.890 S/P ACHILLES TENDON REPAIR: Primary | ICD-10-CM

## 2022-07-19 PROCEDURE — 99024 PR POST-OP FOLLOW-UP VISIT: ICD-10-PCS | Mod: S$GLB,,, | Performed by: ORTHOPAEDIC SURGERY

## 2022-07-19 PROCEDURE — 73610 XR ANKLE COMPLETE 3 VIEW RIGHT: ICD-10-PCS | Mod: 26,RT,, | Performed by: RADIOLOGY

## 2022-07-19 PROCEDURE — 99999 PR PBB SHADOW E&M-EST. PATIENT-LVL III: CPT | Mod: PBBFAC,,, | Performed by: ORTHOPAEDIC SURGERY

## 2022-07-19 PROCEDURE — 3061F PR NEG MICROALBUMINURIA RESULT DOCUMENTED/REVIEW: ICD-10-PCS | Mod: CPTII,S$GLB,, | Performed by: ORTHOPAEDIC SURGERY

## 2022-07-19 PROCEDURE — 73610 X-RAY EXAM OF ANKLE: CPT | Mod: 26,RT,, | Performed by: RADIOLOGY

## 2022-07-19 PROCEDURE — 73610 X-RAY EXAM OF ANKLE: CPT | Mod: TC,PN,RT

## 2022-07-19 PROCEDURE — 4010F PR ACE/ARB THEARPY RXD/TAKEN: ICD-10-PCS | Mod: CPTII,S$GLB,, | Performed by: ORTHOPAEDIC SURGERY

## 2022-07-19 PROCEDURE — 3051F HG A1C>EQUAL 7.0%<8.0%: CPT | Mod: CPTII,S$GLB,, | Performed by: ORTHOPAEDIC SURGERY

## 2022-07-19 PROCEDURE — 3051F PR MOST RECENT HEMOGLOBIN A1C LEVEL 7.0 - < 8.0%: ICD-10-PCS | Mod: CPTII,S$GLB,, | Performed by: ORTHOPAEDIC SURGERY

## 2022-07-19 PROCEDURE — 3061F NEG MICROALBUMINURIA REV: CPT | Mod: CPTII,S$GLB,, | Performed by: ORTHOPAEDIC SURGERY

## 2022-07-19 PROCEDURE — 3066F NEPHROPATHY DOC TX: CPT | Mod: CPTII,S$GLB,, | Performed by: ORTHOPAEDIC SURGERY

## 2022-07-19 PROCEDURE — 99999 PR PBB SHADOW E&M-EST. PATIENT-LVL III: ICD-10-PCS | Mod: PBBFAC,,, | Performed by: ORTHOPAEDIC SURGERY

## 2022-07-19 PROCEDURE — 3066F PR DOCUMENTATION OF TREATMENT FOR NEPHROPATHY: ICD-10-PCS | Mod: CPTII,S$GLB,, | Performed by: ORTHOPAEDIC SURGERY

## 2022-07-19 PROCEDURE — 1159F MED LIST DOCD IN RCRD: CPT | Mod: CPTII,S$GLB,, | Performed by: ORTHOPAEDIC SURGERY

## 2022-07-19 PROCEDURE — 4010F ACE/ARB THERAPY RXD/TAKEN: CPT | Mod: CPTII,S$GLB,, | Performed by: ORTHOPAEDIC SURGERY

## 2022-07-19 PROCEDURE — 99024 POSTOP FOLLOW-UP VISIT: CPT | Mod: S$GLB,,, | Performed by: ORTHOPAEDIC SURGERY

## 2022-07-19 PROCEDURE — 1159F PR MEDICATION LIST DOCUMENTED IN MEDICAL RECORD: ICD-10-PCS | Mod: CPTII,S$GLB,, | Performed by: ORTHOPAEDIC SURGERY

## 2022-07-19 NOTE — PROGRESS NOTES
Patient ID: Umberto Okeefe is a 52 y.o. male.     Chief Complaint: Post-op Evaluation of the Right Ankle        HPI:  Mr. Okeefe returns today for a 6 week follow-up visit on Achilles tendon repair of his right heel.  His date of surgery 06/09/2022.  He stated he is doing well and his pain is well controlled. He was placed in a cast as last visit.  He injured his right Achilles on 05/30/2022 when he was playing basketball with his son and he went to take off and felt a pop in his right heel.      ROS:  No new diagnosis/surgery/prescriptions since last office visit on 06/21/2021  Constitution: Negative for chills and fever.   HENT: Negative for congestion.    Eyes: Negative for blurred vision.   Respiratory: Negative for cough.    Endocrine: Negative for polydipsia.   Hematologic/Lymphatic: Negative for adenopathy.   Skin: Negative for flushing, itching and rash.   Musculoskeletal: Positive for joint pain and joint swelling. Negative for gout.   Gastrointestinal: Positive for heartburn. Negative for constipation and diarrhea.   Genitourinary: Negative for nocturia.   Neurological: Negative for headaches and seizures.   Psychiatric/Behavioral: Negative for depression and substance abuse. The patient is not nervous/anxious.    Allergic/Immunologic: Positive for environmental allergies.       Objective:   Physical Exam:   General: AAOx3.  No acute distress  Vascular:  Pulses intact and equal bilaterally.  Capillary refill less than 3 seconds and equal bilaterally  Neurologic:  Pinprick and soft touch intact and equal bilaterally  Integment:  Incision well approximated and well healed.  Extremity:  Ankle/foot:  Cast in good condition, with cast removed:  Active dorsiflexion/plantar flexion intact within allowable.  Achilles palpable throughout full distribution no gaps.  No swelling.  Nontender with palpation.  Nontender with motion.  Calf soft.  Homans negative.  Radiography:   Personally reviewed x-rays of the  right ankle completed on 07/19/2022 which showed a small and the fight at the posterior heel and some mild arthritic changes.      Assessment:       Impression:      1. S/P Achilles tendon repair, right heel                Plan:       1.  Discussed physical examination and radiographic evaluation with the patient. Umberto understands that he appears to be doing well at this point he can come out of the cast and start using a CAM walker.  2.   Place in a Cam Walker with a heel lift. Patient understands he must wear the Cam walker at all times and treat it like a cast and only remove it for hygiene for the next 2 weeks then he can take it off to sleep.   3. Any pain can be treated with over-the-counter medications dosed per box instructions.  4. Refer to physical therapy to start in Achilles repair rehab program.    5. In 1 week start a progressive weight-bearing program with 25% weight and increase by 25% each week to on 100%.  Patient understands he must have his cam walker on at all times when bearing weight and also must be using 2 crutches until he is at 100% weight-bearing.  If he has a sudden increase in pain he is to stop bearing weight and call the office.  6. Work restrictions include seated work only no activities requiring the patient to ambulate or stand no activities on ladders/ scaffolding/ stairs.    7.Follow up in 6 weeks expect to return the patient to full activities.

## 2022-07-19 NOTE — LETTER
July 19, 2022      [facility]  [address]  [place] [address]  Phone: [phone]  Fax: [phone]       Patient: Umberto Okeefe   YOB: 1970  Date of Visit: 07/19/2022    To Whom It May Concern:    Ayde Okeefe  was at [facility] on 07/19/2022. The patient may return to work/school on 7/20/22 with restrictions. Work restrictions include seated work only no activities requiring the patient to ambulate or stand no activities on ladders/ scaffolding/ stairs. If you have any questions or concerns, or if I can be of further assistance, please do not hesitate to contact me.    Sincerely,            Cristal Munroe LPN

## 2022-07-21 ENCOUNTER — CLINICAL SUPPORT (OUTPATIENT)
Dept: REHABILITATION | Facility: HOSPITAL | Age: 52
End: 2022-07-21
Attending: ORTHOPAEDIC SURGERY
Payer: COMMERCIAL

## 2022-07-21 DIAGNOSIS — S86.011A RUPTURE OF RIGHT ACHILLES TENDON, INITIAL ENCOUNTER: Primary | ICD-10-CM

## 2022-07-21 DIAGNOSIS — Z98.890 S/P ACHILLES TENDON REPAIR: ICD-10-CM

## 2022-07-21 PROCEDURE — 97110 THERAPEUTIC EXERCISES: CPT

## 2022-07-21 PROCEDURE — 97162 PT EVAL MOD COMPLEX 30 MIN: CPT

## 2022-07-21 NOTE — PLAN OF CARE
"  Physical Therapy Initial Evaluation     Name: Umberto Okeefe  [facility] Number: [id number]    Diagnosis:   Encounter Diagnoses   Name Primary?    S/P Achilles tendon repair     Rupture of right Achilles tendon, initial encounter Yes     Physician: Andrew Lawson,   Treatment Orders: PT Eval and Treat  Past Medical History:   Diagnosis Date    Diabetes mellitus, type 2     GERD (gastroesophageal reflux disease)     Hypertension     Neuropathy     Restless leg syndrome      Current Outpatient Medications   Medication Sig    atorvastatin (LIPITOR) 40 MG tablet Take 1 tablet by mouth once daily    cephALEXin (KEFLEX) 500 MG capsule Take 1 capsule (500 mg total) by mouth every 6 (six) hours.    clotrimazole (LOTRIMIN) 1 % Soln Apply topically 2 (two) times daily.    diclofenac sodium (VOLTAREN) 1 % Gel Apply 2 g topically 3 (three) times daily.    gabapentin (NEURONTIN) 800 MG tablet Take 1 tablet (800 mg total) by mouth 3 (three) times daily.    insulin detemir U-100 (LEVEMIR FLEXTOUCH) 100 unit/mL (3 mL) SubQ InPn pen Inject 47 Units into the skin every evening.    lisinopriL 10 MG tablet Take 1 tablet (10 mg total) by mouth once daily.    pen needle, diabetic 31 gauge x 5/16" Ndle Use as directed BID and PRN. Dx: E11.9    RELION PEN NEEDLES 32 gauge x 5/32" Ndle USE AS DIRECTED    tadalafiL (CIALIS) 10 MG tablet Take 1 tablet (10 mg total) by mouth daily as needed for Erectile Dysfunction.    THIN LANCETS 26 gauge Misc     TRUE METRIX GLUCOSE METER Misc     TRUE METRIX GLUCOSE TEST STRIP Strp Use as directed to check blood glucose twice daily and PRN. Dx E11.9.     No current facility-administered medications for this visit.     Review of patient's allergies indicates:   Allergen Reactions    Topamax [topiramate]      Mental agitation     Montelukast Nausea And Vomiting     Precautions: Fall    Subjective     Patient states:  Achilles tendon repair of his right heel.  His date of surgery " 06/09/2022.  He stated he is doing well and his pain is well controlled. He was placed in a cast as last visit.  He injured his right Achilles on 05/30/2022 when he was playing basketball with his son and he went to take off and felt a pop in his right heel.   Pain Scale: Umberto rates pain on a scale of 0-10 to be 1 at worst; 1 currently; 1 at best .  Onset: sudden  Radicular symptoms:  none  Aggravating factors:   Standing   Easing factors:  Rest, cam walker   Prior Therapy: none  Home Environment (Steps/Adaptations):  , split level home with 15 steps ,has elevator   Functional Deficits Leading to Referral: difficulty walking   Prior functional status: ind with work and adls   DME owned/used: [employer]   Occupation:                          Pts goals:  Return to work     Objective          Observation: healthy appearing male with difficulty waling     Range of Motion: Ankle    Left Right   Dorsiflexion wfl 0   Plantarflexion WFL 70   Inversion WFL WFL   Eversion WFL WFL      Strength: Ankle       Left Right   Gastrocnemius 5/5 3-/5   Soleus 5/5 3-/5   Dorsiflexion 5/5 3/5   Inversion 5/5 3/5   Eversion 5/5 3/5       Strength: Knee   Left Right   Quadriceps 5/5 5/5   Hamstrings 5/5 5/5     Strength: Hip    Left Right   Iliopsoas 5/5 5/5   PGM 5/5 5/5   IR 5/5 5/5   ER 5/5 5/5   Ext 5/5 5/5       Joint Mobility: restricted   Palpation: TTP to posterior ankle   Sensation: limited due to diabetes neuropathy     Edema:  Left: absent  Right: absent    Gait: Okeefe ambulated 0 feet with auxillary crutches.  Level of Assistance: min A  Patient displays unsteady gait, decreased step length, increased base of support, decreased base of support and antalgic gait.   Balance: Maintains SLS NA seconds with NA balance strategies.    Treatment     Time In: 930  Time Out: 1050    PT Evaluation Completed? Yes  Discussed Plan of Care with patient: Yes    Umberto received 15 minutes of therapeutic  exercises.  Gentle rom R ankle  DF YTB x 30  x2  INV/EV YTB x 30 x 2  Gentle PF with manual resistance    Umberto received 0 minutes of gait training.    Written Home Exercises Provided:   Umberto demo good understanding of the education provided. Patient demo good return demo of skill of exercises.      Assessment     Patient demonstrates good compliance with restrictions and exercises   Patient prognosis is Excellent.  Pt will benefit from skilled outpatient physical therapy to address the above stated deficits, provide pt/family education and to maximize pt's level of independence.     Medical necessity is demonstrated by the following IMPAIRMENTS/PROBLEMS:  1. Increased Pain  2. Decreased Joint Mobility & Decreased ROM  3. Decreased strength  4. Decreased Flexibility BLE  5. Decreased Tolerance to Functional Activities  6. LEFS 23.7 0-80  LEFS Crosswalk (Ankle, Foot & Lower Leg)  CMS G-    Pt's spiritual, cultural and educational needs considered and pt agreeable to plan of care and goals as stated below:     Anticipated Barriers for physical therapy: weight bearing       Short Term GOALS: 4 weeks. Pt agrees with goals set.  1. Patient demonstrates independence with HEP.   2. Pt will have increased R ankle strength by 1/2 grade  3. Pt will have 0-5 deg DF  4.Pt will be ind with gait with crutches 50% weight bearing     Long Term GOALS: 8  weeks. Pt agrees with goals set.  1. Pt will have increased 1 muscle grade R ankle   2. Pt will have full rom R ankle   3. Pt will be ind with amb in community without assistive device     Functional Limitations Reports - G Codes  Category: mobility  Tool: LEFS  Score: 24/80   TEST SCORE Modifier Impairment Limitation Restriction   80/80 CH 0% impaired, limited or restricted   64-79/80 CI At least 1% but less than 20% impaired, limited or restricted   49-63/80 CJ At least 20% but less than 40% impaired, limited or restricted   33-48/80 CK At least 40% but less than 60% impaired,  limited or restricted   17-32/80 CL At least 60% but less than 80% impaired, limited or restricted   1-16/80 CM At least 80% but less than 100% impaired, limited or restricted   0/80 % impaired, limited or restricted         Plan     Outpatient physical therapy 2 times weekly to include: pt ed, hep, therapeutic exercises, neuromuscular re-education/ balance exercises, joint mobilizations, aquatic therapy and modalities prn. Cont PT for  8 weeks. Pt may be seen by PTA as part of the rehabilitation team.     Therapist: Michelle Patel, PT  7/21/2022

## 2022-08-01 ENCOUNTER — CLINICAL SUPPORT (OUTPATIENT)
Dept: REHABILITATION | Facility: HOSPITAL | Age: 52
End: 2022-08-01
Attending: ORTHOPAEDIC SURGERY
Payer: COMMERCIAL

## 2022-08-01 DIAGNOSIS — R26.2 DIFFICULTY WALKING: Primary | ICD-10-CM

## 2022-08-01 PROCEDURE — 97110 THERAPEUTIC EXERCISES: CPT

## 2022-08-01 NOTE — PROGRESS NOTES
Physical Therapy Daily Note     Name: Umberto Okeefe  Clinic Number: [id number]  Diagnosis: No diagnosis found.  Physician: Andrew Lawson, DO  Precautions: weight bearing   Visit #: 2 of 12  PTA Visit #: 0  Time In: 1225  Time Out: 100    Subjective     Pt reports: no complaints.  States he is 50% weight bearing with crutches and cam walker   Pain Scale: Umberto rates pain on a scale of 0-10 to be n/a currently.    Objective     Umberto received individual therapeutic exercises to develop strength, endurance, ROM and flexibility for 23 minutes including:  Nustep x 10  4 way hip SLR 3 x 15  Active ankle PF/DF  Towel scrunches   Gauze pick ups x 30    Umberto received the following manual therapy techniques: Myofacial release and Soft tissue Mobilization were applied to the: R ankle  for 8 minutes including:    Written Home Exercises Provided: yes  Pt demo good understanding of the education provided. Umberto demonstrated good return demonstration of activities.     Education provided re:  Umberto verbalized good understanding of education provided.   No spiritual or educational barriers to learning provided    Assessment     Patient tolerated ankle exercises with no adverse side effects   This is a 52 y.o. male referred to outpatient physical therapy and presents with a medical diagnosis of R achilles tendon repair  and demonstrates limitations as described in the problem list. Pt prognosis is Good. Pt will continue to benefit from skilled outpatient physical therapy to address the deficits listed in the problem list, provide pt/family education and to maximize pt's level of independence in the home and community environment.     Goals as follows:  Medical necessity is demonstrated by the following IMPAIRMENTS/PROBLEMS:  1. Increased Pain  2. Decreased Joint Mobility & Decreased ROM  3. Decreased strength  4. Decreased Flexibility BLE  5. Decreased Tolerance to  Functional Activities  6. LEFS 23.7 0-80  LEFS Crosswalk (Ankle, Foot & Lower Leg)  CMS G-     Pt's spiritual, cultural and educational needs considered and pt agreeable to plan of care and goals as stated below:      Anticipated Barriers for physical therapy: weight bearing         Short Term GOALS: 4 weeks. Pt agrees with goals set.  1. Patient demonstrates independence with HEP.   2. Pt will have increased R ankle strength by 1/2 grade  3. Pt will have 0-5 deg DF  4.Pt will be ind with gait with crutches 50% weight bearing      Long Term GOALS: 8  weeks. Pt agrees with goals set.  1. Pt will have increased 1 muscle grade R ankle   2. Pt will have full rom R ankle   3. Pt will be ind with amb in community without assistive device      Functional Limitations Reports - G Codes  Category: mobility  Tool: LEFS  Score: 24/80      Plan     Continue with established Plan of Care towards PT goals.    Therapist: Michelle Patel, PT  8/1/2022

## 2022-08-05 ENCOUNTER — CLINICAL SUPPORT (OUTPATIENT)
Dept: REHABILITATION | Facility: HOSPITAL | Age: 52
End: 2022-08-05
Payer: COMMERCIAL

## 2022-08-05 DIAGNOSIS — S86.011A RUPTURE OF RIGHT ACHILLES TENDON, INITIAL ENCOUNTER: Primary | ICD-10-CM

## 2022-08-05 PROCEDURE — 97110 THERAPEUTIC EXERCISES: CPT

## 2022-08-05 PROCEDURE — 97140 MANUAL THERAPY 1/> REGIONS: CPT

## 2022-08-05 NOTE — PROGRESS NOTES
Physical Therapy Daily Note     Name: Umberto Okeefe  Clinic Number: [id number]  Diagnosis:   Encounter Diagnosis   Name Primary?    Rupture of right Achilles tendon, initial encounter Yes     Physician: Andrew Lawson,   Precautions: weight bearing   Visit #: 3 of 12  PTA Visit #: 0  Time In: 930  Time Out: 1015    Subjective     Pt reports: no complaints.  States he is 50% weight bearing with crutches and cam walker   Pain Scale: Umberto rates pain on a scale of 0-10 to be n/a currently.    Objective     Umberto received individual therapeutic exercises to develop strength, endurance, ROM and flexibility for 30 minutes including:  Nustep x 10  4 way hip SLR 3 x 15  Active ankle PF/DFx 50  Towel scrunches x 50  Gauze pick ups x 30  TKE x 50  Heel slides x 50  Active rom INV/EV x 50    Umberto received the following manual therapy techniques: Myofacial release and Soft tissue Mobilization were applied to the: R ankle  for 8 minutes including:    Written Home Exercises Provided: yes  Pt demo good understanding of the education provided. Umberto demonstrated good return demonstration of activities.     Education provided re:  Umberto verbalized good understanding of education provided.   No spiritual or educational barriers to learning provided    Assessment     Patient tolerated ankle exercises with no adverse side effects   This is a 52 y.o. male referred to outpatient physical therapy and presents with a medical diagnosis of R achilles tendon repair  and demonstrates limitations as described in the problem list. Pt prognosis is Good. Pt will continue to benefit from skilled outpatient physical therapy to address the deficits listed in the problem list, provide pt/family education and to maximize pt's level of independence in the home and community environment.     Goals as follows:  Medical necessity is demonstrated by the following IMPAIRMENTS/PROBLEMS:  1.  Increased Pain  2. Decreased Joint Mobility & Decreased ROM  3. Decreased strength  4. Decreased Flexibility BLE  5. Decreased Tolerance to Functional Activities  6. LEFS 23.7 0-80  LEFS Crosswalk (Ankle, Foot & Lower Leg)  CMS G-     Pt's spiritual, cultural and educational needs considered and pt agreeable to plan of care and goals as stated below:      Anticipated Barriers for physical therapy: weight bearing         Short Term GOALS: 4 weeks. Pt agrees with goals set.  1. Patient demonstrates independence with HEP.   2. Pt will have increased R ankle strength by 1/2 grade  3. Pt will have 0-5 deg DF  4.Pt will be ind with gait with crutches 50% weight bearing      Long Term GOALS: 8  weeks. Pt agrees with goals set.  1. Pt will have increased 1 muscle grade R ankle   2. Pt will have full rom R ankle   3. Pt will be ind with amb in community without assistive device      Functional Limitations Reports - G Codes  Category: mobility  Tool: LEFS  Score: 24/80      Plan     Continue with established Plan of Care towards PT goals.    Therapist: Michelle Patel, PT  8/5/2022

## 2022-08-09 ENCOUNTER — CLINICAL SUPPORT (OUTPATIENT)
Dept: REHABILITATION | Facility: HOSPITAL | Age: 52
End: 2022-08-09
Payer: COMMERCIAL

## 2022-08-09 DIAGNOSIS — S86.011A RUPTURE OF RIGHT ACHILLES TENDON, INITIAL ENCOUNTER: Primary | ICD-10-CM

## 2022-08-09 PROCEDURE — 97140 MANUAL THERAPY 1/> REGIONS: CPT

## 2022-08-09 PROCEDURE — 97110 THERAPEUTIC EXERCISES: CPT

## 2022-08-09 NOTE — PROGRESS NOTES
Physical Therapy Daily Note     Name: Umberto Okeefe  Clinic Number: [id number]  Diagnosis:   Encounter Diagnosis   Name Primary?    Rupture of right Achilles tendon, initial encounter Yes     Physician: Andrew Lawson,   Precautions: weight bearing   Visit #: 4 of 12  PTA Visit #: 0  Time In 155  Time Out: 235    Subjective     Pt reports: no complaints.  States he is 50% weight bearing with crutches and cam walker   Pain Scale: Umberto rates pain on a scale of 0-10 to be n/a currently.    Objective     Umberto received individual therapeutic exercises to develop strength, endurance, ROM and flexibility for 30 minutes including:  Nustep x 10  4 way hip SLR 3 x 15  Active ankle PF/DFx 50  Towel scrunches x 50  Gauze pick ups x 30  TKE x 50  Heel slides x 50  Active rom INV/EV x 50    Umberto received the following manual therapy techniques: Myofacial release and Soft tissue Mobilization were applied to the: R ankle  for 8 minutes including:    Written Home Exercises Provided: yes  Pt demo good understanding of the education provided. Umberto demonstrated good return demonstration of activities.     Education provided re:  Umberto verbalized good understanding of education provided.   No spiritual or educational barriers to learning provided    Assessment     Patient tolerated ankle exercises with no adverse side effects   This is a 52 y.o. male referred to outpatient physical therapy and presents with a medical diagnosis of R achilles tendon repair  and demonstrates limitations as described in the problem list. Pt prognosis is Good. Pt will continue to benefit from skilled outpatient physical therapy to address the deficits listed in the problem list, provide pt/family education and to maximize pt's level of independence in the home and community environment.     Goals as follows:  Medical necessity is demonstrated by the following IMPAIRMENTS/PROBLEMS:  1. Increased  Pain  2. Decreased Joint Mobility & Decreased ROM  3. Decreased strength  4. Decreased Flexibility BLE  5. Decreased Tolerance to Functional Activities  6. LEFS 23.7 0-80  LEFS Crosswalk (Ankle, Foot & Lower Leg)  CMS G-     Pt's spiritual, cultural and educational needs considered and pt agreeable to plan of care and goals as stated below:      Anticipated Barriers for physical therapy: weight bearing         Short Term GOALS: 4 weeks. Pt agrees with goals set.  1. Patient demonstrates independence with HEP.   2. Pt will have increased R ankle strength by 1/2 grade  3. Pt will have 0-5 deg DF  4.Pt will be ind with gait with crutches 50% weight bearing      Long Term GOALS: 8  weeks. Pt agrees with goals set.  1. Pt will have increased 1 muscle grade R ankle   2. Pt will have full rom R ankle   3. Pt will be ind with amb in community without assistive device      Functional Limitations Reports - G Codes  Category: mobility  Tool: LEFS  Score: 24/80      Plan     Continue with established Plan of Care towards PT goals.    Therapist: Michelle Patel, PT  8/9/2022

## 2022-08-11 ENCOUNTER — CLINICAL SUPPORT (OUTPATIENT)
Dept: REHABILITATION | Facility: HOSPITAL | Age: 52
End: 2022-08-11
Payer: COMMERCIAL

## 2022-08-11 DIAGNOSIS — S86.011A RUPTURE OF RIGHT ACHILLES TENDON, INITIAL ENCOUNTER: Primary | ICD-10-CM

## 2022-08-11 PROCEDURE — 97110 THERAPEUTIC EXERCISES: CPT

## 2022-08-11 PROCEDURE — 97140 MANUAL THERAPY 1/> REGIONS: CPT

## 2022-08-11 NOTE — PROGRESS NOTES
Physical Therapy Daily Note     Name: Umberto Okeefe  Clinic Number: [id number]  Diagnosis:   Encounter Diagnosis   Name Primary?    Rupture of right Achilles tendon, initial encounter Yes     Physician: Andrew Lawson,   Precautions: weight bearing   Visit #: 5 of 12  PTA Visit #: 0  Time In 150  Time Out: 220    Subjective     Pt reports: no complaints.  States he is 75% weight bearing with crutches and cam walker   Pain Scale: Umberto rates pain on a scale of 0-10 to be n/a currently.    Objective     Umberto received individual therapeutic exercises to develop strength, endurance, ROM and flexibility for 20 minutes including:  Nustep x 10  4 way hip SLR 3 x 15  Active ankle PF/DFx 50  Towel scrunches x 50  Gauze pick ups x 30  TKE x 50  Heel slides x 50  Active rom INV/EV x 50    Umberto received the following manual therapy techniques: Myofacial release and Soft tissue Mobilization were applied to the: R ankle  for 8 minutes including:    Written Home Exercises Provided: yes  Pt demo good understanding of the education provided. Umberto demonstrated good return demonstration of activities.     Education provided re:  Umberto verbalized good understanding of education provided.   No spiritual or educational barriers to learning provided    Assessment     Patient tolerated ankle exercises with no adverse side effects   This is a 52 y.o. male referred to outpatient physical therapy and presents with a medical diagnosis of R achilles tendon repair  and demonstrates limitations as described in the problem list. Pt prognosis is Good. Pt will continue to benefit from skilled outpatient physical therapy to address the deficits listed in the problem list, provide pt/family education and to maximize pt's level of independence in the home and community environment.     Goals as follows:  Medical necessity is demonstrated by the following IMPAIRMENTS/PROBLEMS:  1. Increased  Pain  2. Decreased Joint Mobility & Decreased ROM  3. Decreased strength  4. Decreased Flexibility BLE  5. Decreased Tolerance to Functional Activities  6. LEFS 23.7 0-80  LEFS Crosswalk (Ankle, Foot & Lower Leg)  CMS G-     Pt's spiritual, cultural and educational needs considered and pt agreeable to plan of care and goals as stated below:      Anticipated Barriers for physical therapy: weight bearing         Short Term GOALS: 4 weeks. Pt agrees with goals set.  1. Patient demonstrates independence with HEP.   2. Pt will have increased R ankle strength by 1/2 grade  3. Pt will have 0-5 deg DF  4.Pt will be ind with gait with crutches 50% weight bearing      Long Term GOALS: 8  weeks. Pt agrees with goals set.  1. Pt will have increased 1 muscle grade R ankle   2. Pt will have full rom R ankle   3. Pt will be ind with amb in community without assistive device      Functional Limitations Reports - G Codes  Category: mobility  Tool: LEFS  Score: 24/80      Plan     Continue with established Plan of Care towards PT goals.    Therapist: Michelle Patel, PT  8/11/2022

## 2022-08-15 ENCOUNTER — LAB VISIT (OUTPATIENT)
Dept: LAB | Facility: HOSPITAL | Age: 52
End: 2022-08-15
Attending: FAMILY MEDICINE
Payer: COMMERCIAL

## 2022-08-15 DIAGNOSIS — E11.9 TYPE 2 DIABETES MELLITUS WITHOUT COMPLICATION, WITHOUT LONG-TERM CURRENT USE OF INSULIN: ICD-10-CM

## 2022-08-15 LAB
ALBUMIN SERPL BCP-MCNC: 4.2 G/DL (ref 3.5–5.2)
ALBUMIN/CREAT UR: 21.6 UG/MG (ref 0–30)
ALP SERPL-CCNC: 94 U/L (ref 55–135)
ALT SERPL W/O P-5'-P-CCNC: 35 U/L (ref 10–44)
ANION GAP SERPL CALC-SCNC: 12 MMOL/L (ref 8–16)
AST SERPL-CCNC: 20 U/L (ref 10–40)
BASOPHILS # BLD AUTO: 0.06 K/UL (ref 0–0.2)
BASOPHILS NFR BLD: 0.9 % (ref 0–1.9)
BILIRUB SERPL-MCNC: 0.6 MG/DL (ref 0.1–1)
BUN SERPL-MCNC: 15 MG/DL (ref 6–20)
CALCIUM SERPL-MCNC: 9.5 MG/DL (ref 8.7–10.5)
CHLORIDE SERPL-SCNC: 99 MMOL/L (ref 95–110)
CHOLEST SERPL-MCNC: 173 MG/DL (ref 120–199)
CHOLEST/HDLC SERPL: 5.4 {RATIO} (ref 2–5)
CO2 SERPL-SCNC: 25 MMOL/L (ref 23–29)
CREAT SERPL-MCNC: 1.3 MG/DL (ref 0.5–1.4)
CREAT UR-MCNC: 111 MG/DL (ref 23–375)
DIFFERENTIAL METHOD: ABNORMAL
EOSINOPHIL # BLD AUTO: 0.2 K/UL (ref 0–0.5)
EOSINOPHIL NFR BLD: 3.6 % (ref 0–8)
ERYTHROCYTE [DISTWIDTH] IN BLOOD BY AUTOMATED COUNT: 11.7 % (ref 11.5–14.5)
EST. GFR  (NO RACE VARIABLE): >60 ML/MIN/1.73 M^2
ESTIMATED AVG GLUCOSE: 214 MG/DL (ref 68–131)
GLUCOSE SERPL-MCNC: 187 MG/DL (ref 70–110)
HBA1C MFR BLD: 9.1 % (ref 4–5.6)
HCT VFR BLD AUTO: 47.1 % (ref 40–54)
HDLC SERPL-MCNC: 32 MG/DL (ref 40–75)
HDLC SERPL: 18.5 % (ref 20–50)
HGB BLD-MCNC: 16.4 G/DL (ref 14–18)
IMM GRANULOCYTES # BLD AUTO: 0.02 K/UL (ref 0–0.04)
IMM GRANULOCYTES NFR BLD AUTO: 0.3 % (ref 0–0.5)
LDLC SERPL CALC-MCNC: 80.8 MG/DL (ref 63–159)
LYMPHOCYTES # BLD AUTO: 2 K/UL (ref 1–4.8)
LYMPHOCYTES NFR BLD: 29.4 % (ref 18–48)
MCH RBC QN AUTO: 29.8 PG (ref 27–31)
MCHC RBC AUTO-ENTMCNC: 34.8 G/DL (ref 32–36)
MCV RBC AUTO: 86 FL (ref 82–98)
MICROALBUMIN UR DL<=1MG/L-MCNC: 24 UG/ML
MONOCYTES # BLD AUTO: 0.8 K/UL (ref 0.3–1)
MONOCYTES NFR BLD: 11.6 % (ref 4–15)
NEUTROPHILS # BLD AUTO: 3.7 K/UL (ref 1.8–7.7)
NEUTROPHILS NFR BLD: 54.2 % (ref 38–73)
NONHDLC SERPL-MCNC: 141 MG/DL
NRBC BLD-RTO: 0 /100 WBC
PLATELET # BLD AUTO: 322 K/UL (ref 150–450)
PMV BLD AUTO: 9.1 FL (ref 9.2–12.9)
POTASSIUM SERPL-SCNC: 4.1 MMOL/L (ref 3.5–5.1)
PROT SERPL-MCNC: 7.8 G/DL (ref 6–8.4)
RBC # BLD AUTO: 5.51 M/UL (ref 4.6–6.2)
SODIUM SERPL-SCNC: 136 MMOL/L (ref 136–145)
TRIGL SERPL-MCNC: 301 MG/DL (ref 30–150)
TSH SERPL DL<=0.005 MIU/L-ACNC: 2.15 UIU/ML (ref 0.4–4)
WBC # BLD AUTO: 6.74 K/UL (ref 3.9–12.7)

## 2022-08-15 PROCEDURE — 85025 COMPLETE CBC W/AUTO DIFF WBC: CPT | Performed by: FAMILY MEDICINE

## 2022-08-15 PROCEDURE — 82570 ASSAY OF URINE CREATININE: CPT | Performed by: FAMILY MEDICINE

## 2022-08-15 PROCEDURE — 84443 ASSAY THYROID STIM HORMONE: CPT | Performed by: FAMILY MEDICINE

## 2022-08-15 PROCEDURE — 80061 LIPID PANEL: CPT | Performed by: FAMILY MEDICINE

## 2022-08-15 PROCEDURE — 83036 HEMOGLOBIN GLYCOSYLATED A1C: CPT | Performed by: FAMILY MEDICINE

## 2022-08-15 PROCEDURE — 80053 COMPREHEN METABOLIC PANEL: CPT | Performed by: FAMILY MEDICINE

## 2022-08-15 PROCEDURE — 82043 UR ALBUMIN QUANTITATIVE: CPT | Performed by: FAMILY MEDICINE

## 2022-08-15 PROCEDURE — 36415 COLL VENOUS BLD VENIPUNCTURE: CPT | Performed by: FAMILY MEDICINE

## 2022-08-16 ENCOUNTER — CLINICAL SUPPORT (OUTPATIENT)
Dept: REHABILITATION | Facility: HOSPITAL | Age: 52
End: 2022-08-16
Payer: COMMERCIAL

## 2022-08-16 DIAGNOSIS — R26.2 DIFFICULTY WALKING: Primary | ICD-10-CM

## 2022-08-16 PROCEDURE — 97140 MANUAL THERAPY 1/> REGIONS: CPT

## 2022-08-16 PROCEDURE — 97110 THERAPEUTIC EXERCISES: CPT

## 2022-08-16 NOTE — PROGRESS NOTES
Physical Therapy Daily Note     Name: Umberto Okeefe  Clinic Number: [id number]  Diagnosis:   No diagnosis found.  Physician: Andrew Lawson, DO  Precautions: weight bearing   Visit #: 6 of 12  PTA Visit #: 0  Time In 200  Time Out: 230    Subjective     Pt reports: no complaints.  States he is 100% weight bearing with boot walker   Pain Scale: Umberto rates pain on a scale of 0-10 to be n/a currently.    Objective     Umberto received individual therapeutic exercises to develop strength, endurance, ROM and flexibility for 20 minutes including:  Nustep x 10  4 way hip SLR 3 x 15  Active ankle PF/DFx 50  Towel scrunches x 50  Gauze pick ups x 30  TKE x 50  Heel slides x 50  Active rom INV/EV x 50  Mini squats x 30  Step ups x 30    Umberto received the following manual therapy techniques: Myofacial release and Soft tissue Mobilization were applied to the: R ankle  for 8 minutes including:    Written Home Exercises Provided: yes  Pt demo good understanding of the education provided. Umberto demonstrated good return demonstration of activities.     Education provided re:  Umberto verbalized good understanding of education provided.   No spiritual or educational barriers to learning provided    Assessment     Patient tolerated ankle exercises with no adverse side effects   Making good progress, remains limited due to still having restrictions for strength and wearing the boot walker   This is a 52 y.o. male referred to outpatient physical therapy and presents with a medical diagnosis of R achilles tendon repair  and demonstrates limitations as described in the problem list. Pt prognosis is Good. Pt will continue to benefit from skilled outpatient physical therapy to address the deficits listed in the problem list, provide pt/family education and to maximize pt's level of independence in the home and community environment.     Goals as follows:  Medical necessity is  demonstrated by the following IMPAIRMENTS/PROBLEMS:  1. Increased Pain  2. Decreased Joint Mobility & Decreased ROM  3. Decreased strength  4. Decreased Flexibility BLE  5. Decreased Tolerance to Functional Activities  6. LEFS 23.7 0-80  LEFS Crosswalk (Ankle, Foot & Lower Leg)  CMS G-     Pt's spiritual, cultural and educational needs considered and pt agreeable to plan of care and goals as stated below:      Anticipated Barriers for physical therapy: weight bearing         Short Term GOALS: 4 weeks. Pt agrees with goals set.  1. Patient demonstrates independence with HEP.   2. Pt will have increased R ankle strength by 1/2 grade  3. Pt will have 0-5 deg DF  4.Pt will be ind with gait with crutches 50% weight bearing      Long Term GOALS: 8  weeks. Pt agrees with goals set.  1. Pt will have increased 1 muscle grade R ankle   2. Pt will have full rom R ankle   3. Pt will be ind with amb in community without assistive device      Functional Limitations Reports - G Codes  Category: mobility  Tool: LEFS  Score: 24/80      Plan     Continue with established Plan of Care towards PT goals.    Therapist: Michelle Patel, PT  8/16/2022

## 2022-08-18 ENCOUNTER — CLINICAL SUPPORT (OUTPATIENT)
Dept: REHABILITATION | Facility: HOSPITAL | Age: 52
End: 2022-08-18
Payer: COMMERCIAL

## 2022-08-18 DIAGNOSIS — S86.011S RUPTURE OF RIGHT ACHILLES TENDON, SEQUELA: Primary | ICD-10-CM

## 2022-08-18 PROCEDURE — 97140 MANUAL THERAPY 1/> REGIONS: CPT

## 2022-08-18 PROCEDURE — 97110 THERAPEUTIC EXERCISES: CPT

## 2022-08-18 NOTE — PROGRESS NOTES
Physical Therapy Daily Note     Name: Umberto Okeefe  Clinic Number: [id number]  Diagnosis:   Encounter Diagnosis   Name Primary?    Rupture of right Achilles tendon, sequela Yes     Physician: Andrew Lawson,   Precautions: weight bearing   Visit #: 7 of 12  PTA Visit #: 0  Time In 200  Time Out: 230    Subjective     Pt reports: no complaints.  States he is 100% weight bearing with boot walker   Pain Scale: Umberto rates pain on a scale of 0-10 to be n/a currently.    Objective     Umberto received individual therapeutic exercises to develop strength, endurance, ROM and flexibility for 20 minutes including:  Nustep x 10  4 way hip SLR 3 x 15  Active ankle PF/DFx 50  Towel scrunches x 50  Gauze pick ups x 30  TKE x 50  Heel slides x 50  Active rom INV/EV x 50  Mini squats x 30  Step ups x 30     Umberto received the following manual therapy techniques: Myofacial release and Soft tissue Mobilization were applied to the: R ankle  for 8 minutes including:    Written Home Exercises Provided: yes  Pt demo good understanding of the education provided. Umberto demonstrated good return demonstration of activities.     Education provided re:  Umberto verbalized good understanding of education provided.   No spiritual or educational barriers to learning provided    Assessment     Patient tolerated ankle exercises with no adverse side effects   Making good progress, remains limited due to still having restrictions for strength and wearing the boot walker   This is a 52 y.o. male referred to outpatient physical therapy and presents with a medical diagnosis of R achilles tendon repair  and demonstrates limitations as described in the problem list. Pt prognosis is Good. Pt will continue to benefit from skilled outpatient physical therapy to address the deficits listed in the problem list, provide pt/family education and to maximize pt's level of independence in the home and  community environment.     Goals as follows:  Medical necessity is demonstrated by the following IMPAIRMENTS/PROBLEMS:  1. Increased Pain  2. Decreased Joint Mobility & Decreased ROM  3. Decreased strength  4. Decreased Flexibility BLE  5. Decreased Tolerance to Functional Activities  6. LEFS 23.7 0-80  LEFS Crosswalk (Ankle, Foot & Lower Leg)  CMS G-     Pt's spiritual, cultural and educational needs considered and pt agreeable to plan of care and goals as stated below:      Anticipated Barriers for physical therapy: weight bearing         Short Term GOALS: 4 weeks. Pt agrees with goals set.  1. Patient demonstrates independence with HEP. met  2. Pt will have increased R ankle strength by 1/2 grade met  3. Pt will have 0-5 deg DF met  4.Pt will be ind with gait with crutches 50% weight bearing met     Long Term GOALS: 8  weeks. Pt agrees with goals set.  1. Pt will have increased 1 muscle grade R ankle   2. Pt will have full rom R ankle   3. Pt will be ind with amb in community without assistive device      Functional Limitations Reports - G Codes  Category: mobility  Tool: LEFS  Score: 24/80      Plan     Continue with established Plan of Care towards PT goals.  Awaiting new orders for aggressive exercises   Therapist: Michelle Patel, PT  8/18/2022

## 2022-08-23 ENCOUNTER — CLINICAL SUPPORT (OUTPATIENT)
Dept: REHABILITATION | Facility: HOSPITAL | Age: 52
End: 2022-08-23
Payer: COMMERCIAL

## 2022-08-23 ENCOUNTER — PATIENT MESSAGE (OUTPATIENT)
Dept: FAMILY MEDICINE | Facility: CLINIC | Age: 52
End: 2022-08-23
Payer: COMMERCIAL

## 2022-08-23 DIAGNOSIS — S86.011S RUPTURE OF RIGHT ACHILLES TENDON, SEQUELA: Primary | ICD-10-CM

## 2022-08-23 PROCEDURE — 97110 THERAPEUTIC EXERCISES: CPT

## 2022-08-23 PROCEDURE — 97140 MANUAL THERAPY 1/> REGIONS: CPT

## 2022-08-23 NOTE — PROGRESS NOTES
Physical Therapy Daily Note     Name: Umberto Okeefe  Clinic Number: [id number]  Diagnosis:   Encounter Diagnosis   Name Primary?    Rupture of right Achilles tendon, sequela Yes     Physician: Andrew Lawson,   Precautions: weight bearing   Visit #: 8 of 12  PTA Visit #: 0  Time In 200  Time Out: 230    Subjective     Pt reports: no complaints.  States he is 100% weight bearing with boot walker   Pain Scale: Umberto rates pain on a scale of 0-10 to be n/a currently.    Objective     Umberto received individual therapeutic exercises to develop strength, endurance, ROM and flexibility for 20 minutes including:  Nustep x 10  4 way hip SLR 3 x 15  Active ankle PF/DFx 50  Towel scrunches x 50  Gauze pick ups x 30  TKE x 50  Heel slides x 50  Active rom INV/EV x 50  Mini squats x 30  Step ups x 30     Umberto received the following manual therapy techniques: Myofacial release and Soft tissue Mobilization were applied to the: R ankle  for 8 minutes including:    Written Home Exercises Provided: yes  Pt demo good understanding of the education provided. Umberto demonstrated good return demonstration of activities.     Education provided re:  Umberto verbalized good understanding of education provided.   No spiritual or educational barriers to learning provided    Assessment     Patient tolerated ankle exercises with no adverse side effects   Making good progress, remains limited due to still having restrictions for strength and wearing the boot walker   This is a 52 y.o. male referred to outpatient physical therapy and presents with a medical diagnosis of R achilles tendon repair  and demonstrates limitations as described in the problem list. Pt prognosis is Good. Pt will continue to benefit from skilled outpatient physical therapy to address the deficits listed in the problem list, provide pt/family education and to maximize pt's level of independence in the home and  community environment.     Goals as follows:  Medical necessity is demonstrated by the following IMPAIRMENTS/PROBLEMS:  1. Increased Pain  2. Decreased Joint Mobility & Decreased ROM  3. Decreased strength  4. Decreased Flexibility BLE  5. Decreased Tolerance to Functional Activities  6. LEFS 23.7 0-80  LEFS Crosswalk (Ankle, Foot & Lower Leg)  CMS G-     Pt's spiritual, cultural and educational needs considered and pt agreeable to plan of care and goals as stated below:      Anticipated Barriers for physical therapy: weight bearing         Short Term GOALS: 4 weeks. Pt agrees with goals set.  1. Patient demonstrates independence with HEP. met  2. Pt will have increased R ankle strength by 1/2 grade met  3. Pt will have 0-5 deg DF met  4.Pt will be ind with gait with crutches 50% weight bearing met     Long Term GOALS: 8  weeks. Pt agrees with goals set.  1. Pt will have increased 1 muscle grade R ankle   2. Pt will have full rom R ankle   3. Pt will be ind with amb in community without assistive device      Functional Limitations Reports - G Codes  Category: mobility  Tool: LEFS  Score: 24/80      Plan     Continue with established Plan of Care towards PT goals.  Awaiting new orders for aggressive exercises   Therapist: Michelle Patel, PT  8/23/2022

## 2022-08-25 ENCOUNTER — CLINICAL SUPPORT (OUTPATIENT)
Dept: REHABILITATION | Facility: HOSPITAL | Age: 52
End: 2022-08-25
Payer: COMMERCIAL

## 2022-08-25 DIAGNOSIS — S86.011S RUPTURE OF RIGHT ACHILLES TENDON, SEQUELA: Primary | ICD-10-CM

## 2022-08-25 PROCEDURE — 97110 THERAPEUTIC EXERCISES: CPT

## 2022-08-25 PROCEDURE — 97140 MANUAL THERAPY 1/> REGIONS: CPT

## 2022-08-25 NOTE — PROGRESS NOTES
Physical Therapy Daily Note     Name: Umberto Okeefe  Clinic Number: [id number]  Diagnosis:   Encounter Diagnosis   Name Primary?    Rupture of right Achilles tendon, sequela Yes     Physician: Andrew Lawson,   Precautions: weight bearing   Visit #: 9 of 12  PTA Visit #: 0  Time In 200  Time Out: 230    Subjective     Pt reports: no complaints.  States he is 100% weight bearing with boot walker   Pain Scale: Umberto rates pain on a scale of 0-10 to be n/a currently.    Objective     Umberto received individual therapeutic exercises to develop strength, endurance, ROM and flexibility for 20 minutes including:  Nustep x 10  4 way hip SLR 3 x 15  Active ankle PF/DFx 80 YTB in DF   Towel scrunches x 50  Gauze pick ups x 30  TKE x 50  Heel slides x 50  Active rom INV/EV  YTB x 80  Mini squats x 30  Step ups x 30   Leg press x 40 70#  Step upss BLE x 40    Umberto received the following manual therapy techniques: Myofacial release and Soft tissue Mobilization were applied to the: R ankle  for 8 minutes including:    Written Home Exercises Provided: yes  Pt demo good understanding of the education provided. Umberto demonstrated good return demonstration of activities.     Education provided re:  Umberto verbalized good understanding of education provided.   No spiritual or educational barriers to learning provided    Assessment     Patient tolerated ankle exercises with no adverse side effects   Making good progress, remains limited due to still having restrictions for strength and wearing the boot walker   This is a 52 y.o. male referred to outpatient physical therapy and presents with a medical diagnosis of R achilles tendon repair  and demonstrates limitations as described in the problem list. Pt prognosis is Good. Pt will continue to benefit from skilled outpatient physical therapy to address the deficits listed in the problem list, provide pt/family education and to  maximize pt's level of independence in the home and community environment.     Goals as follows:  Medical necessity is demonstrated by the following IMPAIRMENTS/PROBLEMS:  1. Increased Pain  2. Decreased Joint Mobility & Decreased ROM  3. Decreased strength  4. Decreased Flexibility BLE  5. Decreased Tolerance to Functional Activities  6. LEFS 23.7 0-80  LEFS Crosswalk (Ankle, Foot & Lower Leg)  CMS G-     Pt's spiritual, cultural and educational needs considered and pt agreeable to plan of care and goals as stated below:      Anticipated Barriers for physical therapy: weight bearing         Short Term GOALS: 4 weeks. Pt agrees with goals set.  1. Patient demonstrates independence with HEP. met  2. Pt will have increased R ankle strength by 1/2 grade met  3. Pt will have 0-5 deg DF met  4.Pt will be ind with gait with crutches 50% weight bearing met     Long Term GOALS: 8  weeks. Pt agrees with goals set.  1. Pt will have increased 1 muscle grade R ankle   2. Pt will have full rom R ankle   3. Pt will be ind with amb in community without assistive device      Functional Limitations Reports - G Codes  Category: mobility  Tool: LEFS  Score: 24/80      Plan     Continue with established Plan of Care towards PT goals.  Awaiting new orders for aggressive exercises   Therapist: Michelle Patel, PT  8/25/2022

## 2022-08-29 ENCOUNTER — PATIENT MESSAGE (OUTPATIENT)
Dept: ORTHOPEDICS | Facility: CLINIC | Age: 52
End: 2022-08-29
Payer: COMMERCIAL

## 2022-08-31 ENCOUNTER — OFFICE VISIT (OUTPATIENT)
Dept: FAMILY MEDICINE | Facility: CLINIC | Age: 52
End: 2022-08-31
Payer: COMMERCIAL

## 2022-08-31 ENCOUNTER — OFFICE VISIT (OUTPATIENT)
Dept: ORTHOPEDICS | Facility: CLINIC | Age: 52
End: 2022-08-31
Payer: COMMERCIAL

## 2022-08-31 ENCOUNTER — PATIENT MESSAGE (OUTPATIENT)
Dept: ORTHOPEDICS | Facility: CLINIC | Age: 52
End: 2022-08-31

## 2022-08-31 VITALS
RESPIRATION RATE: 15 BRPM | HEIGHT: 70 IN | SYSTOLIC BLOOD PRESSURE: 124 MMHG | HEART RATE: 86 BPM | BODY MASS INDEX: 32.21 KG/M2 | OXYGEN SATURATION: 98 % | WEIGHT: 225 LBS | DIASTOLIC BLOOD PRESSURE: 78 MMHG

## 2022-08-31 VITALS — RESPIRATION RATE: 15 BRPM | HEIGHT: 70 IN | BODY MASS INDEX: 30.93 KG/M2 | WEIGHT: 216.06 LBS

## 2022-08-31 DIAGNOSIS — E11.9 TYPE 2 DIABETES MELLITUS WITHOUT COMPLICATION, WITHOUT LONG-TERM CURRENT USE OF INSULIN: Primary | ICD-10-CM

## 2022-08-31 DIAGNOSIS — Z98.890 S/P ACHILLES TENDON REPAIR: Primary | ICD-10-CM

## 2022-08-31 PROCEDURE — 3061F NEG MICROALBUMINURIA REV: CPT | Mod: CPTII,S$GLB,, | Performed by: ORTHOPAEDIC SURGERY

## 2022-08-31 PROCEDURE — 3061F PR NEG MICROALBUMINURIA RESULT DOCUMENTED/REVIEW: ICD-10-PCS | Mod: CPTII,S$GLB,, | Performed by: FAMILY MEDICINE

## 2022-08-31 PROCEDURE — 3061F PR NEG MICROALBUMINURIA RESULT DOCUMENTED/REVIEW: ICD-10-PCS | Mod: CPTII,S$GLB,, | Performed by: ORTHOPAEDIC SURGERY

## 2022-08-31 PROCEDURE — 1159F PR MEDICATION LIST DOCUMENTED IN MEDICAL RECORD: ICD-10-PCS | Mod: CPTII,S$GLB,, | Performed by: ORTHOPAEDIC SURGERY

## 2022-08-31 PROCEDURE — 3046F PR MOST RECENT HEMOGLOBIN A1C LEVEL > 9.0%: ICD-10-PCS | Mod: CPTII,S$GLB,, | Performed by: ORTHOPAEDIC SURGERY

## 2022-08-31 PROCEDURE — 3078F DIAST BP <80 MM HG: CPT | Mod: CPTII,S$GLB,, | Performed by: FAMILY MEDICINE

## 2022-08-31 PROCEDURE — 3074F SYST BP LT 130 MM HG: CPT | Mod: CPTII,S$GLB,, | Performed by: FAMILY MEDICINE

## 2022-08-31 PROCEDURE — 1160F RVW MEDS BY RX/DR IN RCRD: CPT | Mod: CPTII,S$GLB,, | Performed by: FAMILY MEDICINE

## 2022-08-31 PROCEDURE — 3066F NEPHROPATHY DOC TX: CPT | Mod: CPTII,S$GLB,, | Performed by: FAMILY MEDICINE

## 2022-08-31 PROCEDURE — 4010F ACE/ARB THERAPY RXD/TAKEN: CPT | Mod: CPTII,S$GLB,, | Performed by: ORTHOPAEDIC SURGERY

## 2022-08-31 PROCEDURE — 1159F MED LIST DOCD IN RCRD: CPT | Mod: CPTII,S$GLB,, | Performed by: FAMILY MEDICINE

## 2022-08-31 PROCEDURE — 1159F MED LIST DOCD IN RCRD: CPT | Mod: CPTII,S$GLB,, | Performed by: ORTHOPAEDIC SURGERY

## 2022-08-31 PROCEDURE — 3008F PR BODY MASS INDEX (BMI) DOCUMENTED: ICD-10-PCS | Mod: CPTII,S$GLB,, | Performed by: ORTHOPAEDIC SURGERY

## 2022-08-31 PROCEDURE — 3046F HEMOGLOBIN A1C LEVEL >9.0%: CPT | Mod: CPTII,S$GLB,, | Performed by: ORTHOPAEDIC SURGERY

## 2022-08-31 PROCEDURE — 4010F PR ACE/ARB THEARPY RXD/TAKEN: ICD-10-PCS | Mod: CPTII,S$GLB,, | Performed by: ORTHOPAEDIC SURGERY

## 2022-08-31 PROCEDURE — 99024 POSTOP FOLLOW-UP VISIT: CPT | Mod: S$GLB,,, | Performed by: ORTHOPAEDIC SURGERY

## 2022-08-31 PROCEDURE — 3008F BODY MASS INDEX DOCD: CPT | Mod: CPTII,S$GLB,, | Performed by: FAMILY MEDICINE

## 2022-08-31 PROCEDURE — 1160F PR REVIEW ALL MEDS BY PRESCRIBER/CLIN PHARMACIST DOCUMENTED: ICD-10-PCS | Mod: CPTII,S$GLB,, | Performed by: FAMILY MEDICINE

## 2022-08-31 PROCEDURE — 3008F BODY MASS INDEX DOCD: CPT | Mod: CPTII,S$GLB,, | Performed by: ORTHOPAEDIC SURGERY

## 2022-08-31 PROCEDURE — 99999 PR PBB SHADOW E&M-EST. PATIENT-LVL III: ICD-10-PCS | Mod: PBBFAC,,, | Performed by: ORTHOPAEDIC SURGERY

## 2022-08-31 PROCEDURE — 3074F PR MOST RECENT SYSTOLIC BLOOD PRESSURE < 130 MM HG: ICD-10-PCS | Mod: CPTII,S$GLB,, | Performed by: FAMILY MEDICINE

## 2022-08-31 PROCEDURE — 99024 PR POST-OP FOLLOW-UP VISIT: ICD-10-PCS | Mod: S$GLB,,, | Performed by: ORTHOPAEDIC SURGERY

## 2022-08-31 PROCEDURE — 99999 PR PBB SHADOW E&M-EST. PATIENT-LVL IV: CPT | Mod: PBBFAC,,, | Performed by: FAMILY MEDICINE

## 2022-08-31 PROCEDURE — 3046F HEMOGLOBIN A1C LEVEL >9.0%: CPT | Mod: CPTII,S$GLB,, | Performed by: FAMILY MEDICINE

## 2022-08-31 PROCEDURE — 3046F PR MOST RECENT HEMOGLOBIN A1C LEVEL > 9.0%: ICD-10-PCS | Mod: CPTII,S$GLB,, | Performed by: FAMILY MEDICINE

## 2022-08-31 PROCEDURE — 99999 PR PBB SHADOW E&M-EST. PATIENT-LVL IV: ICD-10-PCS | Mod: PBBFAC,,, | Performed by: FAMILY MEDICINE

## 2022-08-31 PROCEDURE — 3008F PR BODY MASS INDEX (BMI) DOCUMENTED: ICD-10-PCS | Mod: CPTII,S$GLB,, | Performed by: FAMILY MEDICINE

## 2022-08-31 PROCEDURE — 3066F PR DOCUMENTATION OF TREATMENT FOR NEPHROPATHY: ICD-10-PCS | Mod: CPTII,S$GLB,, | Performed by: ORTHOPAEDIC SURGERY

## 2022-08-31 PROCEDURE — 3061F NEG MICROALBUMINURIA REV: CPT | Mod: CPTII,S$GLB,, | Performed by: FAMILY MEDICINE

## 2022-08-31 PROCEDURE — 1159F PR MEDICATION LIST DOCUMENTED IN MEDICAL RECORD: ICD-10-PCS | Mod: CPTII,S$GLB,, | Performed by: FAMILY MEDICINE

## 2022-08-31 PROCEDURE — 3066F PR DOCUMENTATION OF TREATMENT FOR NEPHROPATHY: ICD-10-PCS | Mod: CPTII,S$GLB,, | Performed by: FAMILY MEDICINE

## 2022-08-31 PROCEDURE — 99214 OFFICE O/P EST MOD 30 MIN: CPT | Mod: S$GLB,,, | Performed by: FAMILY MEDICINE

## 2022-08-31 PROCEDURE — 3078F PR MOST RECENT DIASTOLIC BLOOD PRESSURE < 80 MM HG: ICD-10-PCS | Mod: CPTII,S$GLB,, | Performed by: FAMILY MEDICINE

## 2022-08-31 PROCEDURE — 99214 PR OFFICE/OUTPT VISIT, EST, LEVL IV, 30-39 MIN: ICD-10-PCS | Mod: S$GLB,,, | Performed by: FAMILY MEDICINE

## 2022-08-31 PROCEDURE — 3066F NEPHROPATHY DOC TX: CPT | Mod: CPTII,S$GLB,, | Performed by: ORTHOPAEDIC SURGERY

## 2022-08-31 PROCEDURE — 4010F PR ACE/ARB THEARPY RXD/TAKEN: ICD-10-PCS | Mod: CPTII,S$GLB,, | Performed by: FAMILY MEDICINE

## 2022-08-31 PROCEDURE — 99999 PR PBB SHADOW E&M-EST. PATIENT-LVL III: CPT | Mod: PBBFAC,,, | Performed by: ORTHOPAEDIC SURGERY

## 2022-08-31 PROCEDURE — 4010F ACE/ARB THERAPY RXD/TAKEN: CPT | Mod: CPTII,S$GLB,, | Performed by: FAMILY MEDICINE

## 2022-08-31 RX ORDER — DULAGLUTIDE 1.5 MG/.5ML
1.5 INJECTION, SOLUTION SUBCUTANEOUS WEEKLY
COMMUNITY
Start: 2022-07-11 | End: 2022-12-19

## 2022-08-31 NOTE — LETTER
August 31, 2022      [facility]  [address] [place] [address]  Phone: [phone]  Fax: [phone]       Patient: Umberto Okeefe   YOB: 1970  Date of Visit: 08/31/2022    To Whom It May Concern:    Ayde Okeefe  was at [facility] on 08/31/2022. The patient may return to work/school on 9/1/22 with restrictions.Work restrictions include standing and walking as tolerated. If you have any questions or concerns, or if I can be of further assistance, please do not hesitate to contact our office.    Sincerely,        Cristal Munroe LPN

## 2022-08-31 NOTE — PROGRESS NOTES
" Patient ID: Umberto Okeefe is a 52 y.o. male.    Chief Complaint: Follow-up (BW review)      Reviewed family, medical, surgical, and social history.    No cp/sob  No change in mental status  No fever  No asymmetrical limb swelling    Objective:      /78 (BP Location: Left arm, Patient Position: Sitting, BP Method: Medium (Manual))   Pulse 86   Resp 15   Ht 5' 10" (1.778 m)   Wt 102.1 kg (225 lb)   SpO2 98%   BMI 32.28 kg/m²   RRR, CTAB, s/nt/nd, no c/c/e, non-toxic appearing, no focal weakness  Assessment:       1. Type 2 diabetes mellitus without complication, without long-term current use of insulin        Plan:       Type 2 diabetes mellitus without complication, without long-term current use of insulin  -     Lipid Panel; Future; Expected date: 11/30/2022  -     CBC Auto Differential; Future; Expected date: 11/30/2022  -     Comprehensive Metabolic Panel; Future; Expected date: 11/30/2022  -     TSH; Future; Expected date: 11/30/2022  -     Microalbumin/Creatinine Ratio, Urine; Future; Expected date: 11/30/2022  -     Hemoglobin A1C; Future; Expected date: 11/30/2022            Continue current medicines, any changes noted above  Increase levemir to 50 units, will check blood sugars and we will adjust the sugars as needed  Labs, radiology studies, and procedures/notes from the last 3 months were reviewed.    Risks, benefits, and side effects were discussed with the patient. All questions were answered to the fullest satisfaction of the patient, and pt verbalized understanding and agreement to treatment plan. Pt was to call with any new or worsening symptoms, or present to the ER.      "

## 2022-08-31 NOTE — PROGRESS NOTES
Chief Complaint: Post-op Evaluation of the Right Ankle        HPI:  Mr. Okeefe returned today for a 2-1/2 month follow-up visit on Achilles tendon repair of his right heel.  His date of surgery 06/09/2022.  He stated he is doing well and his pain is well controlled. He has been going to physical therapy has been working on motion with limited strength training.  He feels with a little bit more physical therapy is strength will return.  He has been full weight-bearing but is still wearing his cam walker.  He injured his right Achilles on 05/30/2022 when he was playing basketball with his son and he went to take off and felt a pop in his right heel.      ROS:  No new diagnosis/surgery/prescriptions since last office visit on 07/19/2022.  Constitution: Negative for chills and fever.   HENT: Negative for congestion.    Eyes: Negative for blurred vision.   Respiratory: Negative for cough.    Endocrine: Negative for polydipsia.   Hematologic/Lymphatic: Negative for adenopathy.   Skin: Negative for flushing, itching and rash.   Musculoskeletal: Positive for joint pain and joint swelling. Negative for gout.   Gastrointestinal: Positive for heartburn. Negative for constipation and diarrhea.   Genitourinary: Negative for nocturia.   Neurological: Negative for headaches and seizures.   Psychiatric/Behavioral: Negative for depression and substance abuse. The patient is not nervous/anxious.    Allergic/Immunologic: Positive for environmental allergies.       Objective:   Physical Exam:   General: AAOx3.  No acute distress  Vascular:  Pulses intact and equal bilaterally.  Capillary refill less than 3 seconds and equal bilaterally  Neurologic:  Pinprick and soft touch intact and equal bilaterally  Integment:  Incision well approximated and well healed.  Extremity:  Ankle/foot:  Dorsiflexion/plantar flexion near equal bilaterally.  Inversion/eversion near equal bilaterally.  No swelling.  Nontender with palpation Achilles  tendon right heel.  Achilles palpable throughout full distribution right heel.  Able to do double heel raise.  Can do 1-2 single heel raises with the right lower extremity.  Nontender with motion.  Radiography:   No new x-rays done today.      Assessment:       Impression:      1. S/P Achilles tendon repair, right heel                Plan:       1.  Discussed physical examination and radiographic evaluation with the patient. Umberto understands that he appears to be doing well with a little bit more physical therapy his strength should improve.  At this point he can stop using his cam walker..  2.  Discontinue cam walker  3. Any pain can be treated with over-the-counter medications dosed per box instructions.  4. Refer to back physical therapy to start aggressive strengthening of the right lower extremity Achilles repair  5. Work restrictions include standing and walking as tolerated.    6.Follow up in 6 weeks expect to return the patient to full activities.

## 2022-09-03 ENCOUNTER — PATIENT MESSAGE (OUTPATIENT)
Dept: FAMILY MEDICINE | Facility: CLINIC | Age: 52
End: 2022-09-03
Payer: COMMERCIAL

## 2022-09-06 RX ORDER — AZITHROMYCIN 250 MG/1
TABLET, FILM COATED ORAL
Qty: 6 TABLET | Refills: 0 | Status: SHIPPED | OUTPATIENT
Start: 2022-09-06 | End: 2022-12-09

## 2022-09-06 RX ORDER — PROMETHAZINE HYDROCHLORIDE AND DEXTROMETHORPHAN HYDROBROMIDE 6.25; 15 MG/5ML; MG/5ML
5 SYRUP ORAL EVERY 6 HOURS PRN
Qty: 240 ML | Refills: 1 | Status: SHIPPED | OUTPATIENT
Start: 2022-09-06 | End: 2022-12-09

## 2022-09-08 ENCOUNTER — PATIENT MESSAGE (OUTPATIENT)
Dept: ORTHOPEDICS | Facility: CLINIC | Age: 52
End: 2022-09-08
Payer: COMMERCIAL

## 2022-10-10 ENCOUNTER — CLINICAL SUPPORT (OUTPATIENT)
Dept: REHABILITATION | Facility: HOSPITAL | Age: 52
End: 2022-10-10
Attending: ORTHOPAEDIC SURGERY
Payer: COMMERCIAL

## 2022-10-10 DIAGNOSIS — S86.011S RUPTURE OF RIGHT ACHILLES TENDON, SEQUELA: Primary | ICD-10-CM

## 2022-10-10 DIAGNOSIS — Z98.890 S/P ACHILLES TENDON REPAIR: ICD-10-CM

## 2022-10-10 PROCEDURE — 97110 THERAPEUTIC EXERCISES: CPT

## 2022-10-10 NOTE — PROGRESS NOTES
Physical Therapy Daily Note     Name: Umberto Okeefe  Clinic Number: [id number]  Diagnosis:   Encounter Diagnoses   Name Primary?    S/P Achilles tendon repair     Rupture of right Achilles tendon, sequela Yes     Physician: Andrew Lawson,   Precautions: weight bearing   Visit #: 10 of 12  PTA Visit #: 0  Time In 115  Time Out:     Subjective     Pt reports: no complaints.  Pain Scale: Umberto rates pain on a scale of 0-10 to be n/a currently.    Objective     Umberto received individual therapeutic exercises to develop strength, endurance, ROM and flexibility for 20 minutes including:  Nustep x 10  Heel slides x 50 GTB   Mini squats x 30  Step ups x 30   Leg press x 40 70#  Step ups BLE x 40  Toe raises x 50  SLS x 30 sec x 6  Standing hip abd x 60  Bosu squats x 50     Umberto received the following manual therapy techniques: Myofacial release and Soft tissue Mobilization were applied to the: R ankle  for 8 minutes including:    Written Home Exercises Provided: yes  Pt demo good understanding of the education provided. Umberto demonstrated good return demonstration of activities.     Education provided re:  Umberto verbalized good understanding of education provided.   No spiritual or educational barriers to learning provided    Assessment     Patient tolerated ankle exercises with no adverse side effects   Making good progress, remains limited due to still having restrictions for strength and wearing the boot walker   This is a 52 y.o. male referred to outpatient physical therapy and presents with a medical diagnosis of R achilles tendon repair  and demonstrates limitations as described in the problem list. Pt prognosis is Good. Pt will continue to benefit from skilled outpatient physical therapy to address the deficits listed in the problem list, provide pt/family education and to maximize pt's level of independence in the home and community environment.     Goals  as follows:  Medical necessity is demonstrated by the following IMPAIRMENTS/PROBLEMS:  1. Increased Pain  2. Decreased Joint Mobility & Decreased ROM  3. Decreased strength  4. Decreased Flexibility BLE  5. Decreased Tolerance to Functional Activities  6. LEFS 23.7 0-80  LEFS Crosswalk (Ankle, Foot & Lower Leg)  CMS G-     Pt's spiritual, cultural and educational needs considered and pt agreeable to plan of care and goals as stated below:      Anticipated Barriers for physical therapy: weight bearing         Short Term GOALS: 4 weeks. Pt agrees with goals set.  1. Patient demonstrates independence with HEP. met  2. Pt will have increased R ankle strength by 1/2 grade met  3. Pt will have 0-5 deg DF met  4.Pt will be ind with gait with crutches 50% weight bearing met     Long Term GOALS: 8  weeks. Pt agrees with goals set.  1. Pt will have increased 1 muscle grade R ankle   2. Pt will have full rom R ankle   3. Pt will be ind with amb in community without assistive device      Functional Limitations Reports - G Codes  Category: mobility  Tool: LEFS  Score: 24/80      Plan     Continue with established Plan of Care towards PT goals.  Awaiting new orders for aggressive exercises   Therapist: Michelle Patel, PT  10/10/2022

## 2022-11-14 ENCOUNTER — PATIENT MESSAGE (OUTPATIENT)
Dept: ADMINISTRATIVE | Facility: HOSPITAL | Age: 52
End: 2022-11-14
Payer: COMMERCIAL

## 2022-11-16 ENCOUNTER — LAB VISIT (OUTPATIENT)
Dept: LAB | Facility: HOSPITAL | Age: 52
End: 2022-11-16
Attending: FAMILY MEDICINE
Payer: COMMERCIAL

## 2022-11-16 DIAGNOSIS — E11.9 TYPE 2 DIABETES MELLITUS WITHOUT COMPLICATION, WITHOUT LONG-TERM CURRENT USE OF INSULIN: ICD-10-CM

## 2022-11-16 LAB
ALBUMIN SERPL BCP-MCNC: 4.3 G/DL (ref 3.5–5.2)
ALBUMIN/CREAT UR: 29.6 UG/MG (ref 0–30)
ALP SERPL-CCNC: 95 U/L (ref 55–135)
ALT SERPL W/O P-5'-P-CCNC: 24 U/L (ref 10–44)
ANION GAP SERPL CALC-SCNC: 10 MMOL/L (ref 8–16)
AST SERPL-CCNC: 18 U/L (ref 10–40)
BASOPHILS # BLD AUTO: 0.06 K/UL (ref 0–0.2)
BASOPHILS NFR BLD: 1.2 % (ref 0–1.9)
BILIRUB SERPL-MCNC: 0.5 MG/DL (ref 0.1–1)
BUN SERPL-MCNC: 14 MG/DL (ref 6–20)
CALCIUM SERPL-MCNC: 9.4 MG/DL (ref 8.7–10.5)
CHLORIDE SERPL-SCNC: 100 MMOL/L (ref 95–110)
CHOLEST SERPL-MCNC: 157 MG/DL (ref 120–199)
CHOLEST/HDLC SERPL: 5.2 {RATIO} (ref 2–5)
CO2 SERPL-SCNC: 27 MMOL/L (ref 23–29)
CREAT SERPL-MCNC: 1.2 MG/DL (ref 0.5–1.4)
CREAT UR-MCNC: 142 MG/DL (ref 23–375)
DIFFERENTIAL METHOD: ABNORMAL
EOSINOPHIL # BLD AUTO: 0.2 K/UL (ref 0–0.5)
EOSINOPHIL NFR BLD: 3.5 % (ref 0–8)
ERYTHROCYTE [DISTWIDTH] IN BLOOD BY AUTOMATED COUNT: 12.4 % (ref 11.5–14.5)
EST. GFR  (NO RACE VARIABLE): >60 ML/MIN/1.73 M^2
ESTIMATED AVG GLUCOSE: 171 MG/DL (ref 68–131)
GLUCOSE SERPL-MCNC: 160 MG/DL (ref 70–110)
HBA1C MFR BLD: 7.6 % (ref 4–5.6)
HCT VFR BLD AUTO: 46.8 % (ref 40–54)
HDLC SERPL-MCNC: 30 MG/DL (ref 40–75)
HDLC SERPL: 19.1 % (ref 20–50)
HGB BLD-MCNC: 15.9 G/DL (ref 14–18)
IMM GRANULOCYTES # BLD AUTO: 0.04 K/UL (ref 0–0.04)
IMM GRANULOCYTES NFR BLD AUTO: 0.8 % (ref 0–0.5)
LDLC SERPL CALC-MCNC: 96.4 MG/DL (ref 63–159)
LYMPHOCYTES # BLD AUTO: 1.5 K/UL (ref 1–4.8)
LYMPHOCYTES NFR BLD: 28 % (ref 18–48)
MCH RBC QN AUTO: 30.3 PG (ref 27–31)
MCHC RBC AUTO-ENTMCNC: 34 G/DL (ref 32–36)
MCV RBC AUTO: 89 FL (ref 82–98)
MICROALBUMIN UR DL<=1MG/L-MCNC: 42 UG/ML
MONOCYTES # BLD AUTO: 0.7 K/UL (ref 0.3–1)
MONOCYTES NFR BLD: 13.2 % (ref 4–15)
NEUTROPHILS # BLD AUTO: 2.8 K/UL (ref 1.8–7.7)
NEUTROPHILS NFR BLD: 53.3 % (ref 38–73)
NONHDLC SERPL-MCNC: 127 MG/DL
NRBC BLD-RTO: 0 /100 WBC
PLATELET # BLD AUTO: 321 K/UL (ref 150–450)
PMV BLD AUTO: 8.9 FL (ref 9.2–12.9)
POTASSIUM SERPL-SCNC: 4.2 MMOL/L (ref 3.5–5.1)
PROT SERPL-MCNC: 7.8 G/DL (ref 6–8.4)
RBC # BLD AUTO: 5.25 M/UL (ref 4.6–6.2)
SODIUM SERPL-SCNC: 137 MMOL/L (ref 136–145)
TRIGL SERPL-MCNC: 153 MG/DL (ref 30–150)
TSH SERPL DL<=0.005 MIU/L-ACNC: 1.03 UIU/ML (ref 0.4–4)
WBC # BLD AUTO: 5.21 K/UL (ref 3.9–12.7)

## 2022-11-16 PROCEDURE — 84443 ASSAY THYROID STIM HORMONE: CPT | Performed by: FAMILY MEDICINE

## 2022-11-16 PROCEDURE — 36415 COLL VENOUS BLD VENIPUNCTURE: CPT | Performed by: FAMILY MEDICINE

## 2022-11-16 PROCEDURE — 82570 ASSAY OF URINE CREATININE: CPT | Performed by: FAMILY MEDICINE

## 2022-11-16 PROCEDURE — 82043 UR ALBUMIN QUANTITATIVE: CPT | Performed by: FAMILY MEDICINE

## 2022-11-16 PROCEDURE — 83036 HEMOGLOBIN GLYCOSYLATED A1C: CPT | Performed by: FAMILY MEDICINE

## 2022-11-16 PROCEDURE — 85025 COMPLETE CBC W/AUTO DIFF WBC: CPT | Performed by: FAMILY MEDICINE

## 2022-11-16 PROCEDURE — 80061 LIPID PANEL: CPT | Performed by: FAMILY MEDICINE

## 2022-11-16 PROCEDURE — 80053 COMPREHEN METABOLIC PANEL: CPT | Performed by: FAMILY MEDICINE

## 2022-11-30 ENCOUNTER — OFFICE VISIT (OUTPATIENT)
Dept: ORTHOPEDICS | Facility: CLINIC | Age: 52
End: 2022-11-30
Payer: COMMERCIAL

## 2022-11-30 VITALS — RESPIRATION RATE: 14 BRPM | WEIGHT: 225.06 LBS | BODY MASS INDEX: 32.22 KG/M2 | HEIGHT: 70 IN

## 2022-11-30 DIAGNOSIS — Z98.890 S/P ACHILLES TENDON REPAIR: Primary | ICD-10-CM

## 2022-11-30 PROCEDURE — 1159F PR MEDICATION LIST DOCUMENTED IN MEDICAL RECORD: ICD-10-PCS | Mod: CPTII,S$GLB,, | Performed by: ORTHOPAEDIC SURGERY

## 2022-11-30 PROCEDURE — 3008F BODY MASS INDEX DOCD: CPT | Mod: CPTII,S$GLB,, | Performed by: ORTHOPAEDIC SURGERY

## 2022-11-30 PROCEDURE — 3061F PR NEG MICROALBUMINURIA RESULT DOCUMENTED/REVIEW: ICD-10-PCS | Mod: CPTII,S$GLB,, | Performed by: ORTHOPAEDIC SURGERY

## 2022-11-30 PROCEDURE — 3066F PR DOCUMENTATION OF TREATMENT FOR NEPHROPATHY: ICD-10-PCS | Mod: CPTII,S$GLB,, | Performed by: ORTHOPAEDIC SURGERY

## 2022-11-30 PROCEDURE — 3008F PR BODY MASS INDEX (BMI) DOCUMENTED: ICD-10-PCS | Mod: CPTII,S$GLB,, | Performed by: ORTHOPAEDIC SURGERY

## 2022-11-30 PROCEDURE — 4010F ACE/ARB THERAPY RXD/TAKEN: CPT | Mod: CPTII,S$GLB,, | Performed by: ORTHOPAEDIC SURGERY

## 2022-11-30 PROCEDURE — 3051F HG A1C>EQUAL 7.0%<8.0%: CPT | Mod: CPTII,S$GLB,, | Performed by: ORTHOPAEDIC SURGERY

## 2022-11-30 PROCEDURE — 99999 PR PBB SHADOW E&M-EST. PATIENT-LVL III: CPT | Mod: PBBFAC,,, | Performed by: ORTHOPAEDIC SURGERY

## 2022-11-30 PROCEDURE — 3051F PR MOST RECENT HEMOGLOBIN A1C LEVEL 7.0 - < 8.0%: ICD-10-PCS | Mod: CPTII,S$GLB,, | Performed by: ORTHOPAEDIC SURGERY

## 2022-11-30 PROCEDURE — 99212 OFFICE O/P EST SF 10 MIN: CPT | Mod: S$GLB,,, | Performed by: ORTHOPAEDIC SURGERY

## 2022-11-30 PROCEDURE — 99999 PR PBB SHADOW E&M-EST. PATIENT-LVL III: ICD-10-PCS | Mod: PBBFAC,,, | Performed by: ORTHOPAEDIC SURGERY

## 2022-11-30 PROCEDURE — 3066F NEPHROPATHY DOC TX: CPT | Mod: CPTII,S$GLB,, | Performed by: ORTHOPAEDIC SURGERY

## 2022-11-30 PROCEDURE — 4010F PR ACE/ARB THEARPY RXD/TAKEN: ICD-10-PCS | Mod: CPTII,S$GLB,, | Performed by: ORTHOPAEDIC SURGERY

## 2022-11-30 PROCEDURE — 1159F MED LIST DOCD IN RCRD: CPT | Mod: CPTII,S$GLB,, | Performed by: ORTHOPAEDIC SURGERY

## 2022-11-30 PROCEDURE — 99212 PR OFFICE/OUTPT VISIT, EST, LEVL II, 10-19 MIN: ICD-10-PCS | Mod: S$GLB,,, | Performed by: ORTHOPAEDIC SURGERY

## 2022-11-30 PROCEDURE — 3061F NEG MICROALBUMINURIA REV: CPT | Mod: CPTII,S$GLB,, | Performed by: ORTHOPAEDIC SURGERY

## 2022-11-30 NOTE — LETTER
November 30, 2022      [facility]  [address] [place] [address]  Phone: [phone]  Fax: [phone]       Patient: Umberto Okeefe   YOB: 1970  Date of Visit: 11/30/2022    To Whom It May Concern:    Ayde Okeefe  was at [facility] on 11/30/2022. The patient may return to work on 12/1/22 with no restrictions. If you have any questions or concerns, or if I can be of further assistance, please do not hesitate to contact me.    Sincerely,        Cristal Munroe LPN

## 2022-11-30 NOTE — PROGRESS NOTES
Chief Complaint: Post-op Evaluation of the Right Ankle        HPI:  Mr. Okeefe returned today for a final ollow-up visit on Achilles tendon repair of his right heel.  His date of surgery 06/09/2022.  He is almost 5 months post surgery.  He is doing well and can do single heel raises. He has finished physical therapy.  He injured his right Achilles on 05/30/2022 when he was playing basketball with his son and he went to take off and felt a pop in his right heel.      ROS:  No new diagnosis/surgery/prescriptions since last office visit on 08/31/2022  Constitution: Negative for chills and fever.   HENT: Negative for congestion.    Eyes: Negative for blurred vision.   Respiratory: Negative for cough.    Endocrine: Negative for polydipsia.   Hematologic/Lymphatic: Negative for adenopathy.   Skin: Negative for flushing, itching and rash.   Musculoskeletal: Positive for joint pain and joint swelling. Negative for gout.   Gastrointestinal: Positive for heartburn. Negative for constipation and diarrhea.   Genitourinary: Negative for nocturia.   Neurological: Negative for headaches and seizures.   Psychiatric/Behavioral: Negative for depression and substance abuse. The patient is not nervous/anxious.    Allergic/Immunologic: Positive for environmental allergies.       Objective:   Physical Exam:   General: AAOx3.  No acute distress  Vascular:  Pulses intact and equal bilaterally.  Capillary refill less than 3 seconds and equal bilaterally  Neurologic:  Pinprick and soft touch intact and equal bilaterally  Integment:  Incision well approximated and well healed.  Extremity:  Ankle/foot:  Dorsiflexion/plantar flexion equal bilaterally.  Inversion/eversion near equal bilaterally.  No swelling.  Nontender with palpation Achilles tendon right heel.  Achilles palpable throughout full distribution right heel.  Able to do single heel raise right lower extremity   Nontender with motion.  Radiography:   No new x-rays done today.       Assessment:       Impression:      1. S/P Achilles tendon repair, right heel                Plan:       1.  Discussed physical examination with the patient. Umberto understands that he has healed his Achilles rupture and at this point can return to full activities.    2. Return to work without restrictions.    3. If he has any minor pain can be treated with over-the-counter medications dosed per box instructions.  4. Follow up p.r.n...

## 2022-12-09 ENCOUNTER — OFFICE VISIT (OUTPATIENT)
Dept: FAMILY MEDICINE | Facility: CLINIC | Age: 52
End: 2022-12-09
Payer: COMMERCIAL

## 2022-12-09 VITALS
DIASTOLIC BLOOD PRESSURE: 76 MMHG | WEIGHT: 223.19 LBS | HEART RATE: 81 BPM | HEIGHT: 70 IN | SYSTOLIC BLOOD PRESSURE: 130 MMHG | OXYGEN SATURATION: 97 % | BODY MASS INDEX: 31.95 KG/M2 | RESPIRATION RATE: 18 BRPM

## 2022-12-09 DIAGNOSIS — Z23 NEED FOR VACCINATION: ICD-10-CM

## 2022-12-09 DIAGNOSIS — E11.9 TYPE 2 DIABETES MELLITUS WITHOUT COMPLICATION, WITHOUT LONG-TERM CURRENT USE OF INSULIN: Primary | ICD-10-CM

## 2022-12-09 PROCEDURE — 4010F PR ACE/ARB THEARPY RXD/TAKEN: ICD-10-PCS | Mod: CPTII,S$GLB,, | Performed by: FAMILY MEDICINE

## 2022-12-09 PROCEDURE — 99999 PR PBB SHADOW E&M-EST. PATIENT-LVL IV: ICD-10-PCS | Mod: PBBFAC,,, | Performed by: FAMILY MEDICINE

## 2022-12-09 PROCEDURE — 3066F NEPHROPATHY DOC TX: CPT | Mod: CPTII,S$GLB,, | Performed by: FAMILY MEDICINE

## 2022-12-09 PROCEDURE — 3075F PR MOST RECENT SYSTOLIC BLOOD PRESS GE 130-139MM HG: ICD-10-PCS | Mod: CPTII,S$GLB,, | Performed by: FAMILY MEDICINE

## 2022-12-09 PROCEDURE — 3008F BODY MASS INDEX DOCD: CPT | Mod: CPTII,S$GLB,, | Performed by: FAMILY MEDICINE

## 2022-12-09 PROCEDURE — 90686 FLU VACCINE (QUAD) GREATER THAN OR EQUAL TO 3YO PRESERVATIVE FREE IM: ICD-10-PCS | Mod: S$GLB,,, | Performed by: FAMILY MEDICINE

## 2022-12-09 PROCEDURE — 1160F RVW MEDS BY RX/DR IN RCRD: CPT | Mod: CPTII,S$GLB,, | Performed by: FAMILY MEDICINE

## 2022-12-09 PROCEDURE — 3066F PR DOCUMENTATION OF TREATMENT FOR NEPHROPATHY: ICD-10-PCS | Mod: CPTII,S$GLB,, | Performed by: FAMILY MEDICINE

## 2022-12-09 PROCEDURE — 1159F MED LIST DOCD IN RCRD: CPT | Mod: CPTII,S$GLB,, | Performed by: FAMILY MEDICINE

## 2022-12-09 PROCEDURE — 3078F DIAST BP <80 MM HG: CPT | Mod: CPTII,S$GLB,, | Performed by: FAMILY MEDICINE

## 2022-12-09 PROCEDURE — 99999 PR PBB SHADOW E&M-EST. PATIENT-LVL IV: CPT | Mod: PBBFAC,,, | Performed by: FAMILY MEDICINE

## 2022-12-09 PROCEDURE — 99213 PR OFFICE/OUTPT VISIT, EST, LEVL III, 20-29 MIN: ICD-10-PCS | Mod: 25,S$GLB,, | Performed by: FAMILY MEDICINE

## 2022-12-09 PROCEDURE — 4010F ACE/ARB THERAPY RXD/TAKEN: CPT | Mod: CPTII,S$GLB,, | Performed by: FAMILY MEDICINE

## 2022-12-09 PROCEDURE — 99213 OFFICE O/P EST LOW 20 MIN: CPT | Mod: 25,S$GLB,, | Performed by: FAMILY MEDICINE

## 2022-12-09 PROCEDURE — 3051F PR MOST RECENT HEMOGLOBIN A1C LEVEL 7.0 - < 8.0%: ICD-10-PCS | Mod: CPTII,S$GLB,, | Performed by: FAMILY MEDICINE

## 2022-12-09 PROCEDURE — 1159F PR MEDICATION LIST DOCUMENTED IN MEDICAL RECORD: ICD-10-PCS | Mod: CPTII,S$GLB,, | Performed by: FAMILY MEDICINE

## 2022-12-09 PROCEDURE — 3051F HG A1C>EQUAL 7.0%<8.0%: CPT | Mod: CPTII,S$GLB,, | Performed by: FAMILY MEDICINE

## 2022-12-09 PROCEDURE — 90471 IMMUNIZATION ADMIN: CPT | Mod: S$GLB,,, | Performed by: FAMILY MEDICINE

## 2022-12-09 PROCEDURE — 3061F NEG MICROALBUMINURIA REV: CPT | Mod: CPTII,S$GLB,, | Performed by: FAMILY MEDICINE

## 2022-12-09 PROCEDURE — 3078F PR MOST RECENT DIASTOLIC BLOOD PRESSURE < 80 MM HG: ICD-10-PCS | Mod: CPTII,S$GLB,, | Performed by: FAMILY MEDICINE

## 2022-12-09 PROCEDURE — 3061F PR NEG MICROALBUMINURIA RESULT DOCUMENTED/REVIEW: ICD-10-PCS | Mod: CPTII,S$GLB,, | Performed by: FAMILY MEDICINE

## 2022-12-09 PROCEDURE — 90686 IIV4 VACC NO PRSV 0.5 ML IM: CPT | Mod: S$GLB,,, | Performed by: FAMILY MEDICINE

## 2022-12-09 PROCEDURE — 3075F SYST BP GE 130 - 139MM HG: CPT | Mod: CPTII,S$GLB,, | Performed by: FAMILY MEDICINE

## 2022-12-09 PROCEDURE — 3008F PR BODY MASS INDEX (BMI) DOCUMENTED: ICD-10-PCS | Mod: CPTII,S$GLB,, | Performed by: FAMILY MEDICINE

## 2022-12-09 PROCEDURE — 90471 FLU VACCINE (QUAD) GREATER THAN OR EQUAL TO 3YO PRESERVATIVE FREE IM: ICD-10-PCS | Mod: S$GLB,,, | Performed by: FAMILY MEDICINE

## 2022-12-09 PROCEDURE — 1160F PR REVIEW ALL MEDS BY PRESCRIBER/CLIN PHARMACIST DOCUMENTED: ICD-10-PCS | Mod: CPTII,S$GLB,, | Performed by: FAMILY MEDICINE

## 2022-12-09 NOTE — PROGRESS NOTES
" Patient ID: Umberto Okeefe is a 52 y.o. male.    Chief Complaint: Follow-up (BW review)      Reviewed family, medical, surgical, and social history.    No cp/sob  No change in mental status  No fever  No asymmetrical limb swelling    Objective:      /76 (BP Location: Right arm, Patient Position: Sitting, BP Method: Medium (Manual))   Pulse 81   Resp 18   Ht 5' 10" (1.778 m)   Wt 101.2 kg (223 lb 3.2 oz)   SpO2 97%   BMI 32.03 kg/m²   RRR, CTAB, s/nt/nd, no c/c/e, non-toxic appearing, no focal weakness  Assessment:       1. Type 2 diabetes mellitus without complication, without long-term current use of insulin    2. Need for vaccination        Plan:       Type 2 diabetes mellitus without complication, without long-term current use of insulin  -     Lipid Panel; Future; Expected date: 03/09/2023  -     CBC Auto Differential; Future; Expected date: 03/09/2023  -     Comprehensive Metabolic Panel; Future; Expected date: 03/09/2023  -     TSH; Future; Expected date: 03/09/2023  -     Microalbumin/Creatinine Ratio, Urine; Future; Expected date: 03/09/2023  -     Hemoglobin A1C; Future; Expected date: 03/09/2023    Need for vaccination  -     Influenza - Quadrivalent *Preferred* (6 months+) (PF)            Continue current medicines, any changes noted above  Doing well  Controlled  Recheck blood work in 3 months  Labs, radiology studies, and procedures/notes from the last 3 months were reviewed.    Risks, benefits, and side effects were discussed with the patient. All questions were answered to the fullest satisfaction of the patient, and pt verbalized understanding and agreement to treatment plan. Pt was to call with any new or worsening symptoms, or present to the ER.      "

## 2023-02-14 ENCOUNTER — OFFICE VISIT (OUTPATIENT)
Dept: PODIATRY | Facility: CLINIC | Age: 53
End: 2023-02-14
Payer: COMMERCIAL

## 2023-02-14 VITALS
DIASTOLIC BLOOD PRESSURE: 85 MMHG | WEIGHT: 222.19 LBS | SYSTOLIC BLOOD PRESSURE: 127 MMHG | RESPIRATION RATE: 16 BRPM | HEART RATE: 111 BPM | HEIGHT: 70 IN | BODY MASS INDEX: 31.81 KG/M2

## 2023-02-14 DIAGNOSIS — E11.49 TYPE II DIABETES MELLITUS WITH NEUROLOGICAL MANIFESTATIONS: ICD-10-CM

## 2023-02-14 DIAGNOSIS — E11.65 UNCONTROLLED TYPE 2 DIABETES MELLITUS WITH HYPERGLYCEMIA: ICD-10-CM

## 2023-02-14 DIAGNOSIS — E11.9 COMPREHENSIVE DIABETIC FOOT EXAMINATION, TYPE 2 DM, ENCOUNTER FOR: Primary | ICD-10-CM

## 2023-02-14 DIAGNOSIS — G57.93 NEUROPATHIC PAIN OF BOTH FEET: ICD-10-CM

## 2023-02-14 PROCEDURE — 1159F MED LIST DOCD IN RCRD: CPT | Mod: CPTII,S$GLB,, | Performed by: PODIATRIST

## 2023-02-14 PROCEDURE — 99999 PR PBB SHADOW E&M-EST. PATIENT-LVL IV: ICD-10-PCS | Mod: PBBFAC,,, | Performed by: PODIATRIST

## 2023-02-14 PROCEDURE — 1159F PR MEDICATION LIST DOCUMENTED IN MEDICAL RECORD: ICD-10-PCS | Mod: CPTII,S$GLB,, | Performed by: PODIATRIST

## 2023-02-14 PROCEDURE — 3008F PR BODY MASS INDEX (BMI) DOCUMENTED: ICD-10-PCS | Mod: CPTII,S$GLB,, | Performed by: PODIATRIST

## 2023-02-14 PROCEDURE — 3079F DIAST BP 80-89 MM HG: CPT | Mod: CPTII,S$GLB,, | Performed by: PODIATRIST

## 2023-02-14 PROCEDURE — 3008F BODY MASS INDEX DOCD: CPT | Mod: CPTII,S$GLB,, | Performed by: PODIATRIST

## 2023-02-14 PROCEDURE — 99214 PR OFFICE/OUTPT VISIT, EST, LEVL IV, 30-39 MIN: ICD-10-PCS | Mod: S$GLB,,, | Performed by: PODIATRIST

## 2023-02-14 PROCEDURE — 99999 PR PBB SHADOW E&M-EST. PATIENT-LVL IV: CPT | Mod: PBBFAC,,, | Performed by: PODIATRIST

## 2023-02-14 PROCEDURE — 1160F RVW MEDS BY RX/DR IN RCRD: CPT | Mod: CPTII,S$GLB,, | Performed by: PODIATRIST

## 2023-02-14 PROCEDURE — 3074F PR MOST RECENT SYSTOLIC BLOOD PRESSURE < 130 MM HG: ICD-10-PCS | Mod: CPTII,S$GLB,, | Performed by: PODIATRIST

## 2023-02-14 PROCEDURE — 99214 OFFICE O/P EST MOD 30 MIN: CPT | Mod: S$GLB,,, | Performed by: PODIATRIST

## 2023-02-14 PROCEDURE — 3079F PR MOST RECENT DIASTOLIC BLOOD PRESSURE 80-89 MM HG: ICD-10-PCS | Mod: CPTII,S$GLB,, | Performed by: PODIATRIST

## 2023-02-14 PROCEDURE — 1160F PR REVIEW ALL MEDS BY PRESCRIBER/CLIN PHARMACIST DOCUMENTED: ICD-10-PCS | Mod: CPTII,S$GLB,, | Performed by: PODIATRIST

## 2023-02-14 PROCEDURE — 3074F SYST BP LT 130 MM HG: CPT | Mod: CPTII,S$GLB,, | Performed by: PODIATRIST

## 2023-02-14 RX ORDER — GABAPENTIN 800 MG/1
800 TABLET ORAL 3 TIMES DAILY
Qty: 90 TABLET | Refills: 2 | Status: SHIPPED | OUTPATIENT
Start: 2023-02-14 | End: 2023-05-15

## 2023-02-14 NOTE — PROGRESS NOTES
"Subjective:       Patient ID: Umberto Okeefe is a 52 y.o. male.    Chief Complaint: Diabetic Foot Exam, Foot Pain, and Medication Refill  Patient presents for annual diabetic foot exam, type 2 diabetes with neuropathic pain in both feet, medication refills.  Patient relates his feet have been doing well as long as he takes gabapentin, has been very effective in relieving neuropathic pain in feet.  Feels diabetes has been doing well, A1c is checked every 3 months, due again in March.  Last A1c was improved compared to previous lab work, patient admits it all depends on his diet.  Reports blood glucose last night 142.  Patient relates some discomfort, weakness of the right foot due to long recovery from surgery for Achilles tendon rupture.  Pain level 2/10      Past Medical History:   Diagnosis Date    Diabetes mellitus, type 2     GERD (gastroesophageal reflux disease)     Hypertension     Neuropathy     Restless leg syndrome      Past Surgical History:   Procedure Laterality Date    KNEE SURGERY      REPAIR OF ACHILLES TENDON Right 6/9/2022    Procedure: REPAIR, TENDON, ACHILLES;  Surgeon: Andrew Lawson DO;  Location: [facility] OR;  Service: Orthopedics;  Laterality: Right;         Current Outpatient Medications   Medication Sig Dispense Refill    atorvastatin (LIPITOR) 40 MG tablet Take 1 tablet by mouth once daily 90 tablet 3    clotrimazole (LOTRIMIN) 1 % Soln Apply topically 2 (two) times daily. 30 Bottle 1    diclofenac sodium (VOLTAREN) 1 % Gel APPLY 2 GRAMS TO THE AFFECTED AREA 3 TIMES DAILY 100 g 0    dulaglutide (TRULICITY) 1.5 mg/0.5 mL pen injector INJECT 1.5 MG INTO THE SKIN EVERY 7 DAYS 12 pen 3    lisinopriL 10 MG tablet Take 1 tablet by mouth once daily 90 tablet 3    pen needle, diabetic 31 gauge x 5/16" Ndle Use as directed BID and PRN. Dx: E11.9 100 each 11    THIN LANCETS 26 gauge Misc   5    TRUE METRIX GLUCOSE METER Misc   0    TRUE METRIX GLUCOSE TEST STRIP Strp Use as directed to check blood " "glucose twice daily and PRN. Dx E11.9. 100 strip 11    gabapentin (NEURONTIN) 800 MG tablet Take 1 tablet (800 mg total) by mouth 3 (three) times daily. 90 tablet 2    insulin detemir U-100 (LEVEMIR FLEXTOUCH U-100 INSULN) 100 unit/mL (3 mL) InPn pen INJECT 47 UNITS SUBCUTANEOUSLY IN THE EVENING  Strength: 100 unit/mL (3 mL) 30 mL 11    tadalafiL (CIALIS) 10 MG tablet Take 1 tablet (10 mg total) by mouth daily as needed for Erectile Dysfunction. 30 tablet 0     No current facility-administered medications for this visit.     Review of patient's allergies indicates:   Allergen Reactions    Topamax [topiramate]      Mental agitation     Montelukast Nausea And Vomiting       Review of Systems   HENT:  Negative for congestion.    Respiratory:  Negative for cough.    Cardiovascular:  Negative for leg swelling.   Musculoskeletal:  Negative for gait problem.   All other systems reviewed and are negative.        Objective:      Vitals:    02/14/23 1422   BP: 127/85   Pulse: (!) 111   Resp: 16   Weight: 100.8 kg (222 lb 3.2 oz)   Height: 5' 10" (1.778 m)     Physical Exam  Vitals and nursing note reviewed.   Constitutional:       General: He is not in acute distress.     Appearance: Normal appearance. He is well-developed.   Cardiovascular:      Pulses:           Dorsalis pedis pulses are 2+ on the right side and 2+ on the left side.        Posterior tibial pulses are 2+ on the right side and 2+ on the left side.   Pulmonary:      Effort: Pulmonary effort is normal.   Musculoskeletal:         General: No swelling or tenderness. Normal range of motion.      Right foot: Normal range of motion.      Left foot: Normal range of motion.      Comments: Excellent range of motion, full dorsiflexion,  intact muscle strength with no pain at the Achilles tendon insertion right at today's visit   Feet:      Right foot:      Protective Sensation: 6 sites tested.  6 sites sensed.      Skin integrity: Skin integrity normal.      Left foot: "      Protective Sensation: 6 sites tested.  6 sites sensed.      Skin integrity: Skin integrity normal.      Toenail Condition: Left toenails are normal.   Skin:     Capillary Refill: Capillary refill takes 2 to 3 seconds.   Neurological:      General: No focal deficit present.      Mental Status: He is alert.      Comments: Detected monofilament all areas bilateral feet.  Painful paresthesias feet, diabetic neuropathy controlled with gabapentin    Psychiatric:         Mood and Affect: Mood normal.         Behavior: Behavior normal.         Thought Content: Thought content normal.         Judgment: Judgment normal.       Hemoglobin A1C  Component Ref Range & Units 3 mo ago   (11/16/22) 6 mo ago   (8/15/22) 9 mo ago   (5/9/22) 1 yr ago   (2/4/22)   Hemoglobin A1C 4.0 - 5.6 % 7.6 High   9.1 High   7.5 High   7.8 High        Estimated Avg Glucose 68 - 131 mg/dL 171 High   214 High   169 High   177 High                               Assessment:       1. Comprehensive diabetic foot examination, type 2 DM, encounter for    2. Type II diabetes mellitus with neurological manifestations    3. Uncontrolled type 2 diabetes mellitus with hyperglycemia    4. Neuropathic pain of both feet        Plan:         GABAPENTIN  MG T.I.D.        Comprehensive diabetic pedal exam performed  Reviewed results monofilament testing, good sensation in feet which is in excellent sign  Reviewed neuropathy at length, painful sensations caused by this condition in the feeling of numbness can occur, however it sensation is intact and patient understands control of diabetes is crucial in preventing further complications with this condition  Reviewed benefit of controled glucose/diabetes regarding potential foot problems especially neuropathy  Reviewed gabapentin, dosage and frequency, refill administered today  Reviewed labs. A1c    Reviewed diabetic education at length especially pertaining to feet  Reviewed appropriate shoes,  especially  indoors to protect feet, no flat shoes, slippers or walking in sock or bare feet.    Reviewed appropriate shoes for Achilles tendon repair right, especially appropriate tennis shoes outside of work, thick sole for shock absorption.  Reviewed arch supports, absolutely no flat shoes or walking barefoot.  Advised patient these rules apply indefinitely due to history of achilles tendon surgery  Discussed maintenance of skin and nails and potential complications.    Reviewed need for daily foot checks and instructed patient to contact the office with any area of redness or swelling which has not improved within 3 days.  Patient was in understanding and agreement with treatment plan.  I counseled the patient on their conditions, implications and medical management.  Instructed patient to contact the office with any changes, questions, concerns, worsening of symptoms.   Total face to face time 30 minutes, exam, assessment, treatment, discussion, additional time for review of chart prior to and following appointment and visit documentation, consultation and coordination of care.    Follow up 12 months    This note was created using M*Modal voice recognition software that occasionally misinterpreted phrases or words.

## 2023-02-15 DIAGNOSIS — E11.9 TYPE 2 DIABETES MELLITUS WITHOUT COMPLICATION, UNSPECIFIED WHETHER LONG TERM INSULIN USE: ICD-10-CM

## 2023-02-17 DIAGNOSIS — E11.9 TYPE 2 DIABETES MELLITUS WITHOUT COMPLICATION, WITHOUT LONG-TERM CURRENT USE OF INSULIN: ICD-10-CM

## 2023-02-17 NOTE — TELEPHONE ENCOUNTER
----- Message from Minerva Qureshi sent at 2/17/2023  3:50 PM CST -----  Contact: self  Type:  RX Refill Request    Who Called: self  Refill or New Rx:refill  RX Name and Strength:insulin detemir U-100 (LEVEMIR FLEXTOUCH U-100 INSULN) 100 unit/mL (3 mL) InPn pen  How is the patient currently taking it? (ex. 1XDay):as directed  Is this a 30 day or 90 day RX:n/a  Preferred Pharmacy with phone number:    [id number] - [place], [place] - [address]  [address]  [place] [address]  Phone: [phone] Fax: [phone]          Local or Mail Order:local  Ordering Provider:Dr. Alba  Would the patient rather a call back or a response via [facility]? call  Best Call Back Number:[phone] (home)     Additional Information: pt would like a refill on rx. Pt states he also put a request in in [facility]. Please advise and thank you.

## 2023-02-18 RX ORDER — INSULIN DETEMIR 100 [IU]/ML
INJECTION, SOLUTION SUBCUTANEOUS
Qty: 30 ML | Refills: 11 | OUTPATIENT
Start: 2023-02-18 | End: 2023-02-23 | Stop reason: SDUPTHER

## 2023-02-22 ENCOUNTER — PATIENT MESSAGE (OUTPATIENT)
Dept: ADMINISTRATIVE | Facility: HOSPITAL | Age: 53
End: 2023-02-22
Payer: COMMERCIAL

## 2023-03-02 ENCOUNTER — LAB VISIT (OUTPATIENT)
Dept: LAB | Facility: HOSPITAL | Age: 53
End: 2023-03-02
Attending: FAMILY MEDICINE
Payer: COMMERCIAL

## 2023-03-02 DIAGNOSIS — E11.9 TYPE 2 DIABETES MELLITUS WITHOUT COMPLICATION, WITHOUT LONG-TERM CURRENT USE OF INSULIN: ICD-10-CM

## 2023-03-02 LAB
ALBUMIN SERPL BCP-MCNC: 4.2 G/DL (ref 3.5–5.2)
ALBUMIN/CREAT UR: 44.5 UG/MG (ref 0–30)
ALP SERPL-CCNC: 82 U/L (ref 55–135)
ALT SERPL W/O P-5'-P-CCNC: 34 U/L (ref 10–44)
ANION GAP SERPL CALC-SCNC: 11 MMOL/L (ref 8–16)
AST SERPL-CCNC: 23 U/L (ref 10–40)
BASOPHILS # BLD AUTO: 0.04 K/UL (ref 0–0.2)
BASOPHILS NFR BLD: 0.7 % (ref 0–1.9)
BILIRUB SERPL-MCNC: 0.9 MG/DL (ref 0.1–1)
BUN SERPL-MCNC: 16 MG/DL (ref 6–20)
CALCIUM SERPL-MCNC: 9.6 MG/DL (ref 8.7–10.5)
CHLORIDE SERPL-SCNC: 103 MMOL/L (ref 95–110)
CHOLEST SERPL-MCNC: 148 MG/DL (ref 120–199)
CHOLEST/HDLC SERPL: 4.6 {RATIO} (ref 2–5)
CO2 SERPL-SCNC: 26 MMOL/L (ref 23–29)
CREAT SERPL-MCNC: 1.2 MG/DL (ref 0.5–1.4)
CREAT UR-MCNC: 119 MG/DL (ref 23–375)
DIFFERENTIAL METHOD: ABNORMAL
EOSINOPHIL # BLD AUTO: 0.2 K/UL (ref 0–0.5)
EOSINOPHIL NFR BLD: 3.5 % (ref 0–8)
ERYTHROCYTE [DISTWIDTH] IN BLOOD BY AUTOMATED COUNT: 11.9 % (ref 11.5–14.5)
EST. GFR  (NO RACE VARIABLE): >60 ML/MIN/1.73 M^2
ESTIMATED AVG GLUCOSE: 209 MG/DL (ref 68–131)
GLUCOSE SERPL-MCNC: 144 MG/DL (ref 70–110)
HBA1C MFR BLD: 8.9 % (ref 4–5.6)
HCT VFR BLD AUTO: 46.2 % (ref 40–54)
HDLC SERPL-MCNC: 32 MG/DL (ref 40–75)
HDLC SERPL: 21.6 % (ref 20–50)
HGB BLD-MCNC: 15.8 G/DL (ref 14–18)
IMM GRANULOCYTES # BLD AUTO: 0.01 K/UL (ref 0–0.04)
IMM GRANULOCYTES NFR BLD AUTO: 0.2 % (ref 0–0.5)
LDLC SERPL CALC-MCNC: 94.4 MG/DL (ref 63–159)
LYMPHOCYTES # BLD AUTO: 1.4 K/UL (ref 1–4.8)
LYMPHOCYTES NFR BLD: 23.8 % (ref 18–48)
MCH RBC QN AUTO: 30.3 PG (ref 27–31)
MCHC RBC AUTO-ENTMCNC: 34.2 G/DL (ref 32–36)
MCV RBC AUTO: 89 FL (ref 82–98)
MICROALBUMIN UR DL<=1MG/L-MCNC: 53 UG/ML
MONOCYTES # BLD AUTO: 0.6 K/UL (ref 0.3–1)
MONOCYTES NFR BLD: 10.9 % (ref 4–15)
NEUTROPHILS # BLD AUTO: 3.5 K/UL (ref 1.8–7.7)
NEUTROPHILS NFR BLD: 60.9 % (ref 38–73)
NONHDLC SERPL-MCNC: 116 MG/DL
NRBC BLD-RTO: 0 /100 WBC
PLATELET # BLD AUTO: 341 K/UL (ref 150–450)
PMV BLD AUTO: 9 FL (ref 9.2–12.9)
POTASSIUM SERPL-SCNC: 4.3 MMOL/L (ref 3.5–5.1)
PROT SERPL-MCNC: 7.7 G/DL (ref 6–8.4)
RBC # BLD AUTO: 5.21 M/UL (ref 4.6–6.2)
SODIUM SERPL-SCNC: 140 MMOL/L (ref 136–145)
TRIGL SERPL-MCNC: 108 MG/DL (ref 30–150)
TSH SERPL DL<=0.005 MIU/L-ACNC: 1.17 UIU/ML (ref 0.4–4)
WBC # BLD AUTO: 5.68 K/UL (ref 3.9–12.7)

## 2023-03-02 PROCEDURE — 85025 COMPLETE CBC W/AUTO DIFF WBC: CPT | Performed by: FAMILY MEDICINE

## 2023-03-02 PROCEDURE — 83036 HEMOGLOBIN GLYCOSYLATED A1C: CPT | Performed by: FAMILY MEDICINE

## 2023-03-02 PROCEDURE — 36415 COLL VENOUS BLD VENIPUNCTURE: CPT | Performed by: FAMILY MEDICINE

## 2023-03-02 PROCEDURE — 82570 ASSAY OF URINE CREATININE: CPT | Performed by: FAMILY MEDICINE

## 2023-03-02 PROCEDURE — 80053 COMPREHEN METABOLIC PANEL: CPT | Performed by: FAMILY MEDICINE

## 2023-03-02 PROCEDURE — 80061 LIPID PANEL: CPT | Performed by: FAMILY MEDICINE

## 2023-03-02 PROCEDURE — 84443 ASSAY THYROID STIM HORMONE: CPT | Performed by: FAMILY MEDICINE

## 2023-03-10 ENCOUNTER — OFFICE VISIT (OUTPATIENT)
Dept: FAMILY MEDICINE | Facility: CLINIC | Age: 53
End: 2023-03-10
Payer: COMMERCIAL

## 2023-03-10 VITALS
SYSTOLIC BLOOD PRESSURE: 130 MMHG | DIASTOLIC BLOOD PRESSURE: 82 MMHG | RESPIRATION RATE: 14 BRPM | BODY MASS INDEX: 32.66 KG/M2 | HEIGHT: 70 IN | OXYGEN SATURATION: 97 % | HEART RATE: 82 BPM | WEIGHT: 228.13 LBS

## 2023-03-10 DIAGNOSIS — I10 ESSENTIAL HYPERTENSION: ICD-10-CM

## 2023-03-10 DIAGNOSIS — E11.9 TYPE 2 DIABETES MELLITUS WITHOUT COMPLICATION, WITHOUT LONG-TERM CURRENT USE OF INSULIN: Primary | ICD-10-CM

## 2023-03-10 DIAGNOSIS — M65.311 TRIGGER FINGER OF RIGHT THUMB: ICD-10-CM

## 2023-03-10 PROCEDURE — 3052F HG A1C>EQUAL 8.0%<EQUAL 9.0%: CPT | Mod: CPTII,S$GLB,, | Performed by: FAMILY MEDICINE

## 2023-03-10 PROCEDURE — 1160F PR REVIEW ALL MEDS BY PRESCRIBER/CLIN PHARMACIST DOCUMENTED: ICD-10-PCS | Mod: CPTII,S$GLB,, | Performed by: FAMILY MEDICINE

## 2023-03-10 PROCEDURE — 3066F NEPHROPATHY DOC TX: CPT | Mod: CPTII,S$GLB,, | Performed by: FAMILY MEDICINE

## 2023-03-10 PROCEDURE — 1159F MED LIST DOCD IN RCRD: CPT | Mod: CPTII,S$GLB,, | Performed by: FAMILY MEDICINE

## 2023-03-10 PROCEDURE — 3075F PR MOST RECENT SYSTOLIC BLOOD PRESS GE 130-139MM HG: ICD-10-PCS | Mod: CPTII,S$GLB,, | Performed by: FAMILY MEDICINE

## 2023-03-10 PROCEDURE — 3079F PR MOST RECENT DIASTOLIC BLOOD PRESSURE 80-89 MM HG: ICD-10-PCS | Mod: CPTII,S$GLB,, | Performed by: FAMILY MEDICINE

## 2023-03-10 PROCEDURE — 99214 PR OFFICE/OUTPT VISIT, EST, LEVL IV, 30-39 MIN: ICD-10-PCS | Mod: S$GLB,,, | Performed by: FAMILY MEDICINE

## 2023-03-10 PROCEDURE — 99999 PR PBB SHADOW E&M-EST. PATIENT-LVL IV: CPT | Mod: PBBFAC,,, | Performed by: FAMILY MEDICINE

## 2023-03-10 PROCEDURE — 3079F DIAST BP 80-89 MM HG: CPT | Mod: CPTII,S$GLB,, | Performed by: FAMILY MEDICINE

## 2023-03-10 PROCEDURE — 99999 PR PBB SHADOW E&M-EST. PATIENT-LVL IV: ICD-10-PCS | Mod: PBBFAC,,, | Performed by: FAMILY MEDICINE

## 2023-03-10 PROCEDURE — 4010F ACE/ARB THERAPY RXD/TAKEN: CPT | Mod: CPTII,S$GLB,, | Performed by: FAMILY MEDICINE

## 2023-03-10 PROCEDURE — 3060F POS MICROALBUMINURIA REV: CPT | Mod: CPTII,S$GLB,, | Performed by: FAMILY MEDICINE

## 2023-03-10 PROCEDURE — 1160F RVW MEDS BY RX/DR IN RCRD: CPT | Mod: CPTII,S$GLB,, | Performed by: FAMILY MEDICINE

## 2023-03-10 PROCEDURE — 3052F PR MOST RECENT HEMOGLOBIN A1C LEVEL 8.0 - < 9.0%: ICD-10-PCS | Mod: CPTII,S$GLB,, | Performed by: FAMILY MEDICINE

## 2023-03-10 PROCEDURE — 3008F BODY MASS INDEX DOCD: CPT | Mod: CPTII,S$GLB,, | Performed by: FAMILY MEDICINE

## 2023-03-10 PROCEDURE — 3066F PR DOCUMENTATION OF TREATMENT FOR NEPHROPATHY: ICD-10-PCS | Mod: CPTII,S$GLB,, | Performed by: FAMILY MEDICINE

## 2023-03-10 PROCEDURE — 1159F PR MEDICATION LIST DOCUMENTED IN MEDICAL RECORD: ICD-10-PCS | Mod: CPTII,S$GLB,, | Performed by: FAMILY MEDICINE

## 2023-03-10 PROCEDURE — 3075F SYST BP GE 130 - 139MM HG: CPT | Mod: CPTII,S$GLB,, | Performed by: FAMILY MEDICINE

## 2023-03-10 PROCEDURE — 3008F PR BODY MASS INDEX (BMI) DOCUMENTED: ICD-10-PCS | Mod: CPTII,S$GLB,, | Performed by: FAMILY MEDICINE

## 2023-03-10 PROCEDURE — 3060F PR POS MICROALBUMINURIA RESULT DOCUMENTED/REVIEW: ICD-10-PCS | Mod: CPTII,S$GLB,, | Performed by: FAMILY MEDICINE

## 2023-03-10 PROCEDURE — 4010F PR ACE/ARB THEARPY RXD/TAKEN: ICD-10-PCS | Mod: CPTII,S$GLB,, | Performed by: FAMILY MEDICINE

## 2023-03-10 PROCEDURE — 99214 OFFICE O/P EST MOD 30 MIN: CPT | Mod: S$GLB,,, | Performed by: FAMILY MEDICINE

## 2023-03-10 NOTE — PROGRESS NOTES
" Patient ID: Umberto Okeefe is a 52 y.o. male.    Chief Complaint: Follow-up (BW review)      Reviewed family, medical, surgical, and social history.    No cp/sob  No change in mental status  No fever  No asymmetrical limb swelling    Objective:      /82 (BP Location: Left arm, Patient Position: Sitting, BP Method: Medium (Manual))   Pulse 82   Resp 14   Ht 5' 10" (1.778 m)   Wt 103.5 kg (228 lb 1.6 oz)   SpO2 97%   BMI 32.73 kg/m²   RRR, CTAB, s/nt/nd, no c/c/e, non-toxic appearing, no focal weakness  Assessment:       1. Type 2 diabetes mellitus without complication, without long-term current use of insulin    2. Essential hypertension    3. Trigger finger of right thumb        Plan:       Type 2 diabetes mellitus without complication, without long-term current use of insulin  -     Ambulatory referral/consult to Orthopedics; Future; Expected date: 03/17/2023  -     Lipid Panel; Future; Expected date: 06/10/2023  -     CBC Auto Differential; Future; Expected date: 06/10/2023  -     Comprehensive Metabolic Panel; Future; Expected date: 06/10/2023  -     TSH; Future; Expected date: 06/10/2023  -     Microalbumin/Creatinine Ratio, Urine; Future; Expected date: 06/10/2023  -     Hemoglobin A1C; Future; Expected date: 06/10/2023    Essential hypertension    Trigger finger of right thumb              Continue current medicines, any changes noted above  Patient admits to some diet changes, will check again in 3 months  Labs, radiology studies, and procedures/notes from the last 3 months were reviewed.    Risks, benefits, and side effects were discussed with the patient. All questions were answered to the fullest satisfaction of the patient, and pt verbalized understanding and agreement to treatment plan. Pt was to call with any new or worsening symptoms, or present to the ER.    "

## 2023-03-13 ENCOUNTER — TELEPHONE (OUTPATIENT)
Dept: FAMILY MEDICINE | Facility: CLINIC | Age: 53
End: 2023-03-13
Payer: COMMERCIAL

## 2023-03-13 DIAGNOSIS — E11.9 TYPE 2 DIABETES MELLITUS WITHOUT COMPLICATION, WITHOUT LONG-TERM CURRENT USE OF INSULIN: Primary | ICD-10-CM

## 2023-03-13 RX ORDER — SEMAGLUTIDE 1.34 MG/ML
0.5 INJECTION, SOLUTION SUBCUTANEOUS
Qty: 1.5 ML | Refills: 11 | Status: SHIPPED | OUTPATIENT
Start: 2023-03-13 | End: 2023-06-12 | Stop reason: SDUPTHER

## 2023-03-13 NOTE — TELEPHONE ENCOUNTER
----- Message from Andi Lind sent at 3/13/2023  9:34 AM CDT -----  Who Called: Val AM [employer] insurance          What is the reqeust in detail: Requesting call back to discuss [facility] cannot get the medication too expensive and needs prior auth for it to be covered through pt insurance.          Can the clinic reply by [facility]? No          Best Call Back Number: [phone]           Additional Information:

## 2023-03-14 ENCOUNTER — TELEPHONE (OUTPATIENT)
Dept: FAMILY MEDICINE | Facility: CLINIC | Age: 53
End: 2023-03-14
Payer: COMMERCIAL

## 2023-03-14 NOTE — TELEPHONE ENCOUNTER
I sent in ozempic to see if it would be covered instead of trulicity so we wouldn't have to pursue a PA. Was he able to get it?

## 2023-03-14 NOTE — TELEPHONE ENCOUNTER
----- Message from Rudy Pressley sent at 3/14/2023  1:22 PM CDT -----  Contact: pt spouse  Type: Needs Medical Advice  Who Called:  pt spouse    Pharmacy name and phone #:    [id number] - [place], [place] - [address]  [address]  [place] [address]  Phone: [phone] Fax: [phone]          Best Call Back Number: [phone]   Additional Information: pt spouse states his insurance needs a authorization for trulicity,  fax number [phone] , phone number [phone] [facility] pharmacy , pt would like a call back. Please advise.

## 2023-03-14 NOTE — TELEPHONE ENCOUNTER
Returned call to patient. Pt requesting PA for trulicity. However, I do not show RX sent into pharmacy. Please advise.

## 2023-03-22 DIAGNOSIS — M79.641 RIGHT HAND PAIN: Primary | ICD-10-CM

## 2023-03-28 ENCOUNTER — HOSPITAL ENCOUNTER (OUTPATIENT)
Dept: RADIOLOGY | Facility: HOSPITAL | Age: 53
Discharge: HOME | End: 2023-03-28
Attending: ORTHOPAEDIC SURGERY
Payer: COMMERCIAL

## 2023-03-28 ENCOUNTER — OFFICE VISIT (OUTPATIENT)
Dept: ORTHOPEDICS | Facility: CLINIC | Age: 53
End: 2023-03-28
Payer: COMMERCIAL

## 2023-03-28 VITALS — RESPIRATION RATE: 15 BRPM | BODY MASS INDEX: 32.67 KG/M2 | HEIGHT: 70 IN | WEIGHT: 228.19 LBS

## 2023-03-28 DIAGNOSIS — M79.644 PAIN OF RIGHT THUMB: ICD-10-CM

## 2023-03-28 DIAGNOSIS — M93.1 KIENBOCK'S DISEASE OF LUNATE BONE OF RIGHT WRIST IN ADULT: ICD-10-CM

## 2023-03-28 DIAGNOSIS — M18.11 ARTHRITIS OF CARPOMETACARPAL (CMC) JOINT OF RIGHT THUMB: ICD-10-CM

## 2023-03-28 DIAGNOSIS — E11.9 TYPE 2 DIABETES MELLITUS WITHOUT COMPLICATION, WITHOUT LONG-TERM CURRENT USE OF INSULIN: ICD-10-CM

## 2023-03-28 DIAGNOSIS — M79.641 RIGHT HAND PAIN: ICD-10-CM

## 2023-03-28 DIAGNOSIS — M65.311 TRIGGER THUMB, RIGHT THUMB: Primary | ICD-10-CM

## 2023-03-28 PROCEDURE — 3060F PR POS MICROALBUMINURIA RESULT DOCUMENTED/REVIEW: ICD-10-PCS | Mod: CPTII,S$GLB,, | Performed by: ORTHOPAEDIC SURGERY

## 2023-03-28 PROCEDURE — 1159F MED LIST DOCD IN RCRD: CPT | Mod: CPTII,S$GLB,, | Performed by: ORTHOPAEDIC SURGERY

## 2023-03-28 PROCEDURE — 99213 OFFICE O/P EST LOW 20 MIN: CPT | Mod: 25,S$GLB,, | Performed by: ORTHOPAEDIC SURGERY

## 2023-03-28 PROCEDURE — 3052F PR MOST RECENT HEMOGLOBIN A1C LEVEL 8.0 - < 9.0%: ICD-10-PCS | Mod: CPTII,S$GLB,, | Performed by: ORTHOPAEDIC SURGERY

## 2023-03-28 PROCEDURE — 4010F PR ACE/ARB THEARPY RXD/TAKEN: ICD-10-PCS | Mod: CPTII,S$GLB,, | Performed by: ORTHOPAEDIC SURGERY

## 2023-03-28 PROCEDURE — 73130 X-RAY EXAM OF HAND: CPT | Mod: 26,RT,, | Performed by: RADIOLOGY

## 2023-03-28 PROCEDURE — 99999 PR PBB SHADOW E&M-EST. PATIENT-LVL III: CPT | Mod: PBBFAC,,, | Performed by: ORTHOPAEDIC SURGERY

## 2023-03-28 PROCEDURE — 3060F POS MICROALBUMINURIA REV: CPT | Mod: CPTII,S$GLB,, | Performed by: ORTHOPAEDIC SURGERY

## 2023-03-28 PROCEDURE — 3066F PR DOCUMENTATION OF TREATMENT FOR NEPHROPATHY: ICD-10-PCS | Mod: CPTII,S$GLB,, | Performed by: ORTHOPAEDIC SURGERY

## 2023-03-28 PROCEDURE — 20550 TENDON SHEATH: ICD-10-PCS | Mod: RT,S$GLB,, | Performed by: ORTHOPAEDIC SURGERY

## 2023-03-28 PROCEDURE — 3008F PR BODY MASS INDEX (BMI) DOCUMENTED: ICD-10-PCS | Mod: CPTII,S$GLB,, | Performed by: ORTHOPAEDIC SURGERY

## 2023-03-28 PROCEDURE — 4010F ACE/ARB THERAPY RXD/TAKEN: CPT | Mod: CPTII,S$GLB,, | Performed by: ORTHOPAEDIC SURGERY

## 2023-03-28 PROCEDURE — 73130 X-RAY EXAM OF HAND: CPT | Mod: TC,PN,RT

## 2023-03-28 PROCEDURE — 99213 PR OFFICE/OUTPT VISIT, EST, LEVL III, 20-29 MIN: ICD-10-PCS | Mod: 25,S$GLB,, | Performed by: ORTHOPAEDIC SURGERY

## 2023-03-28 PROCEDURE — 73130 XR HAND COMPLETE 3 VIEW RIGHT: ICD-10-PCS | Mod: 26,RT,, | Performed by: RADIOLOGY

## 2023-03-28 PROCEDURE — 3066F NEPHROPATHY DOC TX: CPT | Mod: CPTII,S$GLB,, | Performed by: ORTHOPAEDIC SURGERY

## 2023-03-28 PROCEDURE — 99999 PR PBB SHADOW E&M-EST. PATIENT-LVL III: ICD-10-PCS | Mod: PBBFAC,,, | Performed by: ORTHOPAEDIC SURGERY

## 2023-03-28 PROCEDURE — 3052F HG A1C>EQUAL 8.0%<EQUAL 9.0%: CPT | Mod: CPTII,S$GLB,, | Performed by: ORTHOPAEDIC SURGERY

## 2023-03-28 PROCEDURE — 1159F PR MEDICATION LIST DOCUMENTED IN MEDICAL RECORD: ICD-10-PCS | Mod: CPTII,S$GLB,, | Performed by: ORTHOPAEDIC SURGERY

## 2023-03-28 PROCEDURE — 20550 NJX 1 TENDON SHEATH/LIGAMENT: CPT | Mod: RT,S$GLB,, | Performed by: ORTHOPAEDIC SURGERY

## 2023-03-28 PROCEDURE — 3008F BODY MASS INDEX DOCD: CPT | Mod: CPTII,S$GLB,, | Performed by: ORTHOPAEDIC SURGERY

## 2023-03-28 RX ORDER — TRIAMCINOLONE ACETONIDE 40 MG/ML
40 INJECTION, SUSPENSION INTRA-ARTICULAR; INTRAMUSCULAR
Status: DISCONTINUED | OUTPATIENT
Start: 2023-03-28 | End: 2023-03-28 | Stop reason: HOSPADM

## 2023-03-28 RX ADMIN — TRIAMCINOLONE ACETONIDE 40 MG: 40 INJECTION, SUSPENSION INTRA-ARTICULAR; INTRAMUSCULAR at 03:03

## 2023-03-28 NOTE — PROGRESS NOTES
Subjective:      Patient ID: Umberto Okeefe is a 53 y.o. male.    Chief Complaint: Pain of the Right Hand      HPI:  Mr. Okeefe returns today with new complaints of 3 months of catching and locking of his right thumb.  He denied trauma.  He stated his symptoms are worse in the morning.  Can feel a nodule at the base of his right thumb which is tender.  He has taken NSAIDs without help.  He has not done physical therapy, worn a brace, nor had injections.    ROS:  No new diagnosis/surgery/prescriptions since last office visit on 11/30/2022.  Constitution: Negative for chills and fever.   HENT: Negative for congestion.    Eyes: Negative for blurred vision.   Respiratory: Negative for cough.    Endocrine: Negative for polydipsia.   Hematologic/Lymphatic: Negative for adenopathy.   Skin: Negative for flushing, itching and rash.   Musculoskeletal: Positive for joint pain and joint swelling. Negative for gout.   Gastrointestinal: Positive for heartburn. Negative for constipation and diarrhea.   Genitourinary: Negative for nocturia.   Neurological: Negative for headaches and seizures.   Psychiatric/Behavioral: Negative for depression and substance abuse. The patient is not nervous/anxious.    Allergic/Immunologic: Positive for environmental allergies.       Objective:      Physical Exam:   General: AAOx3.  No acute distress  Vascular:  Pulses intact and equal bilaterally.  Capillary refill less than 3 seconds and equal bilaterally  Neurologic:  Pinprick and soft touch intact and equal bilaterally  Integment:  No ecchymosis, no errythema  Extremity:  Wrist/hand:  Pronation/supination equal bilaterally 90/85 degrees.  Dorsiflexion/volar flexion equal bilaterally 70/60 degrees.  Nontender in the anatomic snuffbox bilaterally.  Nontender at the 1st dorsal compartment bilaterally.  Finkelstein's negative bilaterally.  No swelling at the 1st dorsal compartment bilaterally.  Grind test negative bilaterally.  Nontender at the  scapholunate interval bilaterally.  Clark's test negative bilaterally.  Nontender at the DRUJ/TFCC bilaterally.  Ulnar impaction test negative bilaterally.  Wartenberg sign negative both hands.  Volar nodule palpable MCP joint right thumb.  Tender with palpation volar nodule.  Triggering right thumb.  Radiography:  Personally reviewed x-rays of the right hand completed on 03/28/2023 which showed 1st CMC arthritic changes and changes consistent with Kienbock's disease of the lunate.      Assessment:       Impression:     1. Trigger thumb, right thumb    2.   3.   4. Kienbock's disease , right wrist.    First CMC arthritis, right thumb  Right thumb pain.         Plan:       1.  Discussed physical examination and radiographic findings with the patient. Umberto understands that he has a trigger thumb of his right thumb.  Treatment alternatives and outcomes were discussed with the patient he understands he could be treated conservatively with observation, activity modification, NSAIDs, bracing, physical therapy, injections, or he could consider surgical intervention such as A1 pulley release.  He is had injections for trigger fingers in his other fingers which worked well he would like to trial injections before considering surgery.    2. Offered a steroid injection to the flexor tendon sheath of the right thumb, he elected to proceed.    3. Start doing IP taping of the right thumb, the patient was shown how to IP tape and supplies were given.    4. Take NSAIDs as tolerated allowed by PCM.    5. Follow up p.r.n. if he has not improved by next visit may consider A1 pulley release at that time.

## 2023-03-28 NOTE — PROCEDURES
Tendon Sheath    Date/Time: 3/28/2023 3:30 PM  Performed by: Andrew Lawson DO  Authorized by: Andrew Lawson, DO     Consent Done?:  Yes (Verbal)  Indications:  Pain and diagnostic evaluation  Site marked: the procedure site was marked    Timeout: prior to procedure the correct patient, procedure, and site was verified    Prep: patient was prepped and draped in usual sterile fashion      Location:  Thumb  Site:  R thumb flexor tendon sheath  Ultrasonic guidance for needle placement?: No    Needle size:  25 G  Approach:  Volar  Medications:  40 mg triamcinolone acetonide 40 mg/mL  Patient tolerance:  Patient tolerated the procedure well with no immediate complications

## 2023-04-11 ENCOUNTER — PATIENT MESSAGE (OUTPATIENT)
Dept: ADMINISTRATIVE | Facility: HOSPITAL | Age: 53
End: 2023-04-11
Payer: COMMERCIAL

## 2023-04-26 ENCOUNTER — TELEPHONE (OUTPATIENT)
Dept: FAMILY MEDICINE | Facility: CLINIC | Age: 53
End: 2023-04-26
Payer: COMMERCIAL

## 2023-04-26 NOTE — TELEPHONE ENCOUNTER
"----- Message from Nova Hodges sent at 4/26/2023  4:13 PM CDT -----  Contact: Wife  Type:   Calling to Clarify an RX    Name of Caller:  Pts Wife  Pharmacy Name:  [facility]  Prescription Name:  semaglutide (OZEMPIC) 0.25 mg or 0.5 mg(2 mg/1.5 mL) pen injector and pen needle, diabetic 31 gauge x 5/16" Ndle  What do they need to clarify?: Stated she was told by pharmacy that insurance needs authorization on both   Best Call Back Number:  ([phone] wife )  Auth # [id number] ref# [id number]  Additional Information:  Thank You      "

## 2023-04-27 ENCOUNTER — PATIENT MESSAGE (OUTPATIENT)
Dept: FAMILY MEDICINE | Facility: CLINIC | Age: 53
End: 2023-04-27
Payer: COMMERCIAL

## 2023-05-15 RX ORDER — GABAPENTIN 800 MG/1
800 TABLET ORAL 3 TIMES DAILY
Qty: 90 TABLET | Refills: 2 | Status: SHIPPED | OUTPATIENT
Start: 2023-05-15 | End: 2023-08-02

## 2023-06-05 ENCOUNTER — LAB VISIT (OUTPATIENT)
Dept: LAB | Facility: HOSPITAL | Age: 53
End: 2023-06-05
Attending: FAMILY MEDICINE
Payer: COMMERCIAL

## 2023-06-05 DIAGNOSIS — E11.9 TYPE 2 DIABETES MELLITUS WITHOUT COMPLICATION, WITHOUT LONG-TERM CURRENT USE OF INSULIN: ICD-10-CM

## 2023-06-05 LAB
ALBUMIN SERPL BCP-MCNC: 4.1 G/DL (ref 3.5–5.2)
ALBUMIN/CREAT UR: 54.9 UG/MG (ref 0–30)
ALP SERPL-CCNC: 82 U/L (ref 55–135)
ALT SERPL W/O P-5'-P-CCNC: 34 U/L (ref 10–44)
ANION GAP SERPL CALC-SCNC: 11 MMOL/L (ref 8–16)
AST SERPL-CCNC: 22 U/L (ref 10–40)
BASOPHILS # BLD AUTO: 0.05 K/UL (ref 0–0.2)
BASOPHILS NFR BLD: 0.8 % (ref 0–1.9)
BILIRUB SERPL-MCNC: 0.7 MG/DL (ref 0.1–1)
BUN SERPL-MCNC: 20 MG/DL (ref 6–20)
CALCIUM SERPL-MCNC: 9.6 MG/DL (ref 8.7–10.5)
CHLORIDE SERPL-SCNC: 100 MMOL/L (ref 95–110)
CHOLEST SERPL-MCNC: 143 MG/DL (ref 120–199)
CHOLEST/HDLC SERPL: 4.3 {RATIO} (ref 2–5)
CO2 SERPL-SCNC: 25 MMOL/L (ref 23–29)
CREAT SERPL-MCNC: 1.1 MG/DL (ref 0.5–1.4)
CREAT UR-MCNC: 113 MG/DL (ref 23–375)
DIFFERENTIAL METHOD: ABNORMAL
EOSINOPHIL # BLD AUTO: 0.2 K/UL (ref 0–0.5)
EOSINOPHIL NFR BLD: 2.6 % (ref 0–8)
ERYTHROCYTE [DISTWIDTH] IN BLOOD BY AUTOMATED COUNT: 11.9 % (ref 11.5–14.5)
EST. GFR  (NO RACE VARIABLE): >60 ML/MIN/1.73 M^2
ESTIMATED AVG GLUCOSE: 166 MG/DL (ref 68–131)
GLUCOSE SERPL-MCNC: 142 MG/DL (ref 70–110)
HBA1C MFR BLD: 7.4 % (ref 4–5.6)
HCT VFR BLD AUTO: 43.4 % (ref 40–54)
HDLC SERPL-MCNC: 33 MG/DL (ref 40–75)
HDLC SERPL: 23.1 % (ref 20–50)
HGB BLD-MCNC: 14.9 G/DL (ref 14–18)
IMM GRANULOCYTES # BLD AUTO: 0.02 K/UL (ref 0–0.04)
IMM GRANULOCYTES NFR BLD AUTO: 0.3 % (ref 0–0.5)
LDLC SERPL CALC-MCNC: 75 MG/DL (ref 63–159)
LYMPHOCYTES # BLD AUTO: 1.6 K/UL (ref 1–4.8)
LYMPHOCYTES NFR BLD: 24 % (ref 18–48)
MCH RBC QN AUTO: 30.2 PG (ref 27–31)
MCHC RBC AUTO-ENTMCNC: 34.3 G/DL (ref 32–36)
MCV RBC AUTO: 88 FL (ref 82–98)
MICROALBUMIN UR DL<=1MG/L-MCNC: 62 UG/ML
MONOCYTES # BLD AUTO: 0.8 K/UL (ref 0.3–1)
MONOCYTES NFR BLD: 12.2 % (ref 4–15)
NEUTROPHILS # BLD AUTO: 3.9 K/UL (ref 1.8–7.7)
NEUTROPHILS NFR BLD: 60.1 % (ref 38–73)
NONHDLC SERPL-MCNC: 110 MG/DL
NRBC BLD-RTO: 0 /100 WBC
PLATELET # BLD AUTO: 308 K/UL (ref 150–450)
PMV BLD AUTO: 9.1 FL (ref 9.2–12.9)
POTASSIUM SERPL-SCNC: 4 MMOL/L (ref 3.5–5.1)
PROT SERPL-MCNC: 7.6 G/DL (ref 6–8.4)
RBC # BLD AUTO: 4.94 M/UL (ref 4.6–6.2)
SODIUM SERPL-SCNC: 136 MMOL/L (ref 136–145)
TRIGL SERPL-MCNC: 175 MG/DL (ref 30–150)
TSH SERPL DL<=0.005 MIU/L-ACNC: 2.27 UIU/ML (ref 0.4–4)
WBC # BLD AUTO: 6.5 K/UL (ref 3.9–12.7)

## 2023-06-05 PROCEDURE — 82570 ASSAY OF URINE CREATININE: CPT | Performed by: FAMILY MEDICINE

## 2023-06-05 PROCEDURE — 84443 ASSAY THYROID STIM HORMONE: CPT | Performed by: FAMILY MEDICINE

## 2023-06-05 PROCEDURE — 80061 LIPID PANEL: CPT | Performed by: FAMILY MEDICINE

## 2023-06-05 PROCEDURE — 80053 COMPREHEN METABOLIC PANEL: CPT | Performed by: FAMILY MEDICINE

## 2023-06-05 PROCEDURE — 36415 COLL VENOUS BLD VENIPUNCTURE: CPT | Performed by: FAMILY MEDICINE

## 2023-06-05 PROCEDURE — 83036 HEMOGLOBIN GLYCOSYLATED A1C: CPT | Performed by: FAMILY MEDICINE

## 2023-06-05 PROCEDURE — 85025 COMPLETE CBC W/AUTO DIFF WBC: CPT | Performed by: FAMILY MEDICINE

## 2023-06-12 ENCOUNTER — OFFICE VISIT (OUTPATIENT)
Dept: FAMILY MEDICINE | Facility: CLINIC | Age: 53
End: 2023-06-12
Payer: COMMERCIAL

## 2023-06-12 VITALS
RESPIRATION RATE: 16 BRPM | WEIGHT: 221.19 LBS | OXYGEN SATURATION: 96 % | DIASTOLIC BLOOD PRESSURE: 72 MMHG | HEIGHT: 70 IN | BODY MASS INDEX: 31.67 KG/M2 | HEART RATE: 91 BPM | SYSTOLIC BLOOD PRESSURE: 124 MMHG

## 2023-06-12 DIAGNOSIS — E11.9 TYPE 2 DIABETES MELLITUS WITHOUT COMPLICATION, WITHOUT LONG-TERM CURRENT USE OF INSULIN: Primary | ICD-10-CM

## 2023-06-12 DIAGNOSIS — Z12.5 PROSTATE CANCER SCREENING: ICD-10-CM

## 2023-06-12 PROCEDURE — 3078F PR MOST RECENT DIASTOLIC BLOOD PRESSURE < 80 MM HG: ICD-10-PCS | Mod: CPTII,S$GLB,, | Performed by: FAMILY MEDICINE

## 2023-06-12 PROCEDURE — 3051F HG A1C>EQUAL 7.0%<8.0%: CPT | Mod: CPTII,S$GLB,, | Performed by: FAMILY MEDICINE

## 2023-06-12 PROCEDURE — 1160F PR REVIEW ALL MEDS BY PRESCRIBER/CLIN PHARMACIST DOCUMENTED: ICD-10-PCS | Mod: CPTII,S$GLB,, | Performed by: FAMILY MEDICINE

## 2023-06-12 PROCEDURE — 99213 PR OFFICE/OUTPT VISIT, EST, LEVL III, 20-29 MIN: ICD-10-PCS | Mod: S$GLB,,, | Performed by: FAMILY MEDICINE

## 2023-06-12 PROCEDURE — 99999 PR PBB SHADOW E&M-EST. PATIENT-LVL IV: ICD-10-PCS | Mod: PBBFAC,,, | Performed by: FAMILY MEDICINE

## 2023-06-12 PROCEDURE — 3074F SYST BP LT 130 MM HG: CPT | Mod: CPTII,S$GLB,, | Performed by: FAMILY MEDICINE

## 2023-06-12 PROCEDURE — 3066F NEPHROPATHY DOC TX: CPT | Mod: CPTII,S$GLB,, | Performed by: FAMILY MEDICINE

## 2023-06-12 PROCEDURE — 1159F MED LIST DOCD IN RCRD: CPT | Mod: CPTII,S$GLB,, | Performed by: FAMILY MEDICINE

## 2023-06-12 PROCEDURE — 3060F PR POS MICROALBUMINURIA RESULT DOCUMENTED/REVIEW: ICD-10-PCS | Mod: CPTII,S$GLB,, | Performed by: FAMILY MEDICINE

## 2023-06-12 PROCEDURE — 99999 PR PBB SHADOW E&M-EST. PATIENT-LVL IV: CPT | Mod: PBBFAC,,, | Performed by: FAMILY MEDICINE

## 2023-06-12 PROCEDURE — 99213 OFFICE O/P EST LOW 20 MIN: CPT | Mod: S$GLB,,, | Performed by: FAMILY MEDICINE

## 2023-06-12 PROCEDURE — 3066F PR DOCUMENTATION OF TREATMENT FOR NEPHROPATHY: ICD-10-PCS | Mod: CPTII,S$GLB,, | Performed by: FAMILY MEDICINE

## 2023-06-12 PROCEDURE — 3060F POS MICROALBUMINURIA REV: CPT | Mod: CPTII,S$GLB,, | Performed by: FAMILY MEDICINE

## 2023-06-12 PROCEDURE — 3074F PR MOST RECENT SYSTOLIC BLOOD PRESSURE < 130 MM HG: ICD-10-PCS | Mod: CPTII,S$GLB,, | Performed by: FAMILY MEDICINE

## 2023-06-12 PROCEDURE — 1159F PR MEDICATION LIST DOCUMENTED IN MEDICAL RECORD: ICD-10-PCS | Mod: CPTII,S$GLB,, | Performed by: FAMILY MEDICINE

## 2023-06-12 PROCEDURE — 4010F PR ACE/ARB THEARPY RXD/TAKEN: ICD-10-PCS | Mod: CPTII,S$GLB,, | Performed by: FAMILY MEDICINE

## 2023-06-12 PROCEDURE — 3008F BODY MASS INDEX DOCD: CPT | Mod: CPTII,S$GLB,, | Performed by: FAMILY MEDICINE

## 2023-06-12 PROCEDURE — 1160F RVW MEDS BY RX/DR IN RCRD: CPT | Mod: CPTII,S$GLB,, | Performed by: FAMILY MEDICINE

## 2023-06-12 PROCEDURE — 3008F PR BODY MASS INDEX (BMI) DOCUMENTED: ICD-10-PCS | Mod: CPTII,S$GLB,, | Performed by: FAMILY MEDICINE

## 2023-06-12 PROCEDURE — 3078F DIAST BP <80 MM HG: CPT | Mod: CPTII,S$GLB,, | Performed by: FAMILY MEDICINE

## 2023-06-12 PROCEDURE — 4010F ACE/ARB THERAPY RXD/TAKEN: CPT | Mod: CPTII,S$GLB,, | Performed by: FAMILY MEDICINE

## 2023-06-12 PROCEDURE — 3051F PR MOST RECENT HEMOGLOBIN A1C LEVEL 7.0 - < 8.0%: ICD-10-PCS | Mod: CPTII,S$GLB,, | Performed by: FAMILY MEDICINE

## 2023-06-12 RX ORDER — SEMAGLUTIDE 1.34 MG/ML
0.5 INJECTION, SOLUTION SUBCUTANEOUS
Qty: 1.5 ML | Refills: 11 | Status: SHIPPED | OUTPATIENT
Start: 2023-06-12 | End: 2023-06-12

## 2023-06-12 NOTE — PROGRESS NOTES
" Patient ID: Umberto Okeefe is a 53 y.o. male.    Chief Complaint: Follow-up (BW review) and Medication Refill      Reviewed family, medical, surgical, and social history.    No cp/sob  No change in mental status  No fever  No asymmetrical limb swelling    Objective:      /72 (BP Location: Left arm, Patient Position: Sitting, BP Method: Medium (Manual))   Pulse 91   Resp 16   Ht 5' 10" (1.778 m)   Wt 100.3 kg (221 lb 3.2 oz)   SpO2 96%   BMI 31.74 kg/m²   RRR, CTAB, s/nt/nd, no c/c/e, non-toxic appearing, no focal weakness  Assessment:       1. Type 2 diabetes mellitus without complication, without long-term current use of insulin    2. Prostate cancer screening        Plan:       Type 2 diabetes mellitus without complication, without long-term current use of insulin  -     Discontinue: semaglutide (OZEMPIC) 0.25 mg or 0.5 mg(2 mg/1.5 mL) pen injector; Inject 0.5 mg into the skin every 7 days.  Dispense: 1.5 mL; Refill: 11  -     semaglutide (OZEMPIC) 1 mg/dose (4 mg/3 mL); Inject 1 mg into the skin every 7 days.  Dispense: 3 mL; Refill: 11  -     Lipid Panel; Future; Expected date: 09/12/2023  -     CBC Auto Differential; Future; Expected date: 09/12/2023  -     Comprehensive Metabolic Panel; Future; Expected date: 09/12/2023  -     TSH; Future; Expected date: 09/12/2023  -     Microalbumin/Creatinine Ratio, Urine; Future; Expected date: 09/12/2023  -     Hemoglobin A1C; Future; Expected date: 09/12/2023    Prostate cancer screening  -     PSA, Screening; Future; Expected date: 06/12/2023              Continue current medicines, any changes noted above  Doing well  Blood work in 3 months  Follow up in 3 months    Risks, benefits, and side effects were discussed with the patient. All questions were answered to the fullest satisfaction of the patient, and pt verbalized understanding and agreement to treatment plan. Pt was to call with any new or worsening symptoms, or present to the ER.    "

## 2023-08-02 RX ORDER — GABAPENTIN 800 MG/1
800 TABLET ORAL 3 TIMES DAILY
Qty: 90 TABLET | Refills: 2 | Status: SHIPPED | OUTPATIENT
Start: 2023-08-02 | End: 2023-11-06

## 2023-09-08 ENCOUNTER — LAB VISIT (OUTPATIENT)
Dept: LAB | Facility: HOSPITAL | Age: 53
End: 2023-09-08
Attending: FAMILY MEDICINE
Payer: COMMERCIAL

## 2023-09-08 DIAGNOSIS — E11.9 TYPE 2 DIABETES MELLITUS WITHOUT COMPLICATION, WITHOUT LONG-TERM CURRENT USE OF INSULIN: ICD-10-CM

## 2023-09-08 DIAGNOSIS — Z12.5 PROSTATE CANCER SCREENING: ICD-10-CM

## 2023-09-08 LAB
ALBUMIN SERPL BCP-MCNC: 4.2 G/DL (ref 3.5–5.2)
ALBUMIN/CREAT UR: 56.3 UG/MG (ref 0–30)
ALP SERPL-CCNC: 80 U/L (ref 55–135)
ALT SERPL W/O P-5'-P-CCNC: 29 U/L (ref 10–44)
ANION GAP SERPL CALC-SCNC: 10 MMOL/L (ref 8–16)
AST SERPL-CCNC: 21 U/L (ref 10–40)
BASOPHILS # BLD AUTO: 0.06 K/UL (ref 0–0.2)
BASOPHILS NFR BLD: 0.9 % (ref 0–1.9)
BILIRUB SERPL-MCNC: 0.5 MG/DL (ref 0.1–1)
BUN SERPL-MCNC: 23 MG/DL (ref 6–20)
CALCIUM SERPL-MCNC: 9.6 MG/DL (ref 8.7–10.5)
CHLORIDE SERPL-SCNC: 104 MMOL/L (ref 95–110)
CHOLEST SERPL-MCNC: 169 MG/DL (ref 120–199)
CHOLEST/HDLC SERPL: 5.3 {RATIO} (ref 2–5)
CO2 SERPL-SCNC: 24 MMOL/L (ref 23–29)
COMPLEXED PSA SERPL-MCNC: 0.33 NG/ML (ref 0–4)
CREAT SERPL-MCNC: 1.2 MG/DL (ref 0.5–1.4)
CREAT UR-MCNC: 142 MG/DL (ref 23–375)
DIFFERENTIAL METHOD: ABNORMAL
EOSINOPHIL # BLD AUTO: 0.2 K/UL (ref 0–0.5)
EOSINOPHIL NFR BLD: 2.3 % (ref 0–8)
ERYTHROCYTE [DISTWIDTH] IN BLOOD BY AUTOMATED COUNT: 12.3 % (ref 11.5–14.5)
EST. GFR  (NO RACE VARIABLE): >60 ML/MIN/1.73 M^2
ESTIMATED AVG GLUCOSE: 154 MG/DL (ref 68–131)
GLUCOSE SERPL-MCNC: 153 MG/DL (ref 70–110)
HBA1C MFR BLD: 7 % (ref 4–5.6)
HCT VFR BLD AUTO: 46 % (ref 40–54)
HDLC SERPL-MCNC: 32 MG/DL (ref 40–75)
HDLC SERPL: 18.9 % (ref 20–50)
HGB BLD-MCNC: 15.8 G/DL (ref 14–18)
IMM GRANULOCYTES # BLD AUTO: 0.01 K/UL (ref 0–0.04)
IMM GRANULOCYTES NFR BLD AUTO: 0.1 % (ref 0–0.5)
LDLC SERPL CALC-MCNC: 113.2 MG/DL (ref 63–159)
LYMPHOCYTES # BLD AUTO: 1.6 K/UL (ref 1–4.8)
LYMPHOCYTES NFR BLD: 23.2 % (ref 18–48)
MCH RBC QN AUTO: 30.9 PG (ref 27–31)
MCHC RBC AUTO-ENTMCNC: 34.3 G/DL (ref 32–36)
MCV RBC AUTO: 90 FL (ref 82–98)
MICROALBUMIN UR DL<=1MG/L-MCNC: 80 UG/ML
MONOCYTES # BLD AUTO: 1 K/UL (ref 0.3–1)
MONOCYTES NFR BLD: 13.9 % (ref 4–15)
NEUTROPHILS # BLD AUTO: 4.1 K/UL (ref 1.8–7.7)
NEUTROPHILS NFR BLD: 59.6 % (ref 38–73)
NONHDLC SERPL-MCNC: 137 MG/DL
NRBC BLD-RTO: 0 /100 WBC
PLATELET # BLD AUTO: 299 K/UL (ref 150–450)
PMV BLD AUTO: 8.9 FL (ref 9.2–12.9)
POTASSIUM SERPL-SCNC: 4.2 MMOL/L (ref 3.5–5.1)
PROT SERPL-MCNC: 7.8 G/DL (ref 6–8.4)
RBC # BLD AUTO: 5.12 M/UL (ref 4.6–6.2)
SODIUM SERPL-SCNC: 138 MMOL/L (ref 136–145)
TRIGL SERPL-MCNC: 119 MG/DL (ref 30–150)
TSH SERPL DL<=0.005 MIU/L-ACNC: 1.03 UIU/ML (ref 0.4–4)
WBC # BLD AUTO: 6.91 K/UL (ref 3.9–12.7)

## 2023-09-08 PROCEDURE — 36415 COLL VENOUS BLD VENIPUNCTURE: CPT | Performed by: FAMILY MEDICINE

## 2023-09-08 PROCEDURE — 84443 ASSAY THYROID STIM HORMONE: CPT | Performed by: FAMILY MEDICINE

## 2023-09-08 PROCEDURE — 80061 LIPID PANEL: CPT | Performed by: FAMILY MEDICINE

## 2023-09-08 PROCEDURE — 84153 ASSAY OF PSA TOTAL: CPT | Performed by: FAMILY MEDICINE

## 2023-09-08 PROCEDURE — 83036 HEMOGLOBIN GLYCOSYLATED A1C: CPT | Performed by: FAMILY MEDICINE

## 2023-09-08 PROCEDURE — 85025 COMPLETE CBC W/AUTO DIFF WBC: CPT | Performed by: FAMILY MEDICINE

## 2023-09-08 PROCEDURE — 80053 COMPREHEN METABOLIC PANEL: CPT | Performed by: FAMILY MEDICINE

## 2023-09-08 PROCEDURE — 82043 UR ALBUMIN QUANTITATIVE: CPT | Performed by: FAMILY MEDICINE

## 2023-09-12 RX ORDER — LISINOPRIL 10 MG/1
TABLET ORAL
Qty: 90 TABLET | Refills: 0 | Status: SHIPPED | OUTPATIENT
Start: 2023-09-12 | End: 2023-12-13 | Stop reason: SDUPTHER

## 2023-09-26 ENCOUNTER — OFFICE VISIT (OUTPATIENT)
Dept: FAMILY MEDICINE | Facility: CLINIC | Age: 53
End: 2023-09-26
Payer: COMMERCIAL

## 2023-09-26 VITALS
RESPIRATION RATE: 18 BRPM | HEIGHT: 70 IN | HEART RATE: 78 BPM | SYSTOLIC BLOOD PRESSURE: 122 MMHG | DIASTOLIC BLOOD PRESSURE: 78 MMHG | WEIGHT: 217.38 LBS | BODY MASS INDEX: 31.12 KG/M2 | OXYGEN SATURATION: 97 % | TEMPERATURE: 97 F

## 2023-09-26 DIAGNOSIS — Z23 NEED FOR INFLUENZA VACCINATION: Primary | ICD-10-CM

## 2023-09-26 PROCEDURE — 1160F PR REVIEW ALL MEDS BY PRESCRIBER/CLIN PHARMACIST DOCUMENTED: ICD-10-PCS | Mod: CPTII,S$GLB,, | Performed by: FAMILY MEDICINE

## 2023-09-26 PROCEDURE — 4010F ACE/ARB THERAPY RXD/TAKEN: CPT | Mod: CPTII,S$GLB,, | Performed by: FAMILY MEDICINE

## 2023-09-26 PROCEDURE — 99214 PR OFFICE/OUTPT VISIT, EST, LEVL IV, 30-39 MIN: ICD-10-PCS | Mod: 25,S$GLB,, | Performed by: FAMILY MEDICINE

## 2023-09-26 PROCEDURE — 3051F HG A1C>EQUAL 7.0%<8.0%: CPT | Mod: CPTII,S$GLB,, | Performed by: FAMILY MEDICINE

## 2023-09-26 PROCEDURE — 99214 OFFICE O/P EST MOD 30 MIN: CPT | Mod: 25,S$GLB,, | Performed by: FAMILY MEDICINE

## 2023-09-26 PROCEDURE — 3008F BODY MASS INDEX DOCD: CPT | Mod: CPTII,S$GLB,, | Performed by: FAMILY MEDICINE

## 2023-09-26 PROCEDURE — 90471 IMMUNIZATION ADMIN: CPT | Mod: S$GLB,,, | Performed by: FAMILY MEDICINE

## 2023-09-26 PROCEDURE — 1159F MED LIST DOCD IN RCRD: CPT | Mod: CPTII,S$GLB,, | Performed by: FAMILY MEDICINE

## 2023-09-26 PROCEDURE — 3051F PR MOST RECENT HEMOGLOBIN A1C LEVEL 7.0 - < 8.0%: ICD-10-PCS | Mod: CPTII,S$GLB,, | Performed by: FAMILY MEDICINE

## 2023-09-26 PROCEDURE — 90686 IIV4 VACC NO PRSV 0.5 ML IM: CPT | Mod: S$GLB,,, | Performed by: FAMILY MEDICINE

## 2023-09-26 PROCEDURE — 3066F PR DOCUMENTATION OF TREATMENT FOR NEPHROPATHY: ICD-10-PCS | Mod: CPTII,S$GLB,, | Performed by: FAMILY MEDICINE

## 2023-09-26 PROCEDURE — 99999 PR PBB SHADOW E&M-EST. PATIENT-LVL IV: CPT | Mod: PBBFAC,,, | Performed by: FAMILY MEDICINE

## 2023-09-26 PROCEDURE — 1159F PR MEDICATION LIST DOCUMENTED IN MEDICAL RECORD: ICD-10-PCS | Mod: CPTII,S$GLB,, | Performed by: FAMILY MEDICINE

## 2023-09-26 PROCEDURE — 3060F PR POS MICROALBUMINURIA RESULT DOCUMENTED/REVIEW: ICD-10-PCS | Mod: CPTII,S$GLB,, | Performed by: FAMILY MEDICINE

## 2023-09-26 PROCEDURE — 90686 FLU VACCINE (QUAD) GREATER THAN OR EQUAL TO 3YO PRESERVATIVE FREE IM: ICD-10-PCS | Mod: S$GLB,,, | Performed by: FAMILY MEDICINE

## 2023-09-26 PROCEDURE — 3078F PR MOST RECENT DIASTOLIC BLOOD PRESSURE < 80 MM HG: ICD-10-PCS | Mod: CPTII,S$GLB,, | Performed by: FAMILY MEDICINE

## 2023-09-26 PROCEDURE — 3060F POS MICROALBUMINURIA REV: CPT | Mod: CPTII,S$GLB,, | Performed by: FAMILY MEDICINE

## 2023-09-26 PROCEDURE — 3074F SYST BP LT 130 MM HG: CPT | Mod: CPTII,S$GLB,, | Performed by: FAMILY MEDICINE

## 2023-09-26 PROCEDURE — 3008F PR BODY MASS INDEX (BMI) DOCUMENTED: ICD-10-PCS | Mod: CPTII,S$GLB,, | Performed by: FAMILY MEDICINE

## 2023-09-26 PROCEDURE — 4010F PR ACE/ARB THEARPY RXD/TAKEN: ICD-10-PCS | Mod: CPTII,S$GLB,, | Performed by: FAMILY MEDICINE

## 2023-09-26 PROCEDURE — 3066F NEPHROPATHY DOC TX: CPT | Mod: CPTII,S$GLB,, | Performed by: FAMILY MEDICINE

## 2023-09-26 PROCEDURE — 99999 PR PBB SHADOW E&M-EST. PATIENT-LVL IV: ICD-10-PCS | Mod: PBBFAC,,, | Performed by: FAMILY MEDICINE

## 2023-09-26 PROCEDURE — 3074F PR MOST RECENT SYSTOLIC BLOOD PRESSURE < 130 MM HG: ICD-10-PCS | Mod: CPTII,S$GLB,, | Performed by: FAMILY MEDICINE

## 2023-09-26 PROCEDURE — 90471 FLU VACCINE (QUAD) GREATER THAN OR EQUAL TO 3YO PRESERVATIVE FREE IM: ICD-10-PCS | Mod: S$GLB,,, | Performed by: FAMILY MEDICINE

## 2023-09-26 PROCEDURE — 3078F DIAST BP <80 MM HG: CPT | Mod: CPTII,S$GLB,, | Performed by: FAMILY MEDICINE

## 2023-09-26 PROCEDURE — 1160F RVW MEDS BY RX/DR IN RCRD: CPT | Mod: CPTII,S$GLB,, | Performed by: FAMILY MEDICINE

## 2023-09-26 NOTE — PROGRESS NOTES
"[facility] - Clinic Note    Subjective      Mr. Okeefe is a 53 y.o. male who presents to clinic for Follow-up (3mth follow up)    Discussed patient's recent lab work.  He has been doing well overall.    PMH Umberto has a past medical history of Diabetes mellitus, type 2, GERD (gastroesophageal reflux disease), Hypertension, Neuropathy, and Restless leg syndrome.   PSXH Umberto has a past surgical history that includes Knee surgery and Repair of Achilles tendon (Right, 6/9/2022).    Umberto's family history includes Arthritis in his mother; Hearing loss in his mother; Kidney disease in his maternal uncle and mother.    Umberto reports that he quit smoking about 13 years ago. His smoking use included cigarettes. He started smoking about 28 years ago. He has a 15.0 pack-year smoking history. He has never used smokeless tobacco. He reports that he does not drink alcohol and does not use drugs.   ALG Umberto is allergic to topamax [topiramate] and montelukast.   [facility] Umberto has a current medication list which includes the following prescription(s): atorvastatin, clotrimazole, diclofenac sodium, gabapentin, levemir flextouch u100 insulin, lisinopril, pen needle, diabetic, semaglutide, thin lancets, true metrix glucose meter, true metrix glucose test strip, and tadalafil.     Review of Systems   Constitutional:  Negative for fever.   Respiratory:  Negative for shortness of breath.    Cardiovascular:  Negative for chest pain.     Objective     /78 (BP Location: Left arm, Patient Position: Sitting, BP Method: Large (Manual))   Pulse 78   Temp 96.9 °F (36.1 °C) (Temporal)   Resp 18   Ht 5' 10" (1.778 m)   Wt 98.6 kg (217 lb 6.4 oz)   SpO2 97%   BMI 31.19 kg/m²     Physical Exam  Vitals and nursing note reviewed.   Constitutional:       General: He is not in acute distress.     Appearance: Normal appearance. He is well-developed. He is not diaphoretic.   HENT:      Head: Normocephalic and atraumatic.      Right " Ear: External ear normal.      Left Ear: External ear normal.   Eyes:      General:         Right eye: No discharge.         Left eye: No discharge.   Cardiovascular:      Rate and Rhythm: Normal rate and regular rhythm.      Heart sounds: Normal heart sounds.   Pulmonary:      Effort: Pulmonary effort is normal.      Breath sounds: Normal breath sounds. No wheezing or rales.   Skin:     General: Skin is warm and dry.   Neurological:      Mental Status: He is alert and oriented to person, place, and time. Mental status is at baseline.   Psychiatric:         Mood and Affect: Mood normal.         Behavior: Behavior normal.         Thought Content: Thought content normal.         Judgment: Judgment normal.        Assessment/Plan     1. Need for influenza vaccination  Influenza - Quadrivalent *Preferred* (6 months+) (PF)        Discussed patient's recent lab work.  New patient and new problems to me.  Continue current medications as prescribed.  Flu vaccination today.    Future Appointments   Date Time Provider Department Center   2/15/2024  1:45 PM Galilea Thomas, ANJUM [facility]         Don Oshea MD  Family Medicine  [facility]

## 2023-11-06 RX ORDER — GABAPENTIN 800 MG/1
800 TABLET ORAL 3 TIMES DAILY
Qty: 90 TABLET | Refills: 2 | Status: SHIPPED | OUTPATIENT
Start: 2023-11-06 | End: 2024-03-04 | Stop reason: SDUPTHER

## 2023-12-07 ENCOUNTER — TELEPHONE (OUTPATIENT)
Dept: FAMILY MEDICINE | Facility: CLINIC | Age: 53
End: 2023-12-07
Payer: COMMERCIAL

## 2023-12-07 NOTE — TELEPHONE ENCOUNTER
I spoke to the patient regarding this message and he stated that he have enough medication to last him until his upcoming appointment.    ----- Message from Francisco Cano sent at 12/5/2023  3:24 PM CST -----  Type: Needs Medical Advice  Who Called:  pt's spouse   Symptoms (please be specific):  needs rx   Pharmacy name and phone #:    [facility] - [place], [place] - [address]  [address]  [place] [address]  Phone: [phone] Fax: [phone]  Pt's number:  [phone] spouse [phone] (Mobile)    Additional Information: pt's spouse stated pt is out of needed rx for lisinopriL 10 MG tablet pt has been eric'd for first avail ECA appt which was not until 12/19 8 am and is added to wait list to try and get sooner appt appt but pt is in need of at least a temp rx to last pt until eric'd appt please advise asap thanks!

## 2023-12-13 ENCOUNTER — OFFICE VISIT (OUTPATIENT)
Dept: FAMILY MEDICINE | Facility: CLINIC | Age: 53
End: 2023-12-13
Payer: COMMERCIAL

## 2023-12-13 VITALS
HEIGHT: 70 IN | HEART RATE: 72 BPM | SYSTOLIC BLOOD PRESSURE: 118 MMHG | RESPIRATION RATE: 16 BRPM | BODY MASS INDEX: 31.64 KG/M2 | DIASTOLIC BLOOD PRESSURE: 72 MMHG | WEIGHT: 221 LBS

## 2023-12-13 DIAGNOSIS — E11.9 TYPE 2 DIABETES MELLITUS WITHOUT COMPLICATION, WITHOUT LONG-TERM CURRENT USE OF INSULIN: Primary | ICD-10-CM

## 2023-12-13 DIAGNOSIS — I10 ESSENTIAL HYPERTENSION: ICD-10-CM

## 2023-12-13 PROCEDURE — 4010F PR ACE/ARB THEARPY RXD/TAKEN: ICD-10-PCS | Mod: CPTII,S$GLB,, | Performed by: FAMILY MEDICINE

## 2023-12-13 PROCEDURE — 3060F PR POS MICROALBUMINURIA RESULT DOCUMENTED/REVIEW: ICD-10-PCS | Mod: CPTII,S$GLB,, | Performed by: FAMILY MEDICINE

## 2023-12-13 PROCEDURE — 3066F NEPHROPATHY DOC TX: CPT | Mod: CPTII,S$GLB,, | Performed by: FAMILY MEDICINE

## 2023-12-13 PROCEDURE — 99999 PR PBB SHADOW E&M-EST. PATIENT-LVL III: CPT | Mod: PBBFAC,,, | Performed by: FAMILY MEDICINE

## 2023-12-13 PROCEDURE — 3078F DIAST BP <80 MM HG: CPT | Mod: CPTII,S$GLB,, | Performed by: FAMILY MEDICINE

## 2023-12-13 PROCEDURE — 3074F SYST BP LT 130 MM HG: CPT | Mod: CPTII,S$GLB,, | Performed by: FAMILY MEDICINE

## 2023-12-13 PROCEDURE — 3008F BODY MASS INDEX DOCD: CPT | Mod: CPTII,S$GLB,, | Performed by: FAMILY MEDICINE

## 2023-12-13 PROCEDURE — 3074F PR MOST RECENT SYSTOLIC BLOOD PRESSURE < 130 MM HG: ICD-10-PCS | Mod: CPTII,S$GLB,, | Performed by: FAMILY MEDICINE

## 2023-12-13 PROCEDURE — 99214 OFFICE O/P EST MOD 30 MIN: CPT | Mod: S$GLB,,, | Performed by: FAMILY MEDICINE

## 2023-12-13 PROCEDURE — 99214 PR OFFICE/OUTPT VISIT, EST, LEVL IV, 30-39 MIN: ICD-10-PCS | Mod: S$GLB,,, | Performed by: FAMILY MEDICINE

## 2023-12-13 PROCEDURE — 3051F PR MOST RECENT HEMOGLOBIN A1C LEVEL 7.0 - < 8.0%: ICD-10-PCS | Mod: CPTII,S$GLB,, | Performed by: FAMILY MEDICINE

## 2023-12-13 PROCEDURE — 3066F PR DOCUMENTATION OF TREATMENT FOR NEPHROPATHY: ICD-10-PCS | Mod: CPTII,S$GLB,, | Performed by: FAMILY MEDICINE

## 2023-12-13 PROCEDURE — 4010F ACE/ARB THERAPY RXD/TAKEN: CPT | Mod: CPTII,S$GLB,, | Performed by: FAMILY MEDICINE

## 2023-12-13 PROCEDURE — 3060F POS MICROALBUMINURIA REV: CPT | Mod: CPTII,S$GLB,, | Performed by: FAMILY MEDICINE

## 2023-12-13 PROCEDURE — 3008F PR BODY MASS INDEX (BMI) DOCUMENTED: ICD-10-PCS | Mod: CPTII,S$GLB,, | Performed by: FAMILY MEDICINE

## 2023-12-13 PROCEDURE — 1160F RVW MEDS BY RX/DR IN RCRD: CPT | Mod: CPTII,S$GLB,, | Performed by: FAMILY MEDICINE

## 2023-12-13 PROCEDURE — 3078F PR MOST RECENT DIASTOLIC BLOOD PRESSURE < 80 MM HG: ICD-10-PCS | Mod: CPTII,S$GLB,, | Performed by: FAMILY MEDICINE

## 2023-12-13 PROCEDURE — 1159F MED LIST DOCD IN RCRD: CPT | Mod: CPTII,S$GLB,, | Performed by: FAMILY MEDICINE

## 2023-12-13 PROCEDURE — 1160F PR REVIEW ALL MEDS BY PRESCRIBER/CLIN PHARMACIST DOCUMENTED: ICD-10-PCS | Mod: CPTII,S$GLB,, | Performed by: FAMILY MEDICINE

## 2023-12-13 PROCEDURE — 99999 PR PBB SHADOW E&M-EST. PATIENT-LVL III: ICD-10-PCS | Mod: PBBFAC,,, | Performed by: FAMILY MEDICINE

## 2023-12-13 PROCEDURE — 3051F HG A1C>EQUAL 7.0%<8.0%: CPT | Mod: CPTII,S$GLB,, | Performed by: FAMILY MEDICINE

## 2023-12-13 PROCEDURE — 1159F PR MEDICATION LIST DOCUMENTED IN MEDICAL RECORD: ICD-10-PCS | Mod: CPTII,S$GLB,, | Performed by: FAMILY MEDICINE

## 2023-12-13 RX ORDER — ATORVASTATIN CALCIUM 40 MG/1
40 TABLET, FILM COATED ORAL DAILY
Qty: 90 TABLET | Refills: 3 | Status: SHIPPED | OUTPATIENT
Start: 2023-12-13

## 2023-12-13 RX ORDER — INSULIN DETEMIR 100 [IU]/ML
INJECTION, SOLUTION SUBCUTANEOUS
Start: 2023-12-13 | End: 2024-01-09 | Stop reason: SDUPTHER

## 2023-12-13 RX ORDER — LISINOPRIL 10 MG/1
10 TABLET ORAL DAILY
Qty: 90 TABLET | Refills: 3 | Status: SHIPPED | OUTPATIENT
Start: 2023-12-13

## 2023-12-13 NOTE — ASSESSMENT & PLAN NOTE
Stable. Well controlled. Continue current medications.     
Stable. Well controlled. Continue current medications.   Lab Results   Component Value Date    HGBA1C 7.0 (H) 09/08/2023       
Female

## 2023-12-13 NOTE — PATIENT INSTRUCTIONS
Thank you for allowing me to participate in your care today. It is an honor to be a part of your healthcare team at [facility]. If you had labs ordered today, you will receive notification via [employer], phone call or mailed letter regarding your results within 7 days. If you have any questions or concerns regarding your visit today, please do not hesitate to contact us.  Sincerely,   Hortencia Mar M.D.

## 2023-12-13 NOTE — PROGRESS NOTES
"Subjective:       Patient ID: Umberto Okeefe is a 53 y.o. male.    Chief Complaint: Establish Care (Patient presents today to establish care. He states he will need medications refilled today. He will also need lab orders put in. )    Umberto Okeefe presents today to establish care.   They are new to me but established with [facility].  Former patient of Dr. Alba and Dr. Oshea.     Patient Active Problem List:     Type 2 diabetes mellitus without complication, without long-term current use of insulin     Essential hypertension     Other male erectile dysfunction     Achilles rupture, right    Current Outpatient Medications:  atorvastatin (LIPITOR) 40 MG tablet, Take 1 tablet by mouth once daily  clotrimazole (LOTRIMIN) 1 % Soln, Apply topically 2 (two) times daily.  diclofenac sodium (VOLTAREN) 1 % Gel, APPLY 2 GRAMS TO THE AFFECTED AREA 3 TIMES DAILY  gabapentin (NEURONTIN) 800 MG tablet, TAKE 1 TABLET BY MOUTH THREE TIMES DAILY  insulin detemir U-100 (LEVEMIR FLEXTOUCH U-100 INSULN) 100 unit/mL (3 mL) InPn pen, INJECT 47 UNITS SUBCUTANEOUSLY IN THE EVENING Strength: 100 unit/mL (3 mL) (Patient taking differently: INJECT 50 UNITS SUBCUTANEOUSLY IN THE EVENING Strength: 100 unit/mL (3 mL))  lisinopriL 10 MG tablet, Take 1 tablet by mouth once daily  pen needle, diabetic 31 gauge x 5/16" Ndle, Use as directed BID and PRN. Dx: E11.9  semaglutide (OZEMPIC) 1 mg/dose (4 mg/3 mL), Inject 1 mg into the skin every 7 days.  THIN LANCETS 26 gauge Misc,   TRUE METRIX GLUCOSE METER Misc,   TRUE METRIX GLUCOSE TEST STRIP Strp, Use as directed to check blood glucose twice daily and PRN. Dx E11.9.  tadalafiL (CIALIS) 10 MG tablet, Take 1 tablet (10 mg total) by mouth daily as needed for Erectile Dysfunction.          Review of Systems   Constitutional:  Negative for activity change, appetite change, fatigue and fever.   Respiratory:  Negative for shortness of breath.    Gastrointestinal:  Negative for " abdominal pain.   Integumentary:  Negative for rash.         Objective:      Physical Exam  Vitals and nursing note reviewed.   Constitutional:       General: He is not in acute distress.     Appearance: He is not ill-appearing.   Cardiovascular:      Rate and Rhythm: Normal rate and regular rhythm.      Heart sounds: No murmur heard.  Pulmonary:      Effort: Pulmonary effort is normal.      Breath sounds: Normal breath sounds. No wheezing.   Skin:     General: Skin is warm and dry.      Findings: No rash.   Neurological:      Mental Status: He is alert.   Psychiatric:         Mood and Affect: Mood normal.         Behavior: Behavior normal.         Assessment:       1. Type 2 diabetes mellitus without complication, without long-term current use of insulin    2. Essential hypertension        Plan:       Problem List Items Addressed This Visit          Cardiac/Vascular    Essential hypertension     Stable. Well controlled. Continue current medications.            Relevant Orders    Comprehensive Metabolic Panel    Comprehensive Metabolic Panel    TSH       Endocrine    Type 2 diabetes mellitus without complication, without long-term current use of insulin - Primary     Stable. Well controlled. Continue current medications.   Lab Results   Component Value Date    HGBA1C 7.0 (H) 09/08/2023            Relevant Orders    Hemoglobin A1C    Hemoglobin A1C

## 2023-12-18 ENCOUNTER — E-VISIT (OUTPATIENT)
Dept: FAMILY MEDICINE | Facility: CLINIC | Age: 53
End: 2023-12-18
Payer: COMMERCIAL

## 2023-12-18 DIAGNOSIS — J02.9 SORE THROAT: ICD-10-CM

## 2023-12-18 DIAGNOSIS — R05.9 COUGH, UNSPECIFIED TYPE: Primary | ICD-10-CM

## 2023-12-19 ENCOUNTER — TELEPHONE (OUTPATIENT)
Dept: FAMILY MEDICINE | Facility: CLINIC | Age: 53
End: 2023-12-19
Payer: COMMERCIAL

## 2023-12-19 ENCOUNTER — LAB VISIT (OUTPATIENT)
Dept: LAB | Facility: HOSPITAL | Age: 53
End: 2023-12-19
Attending: FAMILY MEDICINE
Payer: COMMERCIAL

## 2023-12-19 DIAGNOSIS — I10 ESSENTIAL HYPERTENSION: ICD-10-CM

## 2023-12-19 DIAGNOSIS — E11.9 TYPE 2 DIABETES MELLITUS WITHOUT COMPLICATION, WITHOUT LONG-TERM CURRENT USE OF INSULIN: ICD-10-CM

## 2023-12-19 LAB
ALBUMIN SERPL BCP-MCNC: 4.1 G/DL (ref 3.5–5.2)
ALP SERPL-CCNC: 81 U/L (ref 55–135)
ALT SERPL W/O P-5'-P-CCNC: 24 U/L (ref 10–44)
ANION GAP SERPL CALC-SCNC: 14 MMOL/L (ref 8–16)
AST SERPL-CCNC: 16 U/L (ref 10–40)
BILIRUB SERPL-MCNC: 0.8 MG/DL (ref 0.1–1)
BUN SERPL-MCNC: 12 MG/DL (ref 6–20)
CALCIUM SERPL-MCNC: 9 MG/DL (ref 8.7–10.5)
CHLORIDE SERPL-SCNC: 104 MMOL/L (ref 95–110)
CO2 SERPL-SCNC: 23 MMOL/L (ref 23–29)
CREAT SERPL-MCNC: 1.2 MG/DL (ref 0.5–1.4)
EST. GFR  (NO RACE VARIABLE): >60 ML/MIN/1.73 M^2
ESTIMATED AVG GLUCOSE: 157 MG/DL (ref 68–131)
GLUCOSE SERPL-MCNC: 125 MG/DL (ref 70–110)
HBA1C MFR BLD: 7.1 % (ref 4–5.6)
POTASSIUM SERPL-SCNC: 4 MMOL/L (ref 3.5–5.1)
PROT SERPL-MCNC: 7.3 G/DL (ref 6–8.4)
SODIUM SERPL-SCNC: 141 MMOL/L (ref 136–145)
TSH SERPL DL<=0.005 MIU/L-ACNC: 1.38 UIU/ML (ref 0.4–4)

## 2023-12-19 PROCEDURE — 80053 COMPREHEN METABOLIC PANEL: CPT | Performed by: FAMILY MEDICINE

## 2023-12-19 PROCEDURE — 84443 ASSAY THYROID STIM HORMONE: CPT | Performed by: FAMILY MEDICINE

## 2023-12-19 PROCEDURE — 99421 OL DIG E/M SVC 5-10 MIN: CPT | Mod: ,,, | Performed by: STUDENT IN AN ORGANIZED HEALTH CARE EDUCATION/TRAINING PROGRAM

## 2023-12-19 PROCEDURE — 36415 COLL VENOUS BLD VENIPUNCTURE: CPT | Performed by: FAMILY MEDICINE

## 2023-12-19 PROCEDURE — 99421 PR E&M, ONLINE DIGIT, EST, < 7 DAYS, 5-10 MINS: ICD-10-PCS | Mod: ,,, | Performed by: STUDENT IN AN ORGANIZED HEALTH CARE EDUCATION/TRAINING PROGRAM

## 2023-12-19 PROCEDURE — 83036 HEMOGLOBIN GLYCOSYLATED A1C: CPT | Performed by: FAMILY MEDICINE

## 2023-12-19 RX ORDER — PROMETHAZINE HYDROCHLORIDE AND DEXTROMETHORPHAN HYDROBROMIDE 6.25; 15 MG/5ML; MG/5ML
5 SYRUP ORAL EVERY 6 HOURS PRN
Qty: 118 ML | Refills: 0 | Status: SHIPPED | OUTPATIENT
Start: 2023-12-19 | End: 2023-12-29

## 2023-12-19 RX ORDER — FLUTICASONE PROPIONATE 50 MCG
1 SPRAY, SUSPENSION (ML) NASAL DAILY
Qty: 16 G | Refills: 1 | Status: SHIPPED | OUTPATIENT
Start: 2023-12-19 | End: 2024-02-15

## 2023-12-19 RX ORDER — CETIRIZINE HYDROCHLORIDE 10 MG/1
10 TABLET ORAL DAILY
Qty: 30 TABLET | Refills: 0 | Status: SHIPPED | OUTPATIENT
Start: 2023-12-19 | End: 2024-12-18

## 2023-12-19 NOTE — PATIENT INSTRUCTIONS

## 2023-12-19 NOTE — PROGRESS NOTES
Patient ID: Umberto Okeefe is a 53 y.o. male.    Chief Complaint: URI (Entered automatically based on patient selection in Patient Portal.)    The patient initiated a request through [facility] on 12/18/2023 for evaluation and management with a chief complaint of URI (Entered automatically based on patient selection in Patient Portal.)     I evaluated the questionnaire submission on 12/19/23.    [facility] Peq Evisit Upper Respitatory/Cough Questionnaire    12/18/2023  8:00 PM CST - Filed by Patient   Do you agree to participate in an E-Visit? Yes   If you have any of the following symptoms, please present to your local ER or call 911:  I acknowledge   What is the main issue that you would like for your doctor to address today? If something can be called in   Are you able to take your vital signs? No   What symptoms do you currently have?  Cough;  Nasal Congestion;  Runny nose;  Sore throat   Describe your cough: Productive (containing mucus)   Describe the mucus: Thin   Have you had any of the following? None of the above   Have you ever smoked? I have smoked in the past   Have you had a fever? No   When did your symptoms first appear? 12/16/2023   In the last two weeks, have you been in close contact with someone who has COVID-19 or the Flu? No   In the last two weeks, have you worked or volunteered in a healthcare facility or as a ? Healthcare facilities include a hospital, medical or dental clinic, long-term care facility, or nursing home No   Do you live in a long-term care facility, nursing home, group home, or homeless shelter? No   List what you have done or taken to help your symptoms. None   How severe are your symptoms? Moderate   Have your symptoms improved since they first appeared? Worse   Have you taken an at home Covid test? Yes   What were the results? Negative   Have you taken a Flu test? No   Have you been fully vaccinated for COVID? (2 Pfizer, 2 Moderna or 1 Bautista & Bautista vaccine  injections) Yes   Have you received a booster? No   Have you recieved a Flu shot? Yes   When did you recieve your Flu shot? 8/13/2023   Do you have transportation to get tested for COVID if it is indicated and ordered for you at an [facility] location? Yes   Provide any information you feel is important to your history not asked above    Please attach any relevant images or files          Recent Labs Obtained:  No visits with results within 7 Day(s) from this visit.   Latest known visit with results is:   Lab Visit on 09/08/2023   Component Date Value Ref Range Status    Cholesterol 09/08/2023 169  120 - 199 mg/dL Final    Comment: The National Cholesterol Education Program (NCEP) has set the  following guidelines (reference ranges) for Cholesterol:  Optimal.....................<200 mg/dL  Borderline High.............200-239 mg/dL  High........................> or = 240 mg/dL      Triglycerides 09/08/2023 119  30 - 150 mg/dL Final    Comment: The National Cholesterol Education Program (NCEP) has set the  following guidelines (reference values) for triglycerides:  Normal......................<150 mg/dL  Borderline High.............150-199 mg/dL  High........................200-499 mg/dL      HDL 09/08/2023 32 (L)  40 - 75 mg/dL Final    Comment: The National Cholesterol Education Program (NCEP) has set the  following guidelines (reference values) for HDL Cholesterol:  Low...............<40 mg/dL  Optimal...........>60 mg/dL      LDL Cholesterol 09/08/2023 113.2  63.0 - 159.0 mg/dL Final    Comment: The National Cholesterol Education Program (NCEP) has set the  following guidelines (reference values) for LDL Cholesterol:  Optimal.......................<130 mg/dL  Borderline High...............130-159 mg/dL  High..........................160-189 mg/dL  Very High.....................>190 mg/dL      HDL/Cholesterol Ratio 09/08/2023 18.9 (L)  20.0 - 50.0 % Final    Total Cholesterol/HDL Ratio 09/08/2023 5.3 (H)  2.0 - 5.0  Final    Non-HDL Cholesterol 09/08/2023 137  mg/dL Final    Comment: Risk category and Non-HDL cholesterol goals:  Coronary heart disease (CHD)or equivalent (10-year risk of CHD >20%):  Non-HDL cholesterol goal     <130 mg/dL  Two or more CHD risk factors and 10-year risk of CHD <= 20%:  Non-HDL cholesterol goal     <160 mg/dL  0 to 1 CHD risk factor:  Non-HDL cholesterol goal     <190 mg/dL      WBC 09/08/2023 6.91  3.90 - 12.70 K/uL Final    RBC 09/08/2023 5.12  4.60 - 6.20 M/uL Final    Hemoglobin 09/08/2023 15.8  14.0 - 18.0 g/dL Final    Hematocrit 09/08/2023 46.0  40.0 - 54.0 % Final    MCV 09/08/2023 90  82 - 98 fL Final    MCH 09/08/2023 30.9  27.0 - 31.0 pg Final    MCHC 09/08/2023 34.3  32.0 - 36.0 g/dL Final    RDW 09/08/2023 12.3  11.5 - 14.5 % Final    Platelets 09/08/2023 299  150 - 450 K/uL Final    MPV 09/08/2023 8.9 (L)  9.2 - 12.9 fL Final    Immature Granulocytes 09/08/2023 0.1  0.0 - 0.5 % Final    Gran # (ANC) 09/08/2023 4.1  1.8 - 7.7 K/uL Final    Immature Grans (Abs) 09/08/2023 0.01  0.00 - 0.04 K/uL Final    Comment: Mild elevation in immature granulocytes is non specific and   can be seen in a variety of conditions including stress response,   acute inflammation, trauma and pregnancy. Correlation with other   laboratory and clinical findings is essential.      Lymph # 09/08/2023 1.6  1.0 - 4.8 K/uL Final    Mono # 09/08/2023 1.0  0.3 - 1.0 K/uL Final    Eos # 09/08/2023 0.2  0.0 - 0.5 K/uL Final    Baso # 09/08/2023 0.06  0.00 - 0.20 K/uL Final    nRBC 09/08/2023 0  0 /100 WBC Final    Gran % 09/08/2023 59.6  38.0 - 73.0 % Final    Lymph % 09/08/2023 23.2  18.0 - 48.0 % Final    Mono % 09/08/2023 13.9  4.0 - 15.0 % Final    Eosinophil % 09/08/2023 2.3  0.0 - 8.0 % Final    Basophil % 09/08/2023 0.9  0.0 - 1.9 % Final    Differential Method 09/08/2023 Automated   Final    Sodium 09/08/2023 138  136 - 145 mmol/L Final    Potassium 09/08/2023 4.2  3.5 - 5.1 mmol/L Final    Chloride 09/08/2023  104  95 - 110 mmol/L Final    CO2 09/08/2023 24  23 - 29 mmol/L Final    Glucose 09/08/2023 153 (H)  70 - 110 mg/dL Final    BUN 09/08/2023 23 (H)  6 - 20 mg/dL Final    Creatinine 09/08/2023 1.2  0.5 - 1.4 mg/dL Final    Calcium 09/08/2023 9.6  8.7 - 10.5 mg/dL Final    Total Protein 09/08/2023 7.8  6.0 - 8.4 g/dL Final    Albumin 09/08/2023 4.2  3.5 - 5.2 g/dL Final    Total Bilirubin 09/08/2023 0.5  0.1 - 1.0 mg/dL Final    Comment: For infants and newborns, interpretation of results should be based  on gestational age, weight and in agreement with clinical  observations.    Premature Infant recommended reference ranges:  Up to 24 hours.............<8.0 mg/dL  Up to 48 hours............<12.0 mg/dL  3-5 days..................<15.0 mg/dL  6-29 days.................<15.0 mg/dL      Alkaline Phosphatase 09/08/2023 80  55 - 135 U/L Final    AST 09/08/2023 21  10 - 40 U/L Final    ALT 09/08/2023 29  10 - 44 U/L Final    eGFR 09/08/2023 >60.0  >60 mL/min/1.73 m^2 Final    Anion Gap 09/08/2023 10  8 - 16 mmol/L Final    TSH 09/08/2023 1.032  0.400 - 4.000 uIU/mL Final    Microalbumin, Urine 09/08/2023 80.0  ug/mL Final    Creatinine, Urine 09/08/2023 142.0  23.0 - 375.0 mg/dL Final    Microalb/Creat Ratio 09/08/2023 56.3 (H)  0.0 - 30.0 ug/mg Final    Hemoglobin A1C 09/08/2023 7.0 (H)  4.0 - 5.6 % Final    Comment: ADA Screening Guidelines:  5.7-6.4%  Consistent with prediabetes  >or=6.5%  Consistent with diabetes    High levels of fetal hemoglobin interfere with the HbA1C  assay. Heterozygous hemoglobin variants (HbS, HgC, etc)do  not significantly interfere with this assay.   However, presence of multiple variants may affect accuracy.      Estimated Avg Glucose 09/08/2023 154 (H)  68 - 131 mg/dL Final    PSA, Screen 09/08/2023 0.33  0.00 - 4.00 ng/mL Final    Comment: The testing method is a chemiluminescent microparticle immunoassay   manufactured by [employer] and performed on the    or   [employer]  system. Values obtained with different assay manufacturers   for   methods may be different and cannot be used interchangeably.  PSA Expected levels:  Hormonal Therapy: <0.05 ng/ml  Prostatectomy: <0.01 ng/ml  Radiation Therapy: <1.00 ng/ml         Encounter Diagnoses   Name Primary?    Cough, unspecified type Yes    Sore throat         Orders Placed This Encounter   Procedures    POCT COVID-19 Rapid Screening     Standing Status:   Future     Standing Expiration Date:   2024     Order Specific Question:   Is the patient symptomatic?     Answer:   Yes    POCT Influenza A/B Molecular    POCT Strep A, Molecular      Medications Ordered This Encounter   Medications    cetirizine (ZYRTEC) 10 MG tablet     Sig: Take 1 tablet (10 mg total) by mouth once daily.     Dispense:  30 tablet     Refill:  0    fluticasone propionate (FLONASE) 50 mcg/actuation nasal spray     Si spray (50 mcg total) by Each Nostril route once daily.     Dispense:  16 g     Refill:  1    promethazine-dextromethorphan (PROMETHAZINE-DM) 6.25-15 mg/5 mL Syrp     Sig: Take 5 mLs by mouth every 6 (six) hours as needed (cough).     Dispense:  118 mL     Refill:  0      Recommend testing for flu, covid, strep.  Symptomatic treatment  Notify md if symptoms worsen or no better in 7-10 days    No follow-ups on file.      E-Visit Time Tracking:    Day 1 Time (in minutes): 5     Total Time (in minutes): 5

## 2023-12-19 NOTE — TELEPHONE ENCOUNTER
----- Message from Nova Hodges sent at 12/19/2023 12:41 PM CST -----  Contact: Wife -jenelle  Type: Needs Medical Advice  Who Called:  Pts april Figueroa  Symptoms (please be specific):  sinus inf, earache, congestion, green mucous  How long has patient had these symptoms:  since yesterday afternoon  Pharmacy name and phone #:    [id number] - [place], [place] - [address]  [address] [place] [address]  Phone: [phone] Fax: [phone]  Best Call Back Number:[phone]    Additional Information: Stated pt was just seen and now he is getting sick with infection wanted to see if we can get him something called in for this. Please call back to advise. Thank You

## 2023-12-21 ENCOUNTER — HOSPITAL ENCOUNTER (EMERGENCY)
Facility: HOSPITAL | Age: 53
Discharge: HOME | End: 2023-12-21
Payer: COMMERCIAL

## 2023-12-21 VITALS
OXYGEN SATURATION: 96 % | WEIGHT: 214 LBS | HEART RATE: 94 BPM | SYSTOLIC BLOOD PRESSURE: 155 MMHG | BODY MASS INDEX: 30.71 KG/M2 | TEMPERATURE: 99 F | DIASTOLIC BLOOD PRESSURE: 105 MMHG | RESPIRATION RATE: 16 BRPM

## 2023-12-21 DIAGNOSIS — J01.00 ACUTE MAXILLARY SINUSITIS, RECURRENCE NOT SPECIFIED: Primary | ICD-10-CM

## 2023-12-21 DIAGNOSIS — J06.9 URI WITH COUGH AND CONGESTION: ICD-10-CM

## 2023-12-21 LAB
GROUP A STREP, MOLECULAR: NEGATIVE
INFLUENZA A, MOLECULAR: NEGATIVE
INFLUENZA B, MOLECULAR: NEGATIVE
SARS-COV-2 RDRP RESP QL NAA+PROBE: NEGATIVE
SPECIMEN SOURCE: NORMAL

## 2023-12-21 PROCEDURE — U0002 COVID-19 LAB TEST NON-CDC: HCPCS | Performed by: NURSE PRACTITIONER

## 2023-12-21 PROCEDURE — 99283 EMERGENCY DEPT VISIT LOW MDM: CPT

## 2023-12-21 PROCEDURE — 25000003 PHARM REV CODE 250: Performed by: NURSE PRACTITIONER

## 2023-12-21 PROCEDURE — 87502 INFLUENZA DNA AMP PROBE: CPT | Performed by: NURSE PRACTITIONER

## 2023-12-21 PROCEDURE — 87651 STREP A DNA AMP PROBE: CPT | Performed by: NURSE PRACTITIONER

## 2023-12-21 RX ORDER — FEXOFENADINE HCL AND PSEUDOEPHEDRINE HCI 60; 120 MG/1; MG/1
1 TABLET, EXTENDED RELEASE ORAL 2 TIMES DAILY
Qty: 20 TABLET | Refills: 0 | Status: SHIPPED | OUTPATIENT
Start: 2023-12-21 | End: 2023-12-31

## 2023-12-21 RX ORDER — SULFAMETHOXAZOLE AND TRIMETHOPRIM 800; 160 MG/1; MG/1
1 TABLET ORAL 2 TIMES DAILY
Status: DISCONTINUED | OUTPATIENT
Start: 2023-12-21 | End: 2023-12-21 | Stop reason: HOSPADM

## 2023-12-21 RX ADMIN — SULFAMETHOXAZOLE AND TRIMETHOPRIM 1 TABLET: 800; 160 TABLET ORAL at 09:12

## 2023-12-22 NOTE — ED PROVIDER NOTES
Encounter Date: 2023       History     Chief Complaint   Patient presents with    URI     Pt c/o productive cough (green mucous), ear pain, and sinus pressure      53-year-old [place] male with a history of type 2 diabetes, GERD, hypertension, neuropathy, restless leg syndrome and surgical history including knee surgery and Achilles tendon repair ambulatory to the emergency department with reports of sinus congestion alternating with sinus drainage which started abruptly spray 7 days ago, got better for about 48 hours, then became more acute with maxillary sinus pain left greater than right and subjective fever intermittent x4 days.  States he was seen at his primary care physician's office and tested for flu and COVID on Monday which were both negative at that time.  States he was given Claritin which has not given him significant relief.      Review of patient's allergies indicates:   Allergen Reactions    Topamax [topiramate]      Mental agitation     Montelukast Nausea And Vomiting     Past Medical History:   Diagnosis Date    Diabetes mellitus, type 2     GERD (gastroesophageal reflux disease)     Hypertension     Neuropathy     Restless leg syndrome      Past Surgical History:   Procedure Laterality Date    KNEE SURGERY      REPAIR OF ACHILLES TENDON Right 2022    Procedure: REPAIR, TENDON, ACHILLES;  Surgeon: Andrew Lawson DO;  Location: [facility] OR;  Service: Orthopedics;  Laterality: Right;     Family History   Problem Relation Age of Onset    Kidney disease Mother     Arthritis Mother     Hearing loss Mother     Kidney disease Maternal Uncle      Social History     Tobacco Use    Smoking status: Former     Current packs/day: 0.00     Average packs/day: 1 pack/day for 15.0 years (15.0 ttl pk-yrs)     Types: Cigarettes     Start date: 1995     Quit date: 2010     Years since quittin.6    Smokeless tobacco: Never   Substance Use Topics    Alcohol use: No    Drug use: No     Review of  Systems   Constitutional:  Positive for chills and fatigue.        Chills with subjective fever times 24 hours   HENT:  Positive for congestion, postnasal drip, rhinorrhea, sinus pressure and sinus pain.         Postnasal drip, maxillary sinus pain, sinus pressure, onset x4 days.   Eyes: Negative.    Respiratory:  Positive for cough.         Cough productive of scanty tenacious sputum   Cardiovascular: Negative.    Gastrointestinal: Negative.    Endocrine: Negative.    Genitourinary: Negative.    Musculoskeletal: Negative.    Allergic/Immunologic: Negative.    Neurological: Negative.    Hematological: Negative.    Psychiatric/Behavioral: Negative.     All other systems reviewed and are negative.      Physical Exam     Initial Vitals [12/21/23 1935]   BP Pulse Resp Temp SpO2   (!) 174/106 94 16 98.9 °F (37.2 °C) 96 %      MAP       --         Physical Exam    Nursing note and vitals reviewed.  Constitutional: He appears well-developed and well-nourished.   HENT:   Head: Normocephalic and atraumatic.   Right Ear: External ear normal.   Left Ear: External ear normal.   Oropharynx is erythematous posteriorly with excoriation to the soft palate.   Eyes: Pupils are equal, round, and reactive to light.   Neck: Neck supple.   Normal range of motion.  Cardiovascular:  Normal rate, regular rhythm, normal heart sounds and intact distal pulses.           Pulmonary/Chest: Breath sounds normal.   Abdominal: Abdomen is soft. Bowel sounds are normal.   Musculoskeletal:         General: Normal range of motion.      Cervical back: Normal range of motion and neck supple.     Neurological: He is alert and oriented to person, place, and time. He has normal strength. GCS score is 15. GCS eye subscore is 4. GCS verbal subscore is 5. GCS motor subscore is 6.   Skin: Skin is dry. Capillary refill takes 2 to 3 seconds.   Psychiatric: He has a normal mood and affect. His behavior is normal. Judgment and thought content normal.         ED  Course   Procedures  Labs Reviewed   INFLUENZA A & B BY MOLECULAR   GROUP A STREP, MOLECULAR   SARS-COV-2 RNA AMPLIFICATION, QUAL          Imaging Results    None          Medications - No data to display  Medical Decision Making  Is tenderness to the maxillary sinuses, turbinates are patent and injected with clear drainage, lungs clear to auscultation all fields, he has no retractions, tonsillar pillars are erythematous, he has cervical lymphadenopathy anteriorly, cranial nerves 2-12 grossly intact.  He denies GI symptoms.    Differential will include influenza a and B, strep pharyngitis, viral URI, COVID-19, maxillary sinus infection    Amount and/or Complexity of Data Reviewed  Labs: ordered.     Details: Influenza a, influenza B, strep, and COVID were all negative.  Discussion of management or test interpretation with external provider(s): To his maxillary sinuses left greater than right in the duration of his symptoms with waxing and waning tenderness, congestion, and drainage we will treat him for maxillary sinus infection.  He will be discharged with Bactrim DS 1 p.o. b.i.d., Claritin D 1 p.o. b.i.d. times 10 days, instructions for sinusoidal lavage, Tylenol and Motrin as needed for fever.    Risk  Prescription drug management.                                      Clinical Impression:                  Abdoulaye Estrada NP  12/21/23 2123       Abdoulaye Estrada NP  12/21/23 2139

## 2023-12-22 NOTE — DISCHARGE INSTRUCTIONS
Meds as prescribed  Sinus lavage as discussed 2-3 times daily  Follow-up with your primary care physician for recheck or for symptoms persistent greater than 7 days.   [Recent Weight Loss (___ Lbs)] : recent [unfilled] ~Ulb weight loss [Dyspnea on exertion] : dyspnea during exertion [see HPI] : see HPI [Joint Pain] : joint pain [Negative] : Heme/Lymph [Shortness Of Breath] : no shortness of breath [Chest Pain] : no chest pain [Palpitations] : no palpitations [Cough] : no cough

## 2023-12-26 ENCOUNTER — TELEPHONE (OUTPATIENT)
Dept: FAMILY MEDICINE | Facility: CLINIC | Age: 53
End: 2023-12-26
Payer: COMMERCIAL

## 2023-12-26 NOTE — TELEPHONE ENCOUNTER
----- Message from Nova Hodges sent at 12/26/2023 12:20 PM CST -----  Contact: Spouse Carolina  Type: Needs Medical Advice  Who Called:  Pts Wife Carolina  Symptoms (please be specific):  sinus infection w. Upper respiratory infection  How long has patient had these symptoms:  last 2 weeks   Pharmacy name and phone #:    [id number] - [place], [place] - [address]  [address]  [place] [address]  Phone: [phone] Fax: [phone]  Best Call Back Number:  [phone] or [phone]  Additional Information: Pt stated has already been seen twice and went to the ED for this and believes he just needs some antibiotics to help clear this up. Can we please check on this and call pt back to advise. Thank You.

## 2023-12-26 NOTE — TELEPHONE ENCOUNTER
Called and spoke with patient.  Informed Dr. Mar is out of the office until Thursday and can't be sure that any other provider will call in antibiotics.  Instructed to go to Urgent Care or we can check with Dr. Mar on Thursday to see what she says.

## 2023-12-27 ENCOUNTER — HOSPITAL ENCOUNTER (EMERGENCY)
Facility: HOSPITAL | Age: 53
Discharge: HOME | End: 2023-12-27
Attending: EMERGENCY MEDICINE
Payer: COMMERCIAL

## 2023-12-27 VITALS
SYSTOLIC BLOOD PRESSURE: 116 MMHG | HEIGHT: 70 IN | OXYGEN SATURATION: 96 % | WEIGHT: 214 LBS | TEMPERATURE: 98 F | HEART RATE: 88 BPM | RESPIRATION RATE: 16 BRPM | DIASTOLIC BLOOD PRESSURE: 78 MMHG | BODY MASS INDEX: 30.64 KG/M2

## 2023-12-27 DIAGNOSIS — J06.9 VIRAL URI WITH COUGH: Primary | ICD-10-CM

## 2023-12-27 DIAGNOSIS — R05.9 COUGH: ICD-10-CM

## 2023-12-27 PROCEDURE — 63600175 PHARM REV CODE 636 W HCPCS: Performed by: NURSE PRACTITIONER

## 2023-12-27 PROCEDURE — 25000003 PHARM REV CODE 250: Performed by: NURSE PRACTITIONER

## 2023-12-27 PROCEDURE — 71046 X-RAY EXAM CHEST 2 VIEWS: CPT | Mod: TC

## 2023-12-27 PROCEDURE — 96372 THER/PROPH/DIAG INJ SC/IM: CPT | Performed by: NURSE PRACTITIONER

## 2023-12-27 PROCEDURE — 71046 X-RAY EXAM CHEST 2 VIEWS: CPT | Mod: 26,,, | Performed by: RADIOLOGY

## 2023-12-27 PROCEDURE — 99284 EMERGENCY DEPT VISIT MOD MDM: CPT | Mod: 25

## 2023-12-27 RX ORDER — CODEINE PHOSPHATE AND GUAIFENESIN 10; 100 MG/5ML; MG/5ML
5 SOLUTION ORAL EVERY 4 HOURS PRN
Qty: 100 ML | Refills: 0 | Status: SHIPPED | OUTPATIENT
Start: 2023-12-27 | End: 2024-02-15

## 2023-12-27 RX ORDER — BETAMETHASONE SODIUM PHOSPHATE AND BETAMETHASONE ACETATE 3; 3 MG/ML; MG/ML
6 INJECTION, SUSPENSION INTRA-ARTICULAR; INTRALESIONAL; INTRAMUSCULAR; SOFT TISSUE
Status: COMPLETED | OUTPATIENT
Start: 2023-12-27 | End: 2023-12-27

## 2023-12-27 RX ORDER — HYDROCODONE POLISTIREX AND CHLORPHENIRAMINE POLISTIREX 10; 8 MG/5ML; MG/5ML
5 SUSPENSION, EXTENDED RELEASE ORAL ONCE
Status: COMPLETED | OUTPATIENT
Start: 2023-12-27 | End: 2023-12-27

## 2023-12-27 RX ADMIN — BETAMETHASONE ACETATE AND BETAMETHASONE SODIUM PHOSPHATE 6 MG: 3; 3 INJECTION, SUSPENSION INTRA-ARTICULAR; INTRALESIONAL; INTRAMUSCULAR; SOFT TISSUE at 05:12

## 2023-12-27 RX ADMIN — HYDROCODONE POLISTIREX AND CHLORPHENIRAMINE POLISTIREX 5 ML: 10; 8 SUSPENSION, EXTENDED RELEASE ORAL at 06:12

## 2023-12-28 NOTE — ED PROVIDER NOTES
Venipuncture Blood Specimen Collection  Venipuncture performed in left arm  by Deepika Sevilla with good hemostasis. Patient tolerated the procedure well without complications.   06/07/23   Deepika Sevilla   CHIEF COMPLAINT  Chief Complaint   Patient presents with    Cough     X 10 days. Non productive.        HISTORY OF PRESENT ILLNESS  Umberto Okeefe is a 53 y.o. male presenting with persistent cough. Patient denied SOB, chest pain, or fever.No other specific aggravating or relieving factors otherwise.      PAST MEDICAL HISTORY  Past Medical History:   Diagnosis Date    Diabetes mellitus, type 2     GERD (gastroesophageal reflux disease)     Hypertension     Neuropathy     Restless leg syndrome        CURRENT MEDICATIONS      Current Facility-Administered Medications:     hydrocodone-chlorpheniramine 10-8 mg/5 mL suspension 5 mL, 5 mL, Oral, Once, Anson Lainez NP    Current Outpatient Medications:     atorvastatin (LIPITOR) 40 MG tablet, Take 1 tablet (40 mg total) by mouth once daily., Disp: 90 tablet, Rfl: 3    cetirizine (ZYRTEC) 10 MG tablet, Take 1 tablet (10 mg total) by mouth once daily., Disp: 30 tablet, Rfl: 0    clotrimazole (LOTRIMIN) 1 % Soln, Apply topically 2 (two) times daily., Disp: 30 Bottle, Rfl: 1    diclofenac sodium (VOLTAREN) 1 % Gel, APPLY 2 GRAMS TO THE AFFECTED AREA 3 TIMES DAILY, Disp: 100 g, Rfl: 0    fexofenadine-pseudoephedrine  mg (ALLEGRA-D)  mg per tablet, Take 1 tablet by mouth 2 (two) times daily. for 10 days, Disp: 20 tablet, Rfl: 0    fluticasone propionate (FLONASE) 50 mcg/actuation nasal spray, 1 spray (50 mcg total) by Each Nostril route once daily., Disp: 16 g, Rfl: 1    gabapentin (NEURONTIN) 800 MG tablet, TAKE 1 TABLET BY MOUTH THREE TIMES DAILY, Disp: 90 tablet, Rfl: 2    guaiFENesin-codeine 100-10 mg/5 ml (TUSSI-ORGANIDIN NR)  mg/5 mL syrup, Take 5 mLs by mouth every 4 (four) hours as needed for Cough or Congestion., Disp: 100 mL, Rfl: 0    insulin detemir U-100, Levemir, (LEVEMIR FLEXTOUCH U100 INSULIN) 100 unit/mL (3 mL) InPn pen, INJECT 50 UNITS SUBCUTANEOUSLY IN THE EVENING Strength: 100 unit/mL (3 mL), Disp: , Rfl:     lisinopriL 10 MG tablet,  "Take 1 tablet (10 mg total) by mouth once daily., Disp: 90 tablet, Rfl: 3    pen needle, diabetic 31 gauge x 5/16" Ndle, Use as directed BID and PRN. Dx: E11.9, Disp: 100 each, Rfl: 11    promethazine-dextromethorphan (PROMETHAZINE-DM) 6.25-15 mg/5 mL Syrp, Take 5 mLs by mouth every 6 (six) hours as needed (cough)., Disp: 118 mL, Rfl: 0    semaglutide (OZEMPIC) 1 mg/dose (4 mg/3 mL), Inject 1 mg into the skin every 7 days., Disp: 3 mL, Rfl: 11    tadalafiL (CIALIS) 10 MG tablet, Take 1 tablet (10 mg total) by mouth daily as needed for Erectile Dysfunction., Disp: 30 tablet, Rfl: 0    THIN LANCETS 26 gauge Misc, , Disp: , Rfl: 5    TRUE METRIX GLUCOSE METER Misc, , Disp: , Rfl: 0    TRUE METRIX GLUCOSE TEST STRIP Strp, Use as directed to check blood glucose twice daily and PRN. Dx E11.9., Disp: 100 strip, Rfl: 11    ALLERGIES    Review of patient's allergies indicates:   Allergen Reactions    Topamax [topiramate]      Mental agitation     Montelukast Nausea And Vomiting       SURGICAL HISTORY    Past Surgical History:   Procedure Laterality Date    KNEE SURGERY      REPAIR OF ACHILLES TENDON Right 2022    Procedure: REPAIR, TENDON, ACHILLES;  Surgeon: Andrew Lawson DO;  Location: [facility];  Service: Orthopedics;  Laterality: Right;       SOCIAL HISTORY    Social History     Socioeconomic History    Marital status:    Tobacco Use    Smoking status: Former     Current packs/day: 0.00     Average packs/day: 1 pack/day for 15.0 years (15.0 ttl pk-yrs)     Types: Cigarettes     Start date: 1995     Quit date: 2010     Years since quittin.6    Smokeless tobacco: Never   Substance and Sexual Activity    Alcohol use: No    Drug use: No    Sexual activity: Not Currently     Partners: Female     Social Determinants of Health     Financial Resource Strain: Low Risk  (2023)    Overall Financial Resource Strain (CARDIA)     Difficulty of Paying Living Expenses: Not very hard   Food Insecurity: No " "Food Insecurity (9/25/2023)    Hunger Vital Sign     Worried About Running Out of Food in the Last Year: Never true     Ran Out of Food in the Last Year: Never true   Transportation Needs: No Transportation Needs (9/25/2023)    PRAPARE - Transportation     Lack of Transportation (Medical): No     Lack of Transportation (Non-Medical): No   Physical Activity: Sufficiently Active (9/25/2023)    Exercise Vital Sign     Days of Exercise per Week: 6 days     Minutes of Exercise per Session: 70 min   Stress: Stress Concern Present (9/25/2023)    Citizen of [place] [employer] Occupational [employer] - Occupational Stress Questionnaire     Feeling of Stress : Rather much   Social Connections: Unknown (9/25/2023)    Social Connection and Isolation Panel [NHANES]     Frequency of Communication with Friends and Family: Patient declined     Frequency of Social Gatherings with Friends and Family: Once a week     Active Member of Clubs or Organizations: No     Attends Club or Organization Meetings: Patient declined     Marital Status:    Housing Stability: High Risk (9/25/2023)    Housing Stability Vital Sign     Unable to Pay for Housing in the Last Year: No     Number of Places Lived in the Last Year: 3     Unstable Housing in the Last Year: No       FAMILY HISTORY    Family History   Problem Relation Age of Onset    Kidney disease Mother     Arthritis Mother     Hearing loss Mother     Kidney disease Maternal Uncle        REVIEW OF SYSTEMS    Review of Systems   Respiratory:  Positive for cough.    All other systems reviewed and are negative.    All other systems reviewed and are negative    VITAL SIGNS:   /82   Pulse 88   Temp 98.3 °F (36.8 °C) (Oral)   Resp 16   Ht 5' 10" (1.778 m)   Wt 97.1 kg (214 lb)   SpO2 95%   BMI 30.71 kg/m²      Physical Exam  Constitutional:       Appearance: Normal appearance.   HENT:      Head: Normocephalic.   Cardiovascular:      Rate and Rhythm: Normal rate.   Pulmonary:      Effort: " Pulmonary effort is normal. No respiratory distress.      Breath sounds: Normal breath sounds.   Abdominal:      Palpations: Abdomen is soft.   Musculoskeletal:         General: Normal range of motion.   Skin:     General: Skin is warm.      Capillary Refill: Capillary refill takes less than 2 seconds.   Neurological:      General: No focal deficit present.      Mental Status: He is alert.      GCS: GCS eye subscore is 4. GCS verbal subscore is 5. GCS motor subscore is 6.   Psychiatric:         Attention and Perception: Attention normal.         Mood and Affect: Mood normal.         Speech: Speech normal.       Vitals and nursing note reviewed.     LABS    Labs Reviewed - No data to display      EKG        RADIOLOGY    X-Ray Chest PA And Lateral   Final Result      No acute abnormality.         Electronically signed by: Dorita Barkley   Date:    12/27/2023   Time:    16:50            PROCEDURES    Procedures    Medications   hydrocodone-chlorpheniramine 10-8 mg/5 mL suspension 5 mL (has no administration in time range)   betamethasone acetate-betamethasone sodium phosphate injection 6 mg (6 mg Intramuscular Given 12/27/23 4008)                Medical Decision Making  Umberto Okeefe is a 53 y.o. male presenting with persistent cough. Patient denied SOB, chest pain, or fever.No other specific aggravating or relieving factors otherwise.    patient presenting to the ED for URI symptoms for 10 days. Denies abdominal pain and emesis.. Patient is non-toxic appearing and in no acute distress. Spectrum of symptoms most consistent with viral URI.  No respiratory distress or abnormal lung findings. Low suspicion for strep throat. No sinus TTP or purulent rhinorrhea to suggest acute bacterial rhinosinusitis at this time. No evidence of AOM, mastoiditis, PTA, Be's, epiglottitis, and meningitis.    Differential Diagnosis includes, but is not limited to:  Sepsis, meningitis, cavernous sinus thrombosis, nasal foreign  body, otitis media/external, nasal polyp, bacterial sinusitis, allergic rhinitis, influenza, bacterial/viral pharyngitis, peritonsillar abscess, retropharyngeal abscess, bacterial/viral pneumonia.    X-findings: no pneumonia, no abnormal findigns.   At this time, I feel there is no emergent condition requiring further evaluation or admission. I believe the patient is stable for discharge from the ED. The patient and any additional family present were updated with test results, overall clinical impression, and recommended further plan of care. All questions were answered. patient/caregiver expressed understanding and agreed with current plan for discharge with PCP follow-up within 1 week. Strict return precautions were provided, including fever, N/V, headache, neck stiffness, return/worsening of current symptoms or any other concerns. Discharged home with supportive care. I discussed the use of OTC medications for symptom control. I advised patient to maintain adequate hydration and advance diet as tolerated to maintain adequate nutrition.    Problems Addressed:  Cough: acute illness or injury  Viral URI with cough: acute illness or injury    Amount and/or Complexity of Data Reviewed  Radiology: ordered. Decision-making details documented in ED Course.    Risk  OTC drugs.  Prescription drug management.           Discontinued Medications    No medications on file       New Prescriptions    GUAIFENESIN-CODEINE 100-10 MG/5 ML (TUSSI-ORGANIDIN NR)  MG/5 ML SYRUP    Take 5 mLs by mouth every 4 (four) hours as needed for Cough or Congestion.         DISPOSITION  Patient discharged to home in stable condition.        FINAL IMPRESSION    1. Viral URI with cough    2. Cough         Anson Lainez NP  12/28/23 7804

## 2024-01-02 ENCOUNTER — OFFICE VISIT (OUTPATIENT)
Dept: URGENT CARE | Facility: CLINIC | Age: 54
End: 2024-01-02
Payer: COMMERCIAL

## 2024-01-02 VITALS
OXYGEN SATURATION: 96 % | HEIGHT: 70 IN | RESPIRATION RATE: 18 BRPM | WEIGHT: 217 LBS | SYSTOLIC BLOOD PRESSURE: 120 MMHG | BODY MASS INDEX: 31.07 KG/M2 | HEART RATE: 102 BPM | DIASTOLIC BLOOD PRESSURE: 78 MMHG | TEMPERATURE: 99 F

## 2024-01-02 DIAGNOSIS — B96.89 BACTERIAL SINUSITIS: Primary | ICD-10-CM

## 2024-01-02 DIAGNOSIS — J32.9 BACTERIAL SINUSITIS: Primary | ICD-10-CM

## 2024-01-02 PROCEDURE — 99204 OFFICE O/P NEW MOD 45 MIN: CPT | Mod: S$GLB,,, | Performed by: NURSE PRACTITIONER

## 2024-01-02 RX ORDER — AZITHROMYCIN 250 MG/1
TABLET, FILM COATED ORAL
Qty: 6 TABLET | Refills: 0 | Status: SHIPPED | OUTPATIENT
Start: 2024-01-02 | End: 2024-02-15

## 2024-01-02 RX ORDER — PROMETHAZINE HYDROCHLORIDE AND DEXTROMETHORPHAN HYDROBROMIDE 6.25; 15 MG/5ML; MG/5ML
5 SYRUP ORAL EVERY 4 HOURS PRN
Qty: 118 ML | Refills: 0 | Status: SHIPPED | OUTPATIENT
Start: 2024-01-02 | End: 2024-01-12

## 2024-01-02 NOTE — PROGRESS NOTES
"Subjective:      Patient ID: mUberto Okeefe is a 53 y.o. male.    Vitals:  height is 5' 10" (1.778 m) and weight is 98.4 kg (217 lb). His oral temperature is 99.2 °F (37.3 °C). His blood pressure is 120/78 and his pulse is 102. His respiration is 18 and oxygen saturation is 96%.     Chief Complaint: Sinus Problem    This is a 53 y.o. male who presents today with a chief complaint of "sinus infection".    Sinus Problem  This is a new problem. The current episode started 1 to 4 weeks ago. The problem has been waxing and waning since onset. Maximum temperature: 99.9. He is experiencing no pain. Associated symptoms include coughing, ear pain and sinus pressure. Pertinent negatives include no congestion or sore throat. Past treatments include nasal decongestants and oral decongestants. The treatment provided no relief.       HENT:  Positive for ear pain and sinus pressure. Negative for congestion and sore throat.    Respiratory:  Positive for cough.       Objective:     Physical Exam   Constitutional: He is oriented to person, place, and time. normal  HENT:   Head: Normocephalic and atraumatic.   Ears:   Right Ear: Tympanic membrane, external ear and ear canal normal.   Left Ear: Tympanic membrane, external ear and ear canal normal.   Nose: Congestion present.   Mouth/Throat: Mucous membranes are moist. Oropharynx is clear.   Eyes: Conjunctivae are normal. Extraocular movement intact   Neck: Neck supple.   Cardiovascular: Normal rate, regular rhythm, normal heart sounds and normal pulses.   Pulmonary/Chest: Effort normal and breath sounds normal.   Abdominal: Normal appearance.   Musculoskeletal: Normal range of motion.         General: Normal range of motion.   Neurological: He is alert and oriented to person, place, and time.   Skin: Skin is warm and dry.   Psychiatric: His behavior is normal.   Vitals reviewed.    Assessment:     1. Bacterial sinusitis        Plan:       Bacterial sinusitis  -     azithromycin " (Z-YENIFER) 250 MG tablet; Take 2 tablets by mouth on day 1; Take 1 tablet by mouth on days 2-5  Dispense: 6 tablet; Refill: 0  -     promethazine-dextromethorphan (PROMETHAZINE-DM) 6.25-15 mg/5 mL Syrp; Take 5 mLs by mouth every 4 (four) hours as needed (cough).  Dispense: 118 mL; Refill: 0      INSTRUCTIONS  Meds as prescribed. Follow up as advised.

## 2024-01-09 DIAGNOSIS — E11.9 TYPE 2 DIABETES MELLITUS WITHOUT COMPLICATION, WITHOUT LONG-TERM CURRENT USE OF INSULIN: ICD-10-CM

## 2024-01-09 RX ORDER — INSULIN DETEMIR 100 [IU]/ML
INJECTION, SOLUTION SUBCUTANEOUS
Start: 2024-01-09

## 2024-01-09 NOTE — TELEPHONE ENCOUNTER
----- Message from Shandra Carvalho sent at 1/9/2024  3:06 PM CST -----  Contact: WILMER BERGMAN  Type:  Needs Medical Advice    Who Called:  WILMER BERGMAN  Symptoms (please be specific): PT NEEDS A PA FOR HIS  RX'S:  1. insulin detemir U-100, Levemir, (LEVEMIR FLEXTOUCH U100 INSULIN) 100 unit/mL (3 mL) InPn pen  2. semaglutide (OZEMPIC) 1 mg/dose (4 mg/3 mL)  How long has patient had these symptoms:  N/A   Pharmacy name and phone #:    [id number] - [place], [place] - [address]  [address]  [place] [address]  Phone: [phone] Fax: [phone]  Would the patient rather a call back or a response via [facility]? CALL   Best Call Back Number: [phone] (home)   Additional Information: PT IS OUT OF MEDICATION AND MISSED HIS FRIDAY DOSE.   THANK YOU

## 2024-01-10 ENCOUNTER — TELEPHONE (OUTPATIENT)
Dept: FAMILY MEDICINE | Facility: CLINIC | Age: 54
End: 2024-01-10
Payer: COMMERCIAL

## 2024-01-10 NOTE — TELEPHONE ENCOUNTER
----- Message from Nyla Arambula sent at 1/10/2024  4:33 PM CST -----  Contact: Carolina  Type: Needs Medical Advice    Who Called: Carolina/ PT Wife  Best Call Back Number: [phone]  Additional  Information: PT wife requesting a call back to get a PA for semaglutide (OZEMPIC) 1 mg/dose (4 mg/3 mL), states pt missed shot last Friday.  Please Advise- Thank you

## 2024-01-10 NOTE — TELEPHONE ENCOUNTER
----- Message from Nyla Arambula sent at 1/10/2024  4:18 PM CST -----  Contact: Carolina  Type: Needs Medical Advice    Who Called: Carolina/ Pt Wife  Best Call Back Number: [phone]  Additional  Information: Requesting a call back to discuss getting a PA for insulin detemir U-100, Levemir, pt almost out of medication  Please Advise- Thank you

## 2024-02-15 ENCOUNTER — OFFICE VISIT (OUTPATIENT)
Dept: PODIATRY | Facility: CLINIC | Age: 54
End: 2024-02-15
Payer: COMMERCIAL

## 2024-02-15 VITALS
BODY MASS INDEX: 30.64 KG/M2 | WEIGHT: 214 LBS | HEART RATE: 82 BPM | HEIGHT: 70 IN | RESPIRATION RATE: 16 BRPM | SYSTOLIC BLOOD PRESSURE: 139 MMHG | DIASTOLIC BLOOD PRESSURE: 87 MMHG

## 2024-02-15 DIAGNOSIS — E11.9 COMPREHENSIVE DIABETIC FOOT EXAMINATION, TYPE 2 DM, ENCOUNTER FOR: Primary | ICD-10-CM

## 2024-02-15 DIAGNOSIS — E11.49 TYPE II DIABETES MELLITUS WITH NEUROLOGICAL MANIFESTATIONS: ICD-10-CM

## 2024-02-15 DIAGNOSIS — E11.65 UNCONTROLLED TYPE 2 DIABETES MELLITUS WITH HYPERGLYCEMIA: ICD-10-CM

## 2024-02-15 PROCEDURE — 1160F RVW MEDS BY RX/DR IN RCRD: CPT | Mod: CPTII,S$GLB,, | Performed by: PODIATRIST

## 2024-02-15 PROCEDURE — 1159F MED LIST DOCD IN RCRD: CPT | Mod: CPTII,S$GLB,, | Performed by: PODIATRIST

## 2024-02-15 PROCEDURE — 3075F SYST BP GE 130 - 139MM HG: CPT | Mod: CPTII,S$GLB,, | Performed by: PODIATRIST

## 2024-02-15 PROCEDURE — 99999 PR PBB SHADOW E&M-EST. PATIENT-LVL IV: CPT | Mod: PBBFAC,,, | Performed by: PODIATRIST

## 2024-02-15 PROCEDURE — 3008F BODY MASS INDEX DOCD: CPT | Mod: CPTII,S$GLB,, | Performed by: PODIATRIST

## 2024-02-15 PROCEDURE — 3079F DIAST BP 80-89 MM HG: CPT | Mod: CPTII,S$GLB,, | Performed by: PODIATRIST

## 2024-02-15 PROCEDURE — 99214 OFFICE O/P EST MOD 30 MIN: CPT | Mod: S$GLB,,, | Performed by: PODIATRIST

## 2024-02-18 NOTE — PROGRESS NOTES
Subjective:       Patient ID: Umberto Okeefe is a 53 y.o. male.    Chief Complaint: Diabetic Foot Exam  Patient presents for annual diabetic foot exam, type 2 diabetes with neuropathic pain in both feet, medication refills.    Patient relates he has done very well with 800 mg 3 times daily.  Very active, works full-time, on his feet a lot and states it controls pretty much all the nerve pain he was experiencing.  He will get a stinging pain on occasion.  Is very cautious regarding appropriate shoes.  Has been a type 2 diabetic for about 4-5 years.  He was having nerve pain in his feet prior to his diagnosis.  Reports diabetes has been fairly well-controlled with A1c 7.1.  Reports glucose was 138 today.  No foot pain at time of visit        Past Medical History:   Diagnosis Date    Diabetes mellitus, type 2     GERD (gastroesophageal reflux disease)     Hypertension     Neuropathy     Restless leg syndrome      Past Surgical History:   Procedure Laterality Date    KNEE SURGERY      REPAIR OF ACHILLES TENDON Right 6/9/2022    Procedure: REPAIR, TENDON, ACHILLES;  Surgeon: Andrew Lawson DO;  Location: [facility] OR;  Service: Orthopedics;  Laterality: Right;         Current Outpatient Medications   Medication Sig Dispense Refill    atorvastatin (LIPITOR) 40 MG tablet Take 1 tablet (40 mg total) by mouth once daily. 90 tablet 3    cetirizine (ZYRTEC) 10 MG tablet Take 1 tablet (10 mg total) by mouth once daily. 30 tablet 0    clotrimazole (LOTRIMIN) 1 % Soln Apply topically 2 (two) times daily. 30 Bottle 1    diclofenac sodium (VOLTAREN) 1 % Gel APPLY 2 GRAMS TO THE AFFECTED AREA 3 TIMES DAILY 100 g 0    gabapentin (NEURONTIN) 800 MG tablet TAKE 1 TABLET BY MOUTH THREE TIMES DAILY 90 tablet 2    insulin detemir U-100, Levemir, (LEVEMIR FLEXTOUCH U100 INSULIN) 100 unit/mL (3 mL) InPn pen INJECT 50 UNITS SUBCUTANEOUSLY IN THE EVENING Strength: 100 unit/mL (3 mL)      lisinopriL 10 MG tablet Take 1 tablet (10 mg total) by  "mouth once daily. 90 tablet 3    pen needle, diabetic 31 gauge x 5/16" Ndle Use as directed BID and PRN. Dx: E11.9 100 each 11    semaglutide (OZEMPIC) 1 mg/dose (4 mg/3 mL) Inject 1 mg into the skin every 7 days. 3 mL 11    THIN LANCETS 26 gauge Misc   5    TRUE METRIX GLUCOSE METER Misc   0    TRUE METRIX GLUCOSE TEST STRIP Strp Use as directed to check blood glucose twice daily and PRN. Dx E11.9. 100 strip 11    tadalafiL (CIALIS) 10 MG tablet Take 1 tablet (10 mg total) by mouth daily as needed for Erectile Dysfunction. 30 tablet 0     No current facility-administered medications for this visit.     Review of patient's allergies indicates:   Allergen Reactions    Topamax [topiramate]      Mental agitation     Montelukast Nausea And Vomiting       Review of Systems   Cardiovascular:  Negative for leg swelling.   Musculoskeletal:  Negative for gait problem.   All other systems reviewed and are negative.          Objective:      Vitals:    02/15/24 1347   BP: 139/87   Pulse: 82   Resp: 16   Weight: 97.1 kg (214 lb)   Height: 5' 10" (1.778 m)     Physical Exam  Vitals and nursing note reviewed.   Constitutional:       General: He is not in acute distress.     Appearance: Normal appearance. He is well-developed.   Cardiovascular:      Pulses:           Dorsalis pedis pulses are 2+ on the right side and 2+ on the left side.        Posterior tibial pulses are 2+ on the right side and 2+ on the left side.   Pulmonary:      Effort: Pulmonary effort is normal.   Musculoskeletal:         General: No swelling or tenderness. Normal range of motion.      Right foot: Normal range of motion.      Left foot: Normal range of motion.   Feet:      Right foot:      Protective Sensation: 6 sites tested.  6 sites sensed.      Skin integrity: Skin integrity normal.      Left foot:      Protective Sensation: 6 sites tested.  6 sites sensed.      Skin integrity: Skin integrity normal.      Toenail Condition: Left toenails are normal. "   Skin:     Capillary Refill: Capillary refill takes 2 to 3 seconds.   Neurological:      General: No focal deficit present.      Mental Status: He is alert.      Comments: Detected monofilament all areas bilateral feet.  Painful paresthesias feet, diabetic neuropathy controlled with gabapentin    Psychiatric:         Behavior: Behavior normal.         Thought Content: Thought content normal.               Contains abnormal data Hemoglobin A1C          Component Ref Range & Units 2 mo ago  (12/19/23) 5 mo ago  (9/8/23) 8 mo ago  (6/5/23) 11 mo ago  (3/2/23)   Hemoglobin A1C 4.0 - 5.6 % 7.1 High  7.0 High  7.4 High  8.9 High       Estimated Avg Glucose 68 - 131 mg/dL 157 High  154 High  166 High  209 High              Assessment:       1. Comprehensive diabetic foot examination, type 2 DM, encounter for    2. Type II diabetes mellitus with neurological manifestations    3. Uncontrolled type 2 diabetes mellitus with hyperglycemia          Plan:         CONTINUE GABAPENTIN 800 MG T.I.D.        Comprehensive diabetic pedal exam performed  Reviewed results monofilament testing, good sensation in feet  Reviewed neuropathy at length today with patient.  We discussed controlling painful symptoms of neuropathy but also potential complications in the future with diabetic neuropathy.  Would strongly recommend better control of his A1c.  Explained the longer he has neuropathy more at risk he is for complications  Reviewed A1c  Reviewed benefit of better control glucose/diabetes regarding potential foot complications especially neuropathy  Reviewed gabapentin, dosage and frequency, continue 800 mg 3 times daily  We had a lengthy discussion regarding conservative treatments for neuropathic pain especially performed each evening  Reviewed appropriate shoes,  especially indoors to protect feet, avoid flat shoes, slippers or walking in sock or bare feet.    Discussed maintenance of skin and nails and potential complications.     Reviewed need for daily foot checks and instructed patient to contact the office with any area of redness or swelling which has not improved in a few days.  Patient was in understanding and agreement with treatment plan.  I counseled the patient on their conditions, implications and medical management.  Instructed patient to contact the office with any changes, questions, concerns, worsening of symptoms.   Total face to face time 30 minutes, exam, assessment, treatment, discussion, additional time for review of chart prior to and following appointment and visit documentation, consultation and coordination of care.    Follow up 12 months    This note was created using M*Modal voice recognition software that occasionally misinterpreted phrases or words.

## 2024-02-28 DIAGNOSIS — E11.9 TYPE 2 DIABETES MELLITUS WITHOUT COMPLICATION, UNSPECIFIED WHETHER LONG TERM INSULIN USE: ICD-10-CM

## 2024-02-29 DIAGNOSIS — E11.9 TYPE 2 DIABETES MELLITUS WITHOUT COMPLICATION, WITHOUT LONG-TERM CURRENT USE OF INSULIN: ICD-10-CM

## 2024-02-29 NOTE — TELEPHONE ENCOUNTER
----- Message from Livier Dickinson sent at 2/29/2024  4:56 PM CST -----  Regarding: Refill  Type:  RX Refill Request    Who Called: pt wife mandy   Refill or New Rx:rx     RX Name and Strength:insulin detemir U-100, Levemir, (LEVEMIR FLEXTOUCH U100 INSULIN) 100 unit/mL (3 mL) InPn pen  How is the patient currently taking it? (ex. 1XDay):as directed     Is this a 30 day or 90 day RX:90  Preferred Pharmacy with phone number:  [id number] - [place], [place] - [address]  [address]  [place] [address]  Phone: [phone] Fax: [phone]         Local or Mail Order:local   Ordering Provider:christian Tovar Call Back Number:[phone] (home)     Additional Information: Requesting call back once sent to the pharmacy  please advise thank you

## 2024-02-29 NOTE — TELEPHONE ENCOUNTER
Refill Routing Note   Medication(s) are not appropriate for processing by [facility] for the following reason(s):        New or recently adjusted medication    [facility] action(s):  Defer      ED documentation reviewed. No changes to therapy noted. FOVS 3/20/24.       Extended chart review required: Yes     Appointments  past 12m or future 3m with PCP    Date Provider   Last Visit   12/13/2023 Hortencia Mar MD   Next Visit   3/20/2024 Hortencia Mar MD   ED visits in past 90 days: 2        Note composed:5:48 PM 02/29/2024

## 2024-02-29 NOTE — TELEPHONE ENCOUNTER
No care due was identified.  [facility] Embedded Care Due Messages. Reference number: [id number].   2/29/2024 4:58:43 PM CST

## 2024-03-01 ENCOUNTER — TELEPHONE (OUTPATIENT)
Dept: FAMILY MEDICINE | Facility: CLINIC | Age: 54
End: 2024-03-01

## 2024-03-01 RX ORDER — INSULIN DETEMIR 100 [IU]/ML
50 INJECTION, SOLUTION SUBCUTANEOUS NIGHTLY
Qty: 45 ML | Refills: 0 | Status: SHIPPED | OUTPATIENT
Start: 2024-03-01 | End: 2024-03-14

## 2024-03-01 NOTE — TELEPHONE ENCOUNTER
----- Message from Dio Garcias sent at 3/1/2024 12:09 PM CST -----  Type: Needs Medical Advice  Who Called:  pts wife  Best Call Back Number: [phone] or [phone]  Additional Information: caller states the pharmacy told her the pt needs a PA for the script insulin detemir U-100, Levemir, (LEVEMIR FLEXTOUCH U100 INSULIN) 100 unit/mL (3 mL) InPn pen  , pt only have one shot left or may be out, pl call bk to advise thanks

## 2024-03-04 ENCOUNTER — TELEPHONE (OUTPATIENT)
Dept: FAMILY MEDICINE | Facility: CLINIC | Age: 54
End: 2024-03-04
Payer: COMMERCIAL

## 2024-03-04 DIAGNOSIS — E11.9 TYPE 2 DIABETES MELLITUS WITHOUT COMPLICATION, WITHOUT LONG-TERM CURRENT USE OF INSULIN: ICD-10-CM

## 2024-03-04 RX ORDER — GABAPENTIN 800 MG/1
800 TABLET ORAL 3 TIMES DAILY
Qty: 90 TABLET | Refills: 2 | Status: SHIPPED | OUTPATIENT
Start: 2024-03-04 | End: 2024-06-03

## 2024-03-04 NOTE — TELEPHONE ENCOUNTER
----- Message from Ally Brown sent at 3/1/2024  3:19 PM CST -----  Regarding: Needs Medical Advice-second attempt for PA for RX  Contact: patient's wife at [phone]  Type: Needs Medical Advice-second attempt for PA for RX  Who Called:  patient's wife at [phone]    Pharmacy name and phone #:    [id number] - [place], [place] - [address]  [address]  [place] [address]  Phone: [phone] Fax: [phone]    Additional Information:  caller states the pharmacy told her the pt needs a PA for the script insulin detemir U-100, Levemir, (LEVEMIR FLEXTOUCH U100 INSULIN) 100 unit/mL (3 mL) InPn pen  , pt only have one shot left or will be out, pl call bk to advise thanks

## 2024-03-04 NOTE — TELEPHONE ENCOUNTER
----- Message from Estela Flores, Patient Care Assistant sent at 3/4/2024 10:47 AM CST -----  Regarding: PA  Contact: pt's wife Carolina  Type: Needs Medical Advice    Who Called:  pt's wife Carolina    Best Call Back Number:      Additional Information: pt's wife Carolina states she would like a callback regarding an pre authorization for insulin detemir U-100, Levemir, (LEVEMIR FLEXTOUCH U100 INSULIN) 100 unit/mL (3 mL) InPn pen. Please call to advise. Thanks!

## 2024-03-04 NOTE — TELEPHONE ENCOUNTER
----- Message from Abeba Olivas sent at 3/4/2024  3:33 PM CST -----  Contact: Carolina [phone]  Type: Needs Medical Advice  Who Called:  Carolina     Guy Call Back Number: [phone]  Additional Information: Carolina calling to check on status of pa for leveimire. Carolina advsied she called insurance but could not understated what the agent was saying because of his accent. Pls call back and advise

## 2024-03-05 ENCOUNTER — TELEPHONE (OUTPATIENT)
Dept: FAMILY MEDICINE | Facility: CLINIC | Age: 54
End: 2024-03-05
Payer: COMMERCIAL

## 2024-03-05 NOTE — TELEPHONE ENCOUNTER
Member must try and fail the following drug alternatives: (Basaglar, insulin degludec, NDC  [address]-15, [address]-17, or [address]-04).

## 2024-03-05 NOTE — TELEPHONE ENCOUNTER
Spoke with pt wife. Informed wife jarrod denied.   Wife requested for us to contact   [phone]  Ref # [id number]  In regards to PA.    Reached out to ,   Spoke to pharmacy in regards to denial.  RX denied medical necessity not met  Unable to approve RX, must fail RX   Basaglar, insulin degludec     Reached back to patient and informed pt RX denied and will not be approved. Pt voiced understanding.

## 2024-03-05 NOTE — TELEPHONE ENCOUNTER
"Spoke with pt. Patient states, " am I took bad to just take the pill? If so, just send the tresbia in"   "

## 2024-03-05 NOTE — TELEPHONE ENCOUNTER
----- Message from Donna Adler sent at 3/5/2024 11:50 AM CST -----  Type: Needs Medical Advice  Who Called:  Carolina wife   Best Call Back Number: [phone] (home)   Additional Information: calling to check the status of the PA for the insulin detemir U-100, Levemir, (LEVEMIR FLEXTOUCH U100 INSULIN) 100 unit/mL (3 mL) InPn pen, patient has been out for a while now. She said they have called multiple times on this

## 2024-03-14 ENCOUNTER — LAB VISIT (OUTPATIENT)
Dept: LAB | Facility: HOSPITAL | Age: 54
End: 2024-03-14
Attending: FAMILY MEDICINE
Payer: COMMERCIAL

## 2024-03-14 ENCOUNTER — OFFICE VISIT (OUTPATIENT)
Dept: FAMILY MEDICINE | Facility: CLINIC | Age: 54
End: 2024-03-14
Payer: COMMERCIAL

## 2024-03-14 VITALS
HEART RATE: 86 BPM | DIASTOLIC BLOOD PRESSURE: 88 MMHG | OXYGEN SATURATION: 99 % | RESPIRATION RATE: 18 BRPM | SYSTOLIC BLOOD PRESSURE: 138 MMHG | HEIGHT: 70 IN | WEIGHT: 221 LBS | BODY MASS INDEX: 31.64 KG/M2

## 2024-03-14 DIAGNOSIS — E11.9 TYPE 2 DIABETES MELLITUS WITHOUT COMPLICATION, WITHOUT LONG-TERM CURRENT USE OF INSULIN: Primary | ICD-10-CM

## 2024-03-14 DIAGNOSIS — I73.9 CLAUDICATION: ICD-10-CM

## 2024-03-14 DIAGNOSIS — E11.9 TYPE 2 DIABETES MELLITUS WITHOUT COMPLICATION, WITHOUT LONG-TERM CURRENT USE OF INSULIN: ICD-10-CM

## 2024-03-14 DIAGNOSIS — I10 ESSENTIAL HYPERTENSION: ICD-10-CM

## 2024-03-14 LAB
ALBUMIN SERPL BCP-MCNC: 4.1 G/DL (ref 3.5–5.2)
ALP SERPL-CCNC: 76 U/L (ref 55–135)
ALT SERPL W/O P-5'-P-CCNC: 33 U/L (ref 10–44)
ANION GAP SERPL CALC-SCNC: 11 MMOL/L (ref 8–16)
AST SERPL-CCNC: 21 U/L (ref 10–40)
BILIRUB SERPL-MCNC: 0.7 MG/DL (ref 0.1–1)
BUN SERPL-MCNC: 14 MG/DL (ref 6–20)
CALCIUM SERPL-MCNC: 9.4 MG/DL (ref 8.7–10.5)
CHLORIDE SERPL-SCNC: 102 MMOL/L (ref 95–110)
CO2 SERPL-SCNC: 23 MMOL/L (ref 23–29)
CREAT SERPL-MCNC: 1.2 MG/DL (ref 0.5–1.4)
EST. GFR  (NO RACE VARIABLE): >60 ML/MIN/1.73 M^2
ESTIMATED AVG GLUCOSE: 189 MG/DL (ref 68–131)
GLUCOSE SERPL-MCNC: 232 MG/DL (ref 70–110)
HBA1C MFR BLD: 8.2 % (ref 4–5.6)
POTASSIUM SERPL-SCNC: 4.2 MMOL/L (ref 3.5–5.1)
PROT SERPL-MCNC: 7.3 G/DL (ref 6–8.4)
SODIUM SERPL-SCNC: 136 MMOL/L (ref 136–145)

## 2024-03-14 PROCEDURE — 99999 PR PBB SHADOW E&M-EST. PATIENT-LVL V: CPT | Mod: PBBFAC,,, | Performed by: FAMILY MEDICINE

## 2024-03-14 PROCEDURE — 4010F ACE/ARB THERAPY RXD/TAKEN: CPT | Mod: CPTII,S$GLB,, | Performed by: FAMILY MEDICINE

## 2024-03-14 PROCEDURE — 3052F HG A1C>EQUAL 8.0%<EQUAL 9.0%: CPT | Mod: CPTII,S$GLB,, | Performed by: FAMILY MEDICINE

## 2024-03-14 PROCEDURE — 99214 OFFICE O/P EST MOD 30 MIN: CPT | Mod: S$GLB,,, | Performed by: FAMILY MEDICINE

## 2024-03-14 PROCEDURE — 1160F RVW MEDS BY RX/DR IN RCRD: CPT | Mod: CPTII,S$GLB,, | Performed by: FAMILY MEDICINE

## 2024-03-14 PROCEDURE — 3008F BODY MASS INDEX DOCD: CPT | Mod: CPTII,S$GLB,, | Performed by: FAMILY MEDICINE

## 2024-03-14 PROCEDURE — 36415 COLL VENOUS BLD VENIPUNCTURE: CPT | Performed by: FAMILY MEDICINE

## 2024-03-14 PROCEDURE — 1159F MED LIST DOCD IN RCRD: CPT | Mod: CPTII,S$GLB,, | Performed by: FAMILY MEDICINE

## 2024-03-14 PROCEDURE — 3075F SYST BP GE 130 - 139MM HG: CPT | Mod: CPTII,S$GLB,, | Performed by: FAMILY MEDICINE

## 2024-03-14 PROCEDURE — 83036 HEMOGLOBIN GLYCOSYLATED A1C: CPT | Performed by: FAMILY MEDICINE

## 2024-03-14 PROCEDURE — 3079F DIAST BP 80-89 MM HG: CPT | Mod: CPTII,S$GLB,, | Performed by: FAMILY MEDICINE

## 2024-03-14 PROCEDURE — 80053 COMPREHEN METABOLIC PANEL: CPT | Performed by: FAMILY MEDICINE

## 2024-03-14 RX ORDER — SEMAGLUTIDE 2.68 MG/ML
2 INJECTION, SOLUTION SUBCUTANEOUS
Qty: 3 ML | Refills: 11 | Status: SHIPPED | OUTPATIENT
Start: 2024-03-14 | End: 2025-03-14

## 2024-03-14 RX ORDER — INSULIN GLARGINE 100 [IU]/ML
50 INJECTION, SOLUTION SUBCUTANEOUS NIGHTLY
Qty: 45 ML | Refills: 3 | Status: SHIPPED | OUTPATIENT
Start: 2024-03-14 | End: 2025-03-14

## 2024-03-14 NOTE — PROGRESS NOTES
Subjective:       Patient ID: Umberto Okeefe is a 54 y.o. male.    Chief Complaint: Medication Problem (Discuss diabetic medications. )    Diabetes:  Pt is compliant with therapy.  Diabetes Medications       semaglutide (OZEMPIC) 1 mg/dose (4 mg/3 mL) Inject 1 mg into the skin   every 7 days.    Levemir 100 units/mL SubQ pen Inject 50 Units into the skin every evening.   Lab Results       Component                Value               Date                       HGBA1C                   8.2 (H)             03/14/2024              Leg pain bilateral and easy fatigability. After walking a specific distance he will notice his legs almost feel week. He would like his blood flow checked.       Review of Systems   Constitutional:  Negative for activity change, appetite change, fatigue and fever.   Respiratory:  Negative for shortness of breath.    Gastrointestinal:  Negative for abdominal pain.   Musculoskeletal:         Leg pain and easy fatigueability   Integumentary:  Negative for rash.         Objective:      Physical Exam  Vitals and nursing note reviewed.   Constitutional:       General: He is not in acute distress.     Appearance: He is not ill-appearing.   Cardiovascular:      Rate and Rhythm: Normal rate and regular rhythm.      Heart sounds: No murmur heard.  Pulmonary:      Effort: Pulmonary effort is normal.      Breath sounds: Normal breath sounds. No wheezing.   Skin:     General: Skin is warm and dry.      Findings: No rash.   Neurological:      Mental Status: He is alert.   Psychiatric:         Mood and Affect: Mood normal.         Behavior: Behavior normal.         Assessment:       1. Type 2 diabetes mellitus without complication, without long-term current use of insulin    2. Claudication        Plan:       Problem List Items Addressed This Visit          Endocrine    Type 2 diabetes mellitus without complication, without long-term current use of insulin - Primary    Relevant Medications    insulin  (BASAGLAR KWIKPEN U-100 INSULIN) glargine 100 units/mL SubQ pen    semaglutide (OZEMPIC) 2 mg/dose (8 mg/3 mL) PnIj     Other Visit Diagnoses       Claudication        Relevant Orders    US Ankle Brachial Indices Ext LTD WO Str              Uncontrolled DM. Increasing ozempic. Getting him basaglar.     Work up for leg pain/claudication symptoms.

## 2024-03-25 ENCOUNTER — HOSPITAL ENCOUNTER (OUTPATIENT)
Dept: RADIOLOGY | Facility: HOSPITAL | Age: 54
Discharge: HOME | End: 2024-03-25
Attending: FAMILY MEDICINE
Payer: COMMERCIAL

## 2024-03-25 DIAGNOSIS — I73.9 CLAUDICATION: ICD-10-CM

## 2024-03-25 PROCEDURE — 93922 UPR/L XTREMITY ART 2 LEVELS: CPT | Mod: 26,,, | Performed by: RADIOLOGY

## 2024-03-25 PROCEDURE — 93922 UPR/L XTREMITY ART 2 LEVELS: CPT | Mod: TC

## 2024-04-05 ENCOUNTER — TELEPHONE (OUTPATIENT)
Dept: FAMILY MEDICINE | Facility: CLINIC | Age: 54
End: 2024-04-05
Payer: COMMERCIAL

## 2024-04-05 NOTE — TELEPHONE ENCOUNTER
----- Message from Emelykarleyabner Isabel sent at 4/5/2024  4:37 PM CDT -----  Regarding: Pt Advice  Needs Medical Advice      Who Called: Pt         Pharmacy name and phone #:    [id number] - [place], [place] - [address]  [address]  [place] [address]  Phone: [phone] Fax: [phone]      Would the patient rather a call back or a response via [facility]? Call back    Best Call Back Number:  [phone]      Additional Information: Sts his prescription of semaglutide (OZEMPIC) 2 mg/dose (8 mg/3 mL) PnI is needing a prior authorization.  Sts is currently out and wanting to see if the office can get this done today.  Please Advise - Thank you

## 2024-06-03 RX ORDER — GABAPENTIN 800 MG/1
800 TABLET ORAL 3 TIMES DAILY
Qty: 90 TABLET | Refills: 0 | Status: SHIPPED | OUTPATIENT
Start: 2024-06-03

## 2024-06-19 ENCOUNTER — LAB VISIT (OUTPATIENT)
Dept: LAB | Facility: HOSPITAL | Age: 54
End: 2024-06-19
Attending: FAMILY MEDICINE
Payer: COMMERCIAL

## 2024-06-19 DIAGNOSIS — E11.9 TYPE 2 DIABETES MELLITUS WITHOUT COMPLICATION, WITHOUT LONG-TERM CURRENT USE OF INSULIN: ICD-10-CM

## 2024-06-19 LAB
ESTIMATED AVG GLUCOSE: 148 MG/DL (ref 68–131)
HBA1C MFR BLD: 6.8 % (ref 4–5.6)

## 2024-06-19 PROCEDURE — 83036 HEMOGLOBIN GLYCOSYLATED A1C: CPT | Performed by: FAMILY MEDICINE

## 2024-06-19 PROCEDURE — 36415 COLL VENOUS BLD VENIPUNCTURE: CPT | Performed by: FAMILY MEDICINE

## 2024-06-27 ENCOUNTER — OFFICE VISIT (OUTPATIENT)
Dept: FAMILY MEDICINE | Facility: CLINIC | Age: 54
End: 2024-06-27
Payer: COMMERCIAL

## 2024-06-27 VITALS
HEIGHT: 70 IN | HEART RATE: 96 BPM | OXYGEN SATURATION: 98 % | RESPIRATION RATE: 18 BRPM | WEIGHT: 216 LBS | BODY MASS INDEX: 30.92 KG/M2 | DIASTOLIC BLOOD PRESSURE: 84 MMHG | SYSTOLIC BLOOD PRESSURE: 136 MMHG

## 2024-06-27 DIAGNOSIS — M79.644 CHRONIC PAIN OF RIGHT THUMB: Primary | ICD-10-CM

## 2024-06-27 DIAGNOSIS — M79.604 LOWER EXTREMITY PAIN, BILATERAL: ICD-10-CM

## 2024-06-27 DIAGNOSIS — G89.29 CHRONIC PAIN OF RIGHT THUMB: Primary | ICD-10-CM

## 2024-06-27 DIAGNOSIS — M79.605 LOWER EXTREMITY PAIN, BILATERAL: ICD-10-CM

## 2024-06-27 DIAGNOSIS — M79.10 MUSCLE PAIN: ICD-10-CM

## 2024-06-27 DIAGNOSIS — E11.9 TYPE 2 DIABETES MELLITUS WITHOUT COMPLICATION, WITHOUT LONG-TERM CURRENT USE OF INSULIN: ICD-10-CM

## 2024-06-27 DIAGNOSIS — M54.50 LOW BACK PAIN POTENTIALLY ASSOCIATED WITH RADICULOPATHY: ICD-10-CM

## 2024-06-27 PROCEDURE — 4010F ACE/ARB THERAPY RXD/TAKEN: CPT | Mod: CPTII,S$GLB,, | Performed by: FAMILY MEDICINE

## 2024-06-27 PROCEDURE — 3044F HG A1C LEVEL LT 7.0%: CPT | Mod: CPTII,S$GLB,, | Performed by: FAMILY MEDICINE

## 2024-06-27 PROCEDURE — 3008F BODY MASS INDEX DOCD: CPT | Mod: CPTII,S$GLB,, | Performed by: FAMILY MEDICINE

## 2024-06-27 PROCEDURE — 3079F DIAST BP 80-89 MM HG: CPT | Mod: CPTII,S$GLB,, | Performed by: FAMILY MEDICINE

## 2024-06-27 PROCEDURE — 3075F SYST BP GE 130 - 139MM HG: CPT | Mod: CPTII,S$GLB,, | Performed by: FAMILY MEDICINE

## 2024-06-27 PROCEDURE — 99214 OFFICE O/P EST MOD 30 MIN: CPT | Mod: S$GLB,,, | Performed by: FAMILY MEDICINE

## 2024-06-27 PROCEDURE — 1159F MED LIST DOCD IN RCRD: CPT | Mod: CPTII,S$GLB,, | Performed by: FAMILY MEDICINE

## 2024-06-27 PROCEDURE — 99999 PR PBB SHADOW E&M-EST. PATIENT-LVL V: CPT | Mod: PBBFAC,,, | Performed by: FAMILY MEDICINE

## 2024-06-27 PROCEDURE — 1160F RVW MEDS BY RX/DR IN RCRD: CPT | Mod: CPTII,S$GLB,, | Performed by: FAMILY MEDICINE

## 2024-06-27 NOTE — PROGRESS NOTES
Subjective:       Patient ID: Umberto Okeefe is a 54 y.o. male.    Chief Complaint: Follow-up (Arthritis in hands/Discuss referral for hands)    Diabetes:  Pt is compliant with therapy.  Diabetes Medications       semaglutide (OZEMPIC) 1 mg/dose (4 mg/3 mL) Inject 1 mg into the skin   every 7 days.    Levemir 100 units/mL SubQ pen Inject 50 Units into the skin every evening.   Lab Results       Component                Value               Date                       HGBA1C                   6.8 (H)             06/19/2024              He has no concerns or complaints.         Leg pain bilateral and easy fatigability. After walking a specific distance he will notice his legs almost feel week. He would like his blood flow checked.         .  Patient Active Problem List   Diagnosis    Type 2 diabetes mellitus without complication, without long-term current use of insulin    Essential hypertension    Other male erectile dysfunction    Achilles rupture, right     Umberto has a current medication list which includes the following prescription(s): atorvastatin, clotrimazole, diclofenac sodium, gabapentin, basaglar kwikpen u-100 insulin, lisinopril, pen needle, diabetic, ozempic, tadalafil, thin lancets, true metrix glucose meter, true metrix glucose test strip, and cetirizine.    Review of Systems   Constitutional:  Negative for activity change, appetite change, fatigue and fever.   Respiratory:  Negative for shortness of breath.    Gastrointestinal:  Negative for abdominal pain.   Integumentary:  Negative for rash.         Health Maintenance Due   Topic Date Due    HIV Screening  Never done    Pneumococcal Vaccines (Age 0-64) (2 of 2 - PPSV23 or PCV20) 12/09/2019    Shingles Vaccine (1 of 2) Never done    Eye Exam  12/14/2022    COVID-19 Vaccine (4 - 2023-24 season) 09/01/2023    Colorectal Cancer Screening  08/25/2024      Health Maintenance reviewed and discussed- .   Objective:      Physical Exam  Vitals and nursing  note reviewed.   Constitutional:       General: He is not in acute distress.     Appearance: He is not ill-appearing.   Cardiovascular:      Rate and Rhythm: Normal rate and regular rhythm.      Heart sounds: No murmur heard.  Pulmonary:      Effort: Pulmonary effort is normal.      Breath sounds: Normal breath sounds. No wheezing.   Skin:     General: Skin is warm and dry.      Findings: No rash.   Neurological:      Mental Status: He is alert.   Psychiatric:         Mood and Affect: Mood normal.         Behavior: Behavior normal.         Assessment:       1. Chronic pain of right thumb    2. Muscle pain    3. Lower extremity pain, bilateral    4. Low back pain potentially associated with radiculopathy    5. Type 2 diabetes mellitus without complication, without long-term current use of insulin        Plan:       1. Chronic pain of right thumb  -     Ambulatory referral/consult to Orthopedics; Future; Expected date: 07/04/2024    2. Muscle pain  -     Sedimentation rate; Future; Expected date: 06/27/2024  -     C-Reactive Protein; Future; Expected date: 06/27/2024  -     CK; Future; Expected date: 06/27/2024  -     Comprehensive Metabolic Panel; Future; Expected date: 06/27/2024    3. Lower extremity pain, bilateral  -     EMG W/ ULTRASOUND AND NERVE CONDUCTION TEST 2 Extremities; Future; Expected date: 06/27/2024    4. Low back pain potentially associated with radiculopathy  -     X-Ray Lumbar Complete Including Flex And Ext; Future; Expected date: 06/27/2024    5. Type 2 diabetes mellitus without complication, without long-term current use of insulin  Assessment & Plan:  Stable. Well controlled. Continue current medications.

## 2024-06-28 ENCOUNTER — HOSPITAL ENCOUNTER (OUTPATIENT)
Dept: RADIOLOGY | Facility: HOSPITAL | Age: 54
Discharge: HOME | End: 2024-06-28
Attending: FAMILY MEDICINE
Payer: COMMERCIAL

## 2024-06-28 DIAGNOSIS — M54.50 LOW BACK PAIN POTENTIALLY ASSOCIATED WITH RADICULOPATHY: ICD-10-CM

## 2024-06-28 PROCEDURE — 72114 X-RAY EXAM L-S SPINE BENDING: CPT | Mod: TC

## 2024-06-28 PROCEDURE — 72114 X-RAY EXAM L-S SPINE BENDING: CPT | Mod: 26,,, | Performed by: RADIOLOGY

## 2024-07-05 RX ORDER — GABAPENTIN 800 MG/1
800 TABLET ORAL 3 TIMES DAILY
Qty: 90 TABLET | Refills: 6 | Status: SHIPPED | OUTPATIENT
Start: 2024-07-05

## 2024-07-05 NOTE — TELEPHONE ENCOUNTER
Please see the attached refill request.    Last office visit was 02/15/2024.    Thank you,   KARLA Cano

## 2024-09-09 ENCOUNTER — PATIENT MESSAGE (OUTPATIENT)
Dept: ADMINISTRATIVE | Facility: HOSPITAL | Age: 54
End: 2024-09-09
Payer: COMMERCIAL

## 2024-09-30 ENCOUNTER — OFFICE VISIT (OUTPATIENT)
Dept: FAMILY MEDICINE | Facility: CLINIC | Age: 54
End: 2024-09-30
Payer: COMMERCIAL

## 2024-09-30 VITALS
BODY MASS INDEX: 31.23 KG/M2 | HEART RATE: 74 BPM | HEIGHT: 70 IN | RESPIRATION RATE: 18 BRPM | OXYGEN SATURATION: 98 % | WEIGHT: 218.13 LBS | DIASTOLIC BLOOD PRESSURE: 86 MMHG | SYSTOLIC BLOOD PRESSURE: 110 MMHG

## 2024-09-30 DIAGNOSIS — I10 ESSENTIAL HYPERTENSION: ICD-10-CM

## 2024-09-30 DIAGNOSIS — Z00.00 HEALTHCARE MAINTENANCE: ICD-10-CM

## 2024-09-30 DIAGNOSIS — M54.2 CHRONIC NECK PAIN: ICD-10-CM

## 2024-09-30 DIAGNOSIS — Z12.11 SCREENING FOR COLORECTAL CANCER: ICD-10-CM

## 2024-09-30 DIAGNOSIS — G89.29 CHRONIC NECK PAIN: ICD-10-CM

## 2024-09-30 DIAGNOSIS — E11.9 TYPE 2 DIABETES MELLITUS WITHOUT COMPLICATION, WITHOUT LONG-TERM CURRENT USE OF INSULIN: ICD-10-CM

## 2024-09-30 DIAGNOSIS — M54.2 NECK PAIN: Primary | ICD-10-CM

## 2024-09-30 DIAGNOSIS — Z12.12 SCREENING FOR COLORECTAL CANCER: ICD-10-CM

## 2024-09-30 PROBLEM — G25.0 ESSENTIAL TREMOR: Status: ACTIVE | Noted: 2024-09-30

## 2024-09-30 PROBLEM — G62.9 PERIPHERAL NERVE DISEASE: Status: ACTIVE | Noted: 2024-09-30

## 2024-09-30 PROCEDURE — 99214 OFFICE O/P EST MOD 30 MIN: CPT | Mod: 25,S$GLB,, | Performed by: FAMILY MEDICINE

## 2024-09-30 PROCEDURE — 1159F MED LIST DOCD IN RCRD: CPT | Mod: CPTII,S$GLB,, | Performed by: FAMILY MEDICINE

## 2024-09-30 PROCEDURE — 90471 IMMUNIZATION ADMIN: CPT | Mod: S$GLB,,, | Performed by: FAMILY MEDICINE

## 2024-09-30 PROCEDURE — 3008F BODY MASS INDEX DOCD: CPT | Mod: CPTII,S$GLB,, | Performed by: FAMILY MEDICINE

## 2024-09-30 PROCEDURE — 99999 PR PBB SHADOW E&M-EST. PATIENT-LVL V: CPT | Mod: PBBFAC,,, | Performed by: FAMILY MEDICINE

## 2024-09-30 PROCEDURE — 3044F HG A1C LEVEL LT 7.0%: CPT | Mod: CPTII,S$GLB,, | Performed by: FAMILY MEDICINE

## 2024-09-30 PROCEDURE — 3074F SYST BP LT 130 MM HG: CPT | Mod: CPTII,S$GLB,, | Performed by: FAMILY MEDICINE

## 2024-09-30 PROCEDURE — 90656 IIV3 VACC NO PRSV 0.5 ML IM: CPT | Mod: S$GLB,,, | Performed by: FAMILY MEDICINE

## 2024-09-30 PROCEDURE — 1160F RVW MEDS BY RX/DR IN RCRD: CPT | Mod: CPTII,S$GLB,, | Performed by: FAMILY MEDICINE

## 2024-09-30 PROCEDURE — 3079F DIAST BP 80-89 MM HG: CPT | Mod: CPTII,S$GLB,, | Performed by: FAMILY MEDICINE

## 2024-09-30 PROCEDURE — 4010F ACE/ARB THERAPY RXD/TAKEN: CPT | Mod: CPTII,S$GLB,, | Performed by: FAMILY MEDICINE

## 2024-09-30 NOTE — PATIENT INSTRUCTIONS
Thank you for allowing me to participate in your care today. It is an honor to be a part of your healthcare team at [facility]. If you had labs ordered today, you will receive notification via [facility], phone call or mailed letter regarding your results within 7 days. If you have any questions or concerns regarding your visit today, please do not hesitate to contact us.  Sincerely,   Hortencia Mar M.D.

## 2024-09-30 NOTE — PROGRESS NOTES
Subjective:       Patient ID: Umberto Okeefe is a 54 y.o. male.    Chief Complaint: Follow-up    Diabetes:  Pt is compliant with therapy.  Diabetes Medications       semaglutide (OZEMPIC) 1 mg/dose (4 mg/3 mL) Inject 1 mg into the skin   every 7 days.    Levemir 100 units/mL SubQ pen Inject 50 Units into the skin every evening.   Lab Results       Component                Value               Date                       HGBA1C                   6.8 (H)             06/19/2024                He has no concerns or complaints.         Leg pain bilateral and easy fatigability. After walking a specific distance he will notice his legs almost feel week. He would like his blood flow checked.     Follow-up  Pertinent negatives include no abdominal pain, fatigue, fever or rash.       .  Patient Active Problem List   Diagnosis    Type 2 diabetes mellitus without complication, without long-term current use of insulin    Essential hypertension    Other male erectile dysfunction    Achilles rupture, right    Essential tremor    Peripheral nerve disease     Umberto has a current medication list which includes the following prescription(s): atorvastatin, clotrimazole, diclofenac sodium, gabapentin, basaglar kwikpen u-100 insulin, lisinopril, pen needle, diabetic, ozempic, tadalafil, thin lancets, true metrix glucose meter, true metrix glucose test strip, and cetirizine.    Review of Systems   Constitutional:  Negative for activity change, appetite change, fatigue and fever.   Respiratory:  Negative for shortness of breath.    Gastrointestinal:  Negative for abdominal pain.   Integumentary:  Negative for rash.         Health Maintenance Due   Topic Date Due    HIV Screening  Never done    Pneumococcal Vaccines (Age 0-64) (2 of 2 - PPSV23 or PCV20) 12/09/2019    Shingles Vaccine (1 of 2) Never done    Eye Exam  12/14/2022    Colorectal Cancer Screening  08/25/2024    Influenza Vaccine (1) 09/01/2024    COVID-19 Vaccine (4 - 2024-25  season) 09/01/2024    Diabetes Urine Screening  09/08/2024    Lipid Panel  09/08/2024      Health Maintenance reviewed and discussed- Labs ordered and scheduled.   Objective:      Physical Exam  Vitals and nursing note reviewed.   Constitutional:       General: He is not in acute distress.     Appearance: He is not ill-appearing.   Cardiovascular:      Rate and Rhythm: Normal rate and regular rhythm.      Heart sounds: No murmur heard.  Pulmonary:      Effort: Pulmonary effort is normal.      Breath sounds: Normal breath sounds. No wheezing.   Skin:     General: Skin is warm and dry.      Findings: No rash.   Neurological:      Mental Status: He is alert.   Psychiatric:         Mood and Affect: Mood normal.         Behavior: Behavior normal.         Assessment:       1. Neck pain    2. Screening for colorectal cancer    3. Chronic neck pain    4. Essential hypertension    5. Type 2 diabetes mellitus without complication, without long-term current use of insulin        Plan:       1. Neck pain  -     X-Ray Cervical Spine AP And Lateral; Future; Expected date: 09/30/2024    2. Screening for colorectal cancer  -     Cologuard Screening (Multitarget Stool DNA); Future; Expected date: 09/30/2024    3. Chronic neck pain  -     X-Ray Cervical Spine AP And Lateral; Future; Expected date: 09/30/2024    4. Essential hypertension  Assessment & Plan:  Stable. Well controlled. Continue current medications.         5. Type 2 diabetes mellitus without complication, without long-term current use of insulin  Assessment & Plan:  Stable. Well controlled. Continue current medications.   Labs ordered for December.

## 2024-10-01 ENCOUNTER — HOSPITAL ENCOUNTER (OUTPATIENT)
Dept: RADIOLOGY | Facility: HOSPITAL | Age: 54
Discharge: HOME | End: 2024-10-01
Attending: FAMILY MEDICINE
Payer: COMMERCIAL

## 2024-10-01 DIAGNOSIS — M54.2 CHRONIC NECK PAIN: ICD-10-CM

## 2024-10-01 DIAGNOSIS — M54.2 NECK PAIN: ICD-10-CM

## 2024-10-01 DIAGNOSIS — G89.29 CHRONIC NECK PAIN: ICD-10-CM

## 2024-10-01 PROCEDURE — 72040 X-RAY EXAM NECK SPINE 2-3 VW: CPT | Mod: 26,,, | Performed by: RADIOLOGY

## 2024-10-01 PROCEDURE — 72040 X-RAY EXAM NECK SPINE 2-3 VW: CPT | Mod: TC

## 2024-10-23 ENCOUNTER — PATIENT MESSAGE (OUTPATIENT)
Dept: FAMILY MEDICINE | Facility: CLINIC | Age: 54
End: 2024-10-23
Payer: COMMERCIAL

## 2024-11-10 ENCOUNTER — HOSPITAL ENCOUNTER (EMERGENCY)
Facility: HOSPITAL | Age: 54
Discharge: HOME | End: 2024-11-10
Attending: EMERGENCY MEDICINE
Payer: COMMERCIAL

## 2024-11-10 VITALS
HEART RATE: 77 BPM | RESPIRATION RATE: 17 BRPM | BODY MASS INDEX: 30.92 KG/M2 | HEIGHT: 70 IN | WEIGHT: 216 LBS | DIASTOLIC BLOOD PRESSURE: 91 MMHG | SYSTOLIC BLOOD PRESSURE: 156 MMHG | TEMPERATURE: 98 F | OXYGEN SATURATION: 96 %

## 2024-11-10 DIAGNOSIS — R06.02 SHORTNESS OF BREATH: ICD-10-CM

## 2024-11-10 DIAGNOSIS — R06.02 SOB (SHORTNESS OF BREATH): Primary | ICD-10-CM

## 2024-11-10 DIAGNOSIS — R06.00 DYSPNEA, UNSPECIFIED TYPE: ICD-10-CM

## 2024-11-10 DIAGNOSIS — R07.9 CHEST PAIN: ICD-10-CM

## 2024-11-10 LAB
ALBUMIN SERPL BCP-MCNC: 4.1 G/DL (ref 3.5–5.2)
ALP SERPL-CCNC: 73 U/L (ref 40–150)
ALT SERPL W/O P-5'-P-CCNC: 30 U/L (ref 10–44)
ANION GAP SERPL CALC-SCNC: 11 MMOL/L (ref 8–16)
AST SERPL-CCNC: 23 U/L (ref 10–40)
BASOPHILS # BLD AUTO: 0.05 K/UL (ref 0–0.2)
BASOPHILS NFR BLD: 0.8 % (ref 0–1.9)
BILIRUB SERPL-MCNC: 0.5 MG/DL (ref 0.1–1)
BNP SERPL-MCNC: <10 PG/ML (ref 0–99)
BUN SERPL-MCNC: 14 MG/DL (ref 6–20)
CALCIUM SERPL-MCNC: 9.4 MG/DL (ref 8.7–10.5)
CHLORIDE SERPL-SCNC: 105 MMOL/L (ref 95–110)
CO2 SERPL-SCNC: 21 MMOL/L (ref 23–29)
CREAT SERPL-MCNC: 1.1 MG/DL (ref 0.5–1.4)
DIFFERENTIAL METHOD BLD: ABNORMAL
EOSINOPHIL # BLD AUTO: 0.2 K/UL (ref 0–0.5)
EOSINOPHIL NFR BLD: 2.4 % (ref 0–8)
ERYTHROCYTE [DISTWIDTH] IN BLOOD BY AUTOMATED COUNT: 11.8 % (ref 11.5–14.5)
EST. GFR  (NO RACE VARIABLE): >60 ML/MIN/1.73 M^2
GLUCOSE SERPL-MCNC: 106 MG/DL (ref 70–110)
HCT VFR BLD AUTO: 42.5 % (ref 40–54)
HGB BLD-MCNC: 15 G/DL (ref 14–18)
IMM GRANULOCYTES # BLD AUTO: 0.02 K/UL (ref 0–0.04)
IMM GRANULOCYTES NFR BLD AUTO: 0.3 % (ref 0–0.5)
LYMPHOCYTES # BLD AUTO: 1.5 K/UL (ref 1–4.8)
LYMPHOCYTES NFR BLD: 24.3 % (ref 18–48)
MCH RBC QN AUTO: 30.7 PG (ref 27–31)
MCHC RBC AUTO-ENTMCNC: 35.3 G/DL (ref 32–36)
MCV RBC AUTO: 87 FL (ref 82–98)
MONOCYTES # BLD AUTO: 0.8 K/UL (ref 0.3–1)
MONOCYTES NFR BLD: 11.8 % (ref 4–15)
NEUTROPHILS # BLD AUTO: 3.8 K/UL (ref 1.8–7.7)
NEUTROPHILS NFR BLD: 60.4 % (ref 38–73)
NRBC BLD-RTO: 0 /100 WBC
PLATELET # BLD AUTO: 330 K/UL (ref 150–450)
PMV BLD AUTO: 8.5 FL (ref 9.2–12.9)
POTASSIUM SERPL-SCNC: 4 MMOL/L (ref 3.5–5.1)
PROT SERPL-MCNC: 7.7 G/DL (ref 6–8.4)
RBC # BLD AUTO: 4.89 M/UL (ref 4.6–6.2)
SODIUM SERPL-SCNC: 137 MMOL/L (ref 136–145)
TROPONIN I SERPL DL<=0.01 NG/ML-MCNC: <0.006 NG/ML (ref 0–0.03)
WBC # BLD AUTO: 6.34 K/UL (ref 3.9–12.7)

## 2024-11-10 PROCEDURE — 93010 ELECTROCARDIOGRAM REPORT: CPT | Mod: ,,, | Performed by: INTERNAL MEDICINE

## 2024-11-10 PROCEDURE — 84484 ASSAY OF TROPONIN QUANT: CPT | Performed by: EMERGENCY MEDICINE

## 2024-11-10 PROCEDURE — 85025 COMPLETE CBC W/AUTO DIFF WBC: CPT | Performed by: EMERGENCY MEDICINE

## 2024-11-10 PROCEDURE — 93005 ELECTROCARDIOGRAM TRACING: CPT

## 2024-11-10 PROCEDURE — 99285 EMERGENCY DEPT VISIT HI MDM: CPT | Mod: 25

## 2024-11-10 PROCEDURE — 80053 COMPREHEN METABOLIC PANEL: CPT | Performed by: EMERGENCY MEDICINE

## 2024-11-10 PROCEDURE — 71046 X-RAY EXAM CHEST 2 VIEWS: CPT | Mod: 26,,, | Performed by: RADIOLOGY

## 2024-11-10 PROCEDURE — 94760 N-INVAS EAR/PLS OXIMETRY 1: CPT

## 2024-11-10 PROCEDURE — 83880 ASSAY OF NATRIURETIC PEPTIDE: CPT | Performed by: EMERGENCY MEDICINE

## 2024-11-10 PROCEDURE — 71046 X-RAY EXAM CHEST 2 VIEWS: CPT | Mod: TC

## 2024-11-10 RX ORDER — ALBUTEROL SULFATE 90 UG/1
1-2 INHALANT RESPIRATORY (INHALATION) EVERY 6 HOURS PRN
Qty: 1 G | Refills: 1 | Status: SHIPPED | OUTPATIENT
Start: 2024-11-10

## 2024-11-10 NOTE — ED NOTES
"Pt reports onset x1 week with fatigue, intermittent chills, infrequent dry weak cough, intermittent nausea, intermittent SOB, chest pain rated 5/10 described with "fluttering" sensation, intermittent left arm pain, and restlessness. Pt reports mild nausea, but denies shortness of breath and chest pain at this time.  "

## 2024-11-11 LAB
OHS QRS DURATION: 80 MS
OHS QTC CALCULATION: 446 MS

## 2024-11-11 NOTE — ED PROVIDER NOTES
Encounter Date: 11/10/2024       History     Chief Complaint   Patient presents with    Shortness of Breath     Well-developed 54-year-old male comes emergency with generalized shortness of breath over the last 5-6 months.  This was associated with some left arm pain and sharp stabbing pain of the left arm last night.  Was concerned and came to the emergency room for further evaluation.  Typically healthy otherwise.  Past medical history restless legs syndrome, neuropathy, hypertension, GERD, diabetes.  He is compliant with his medications.  The patient does not see a cardiologist.  He used to have a primary care physician, Dr. Alba but he has left town.  The patient appears stable this time.  Vital signs are stable all the the O2 saturation is 94%.      Review of patient's allergies indicates:   Allergen Reactions    Topamax [topiramate]      Mental agitation     Montelukast Nausea And Vomiting     Past Medical History:   Diagnosis Date    Diabetes mellitus, type 2     GERD (gastroesophageal reflux disease)     Hypertension     Neuropathy     Restless leg syndrome      Past Surgical History:   Procedure Laterality Date    KNEE SURGERY      REPAIR OF ACHILLES TENDON Right 2022    Procedure: REPAIR, TENDON, ACHILLES;  Surgeon: Andrew Lawson DO;  Location: [facility] OR;  Service: Orthopedics;  Laterality: Right;     Family History   Problem Relation Name Age of Onset    Kidney disease Mother Brandi Okeefe     Arthritis Mother Brandi Okeefe     Hearing loss Mother Brandi Okeefe     Kidney disease Maternal Uncle       Social History     Tobacco Use    Smoking status: Former     Current packs/day: 0.00     Average packs/day: 1 pack/day for 15.0 years (15.0 ttl pk-yrs)     Types: Cigarettes     Start date: 1995     Quit date: 2010     Years since quittin.4    Smokeless tobacco: Never   Substance Use Topics    Alcohol use: No    Drug use: No     Review of Systems   Constitutional:  Negative  for fever.        No recent weight gain or weight loss.   HENT:  Negative for sore throat.    Respiratory:  Positive for shortness of breath.    Cardiovascular:  Positive for chest pain (Left arm left chest pain.).   Gastrointestinal:  Negative for nausea.   Genitourinary:  Negative for dysuria.   Musculoskeletal:  Negative for back pain.   Skin:  Negative for rash.   Neurological:  Negative for weakness.   Hematological:  Does not bruise/bleed easily.   All other systems reviewed and are negative.      Physical Exam     Initial Vitals [11/10/24 1543]   BP Pulse Resp Temp SpO2   (!) 179/101 85 (!) 22 98.3 °F (36.8 °C) 97 %      MAP       --         Physical Exam    Nursing note and vitals reviewed.  Constitutional: He appears well-developed and well-nourished.   HENT:   Head: Normocephalic and atraumatic.   Eyes: EOM are normal. Pupils are equal, round, and reactive to light. No scleral icterus.   Neck: Neck supple.   Normal range of motion.  Cardiovascular:  Normal rate, regular rhythm and normal heart sounds.           No murmur heard.  Pulmonary/Chest: Breath sounds normal. He has no wheezes. He has no rales. He exhibits no tenderness.   Abdominal: Abdomen is soft. Bowel sounds are normal.   Musculoskeletal:         General: Normal range of motion.      Cervical back: Normal range of motion and neck supple.     Neurological: He is alert and oriented to person, place, and time. He has normal strength. GCS score is 15. GCS eye subscore is 4. GCS verbal subscore is 5. GCS motor subscore is 6.   Skin: Skin is warm and dry.   Psychiatric: He has a normal mood and affect. His behavior is normal. Judgment and thought content normal.         ED Course   Procedures  Labs Reviewed   CBC W/ AUTO DIFFERENTIAL - Abnormal       Result Value    WBC 6.34      RBC 4.89      Hemoglobin 15.0      Hematocrit 42.5      MCV 87      MCH 30.7      MCHC 35.3      RDW 11.8      Platelets 330      MPV 8.5 (*)     Immature Granulocytes 0.3       Gran # (ANC) 3.8      Immature Grans (Abs) 0.02      Lymph # 1.5      Mono # 0.8      Eos # 0.2      Baso # 0.05      nRBC 0      Gran % 60.4      Lymph % 24.3      Mono % 11.8      Eosinophil % 2.4      Basophil % 0.8      Differential Method Automated     COMPREHENSIVE METABOLIC PANEL - Abnormal    Sodium 137      Potassium 4.0      Chloride 105      CO2 21 (*)     Glucose 106      BUN 14      Creatinine 1.1      Calcium 9.4      Total Protein 7.7      Albumin 4.1      Total Bilirubin 0.5      Alkaline Phosphatase 73      AST 23      ALT 30      eGFR >60.0      Anion Gap 11     TROPONIN I    Troponin I <0.006     B-TYPE NATRIURETIC PEPTIDE    BNP <10       EKG Readings: (Independently Interpreted)   Rate, 83 sinus rhythm with first-degree AV block with a GA interval of 216 milliseconds.  Mild rightward axis, normal QRS, normal T-wave, abnormal EKG.       Imaging Results              X-Ray Chest PA And Lateral (Final result)  Result time 11/10/24 16:32:48      Final result by Cathy Zambrano MD (11/10/24 16:32:48)                   Impression:      No acute cardiopulmonary abnormality.      Electronically signed by: Cathy Zambrano  Date:    11/10/2024  Time:    16:32               Narrative:    EXAMINATION:  XR CHEST PA AND LATERAL    CLINICAL HISTORY:  Chest Pain;    TECHNIQUE:  PA and lateral views of the chest were performed.    COMPARISON:  Chest radiograph 10/27/2023    FINDINGS:  The lungs are clear.  No focal consolidation, pleural effusion or pneumothorax.    Normal cardiomediastinal contour.    No acute or aggressive osseous abnormality.                                       Medications - No data to display  Medical Decision Making  MI, STEMI, anxiety reaction, costochondritis, PE, pneumothorax, angina, cancer, fracture      Amount and/or Complexity of Data Reviewed  Labs: ordered.  Radiology: ordered.  Discussion of management or test interpretation with external provider(s): The patient is stable this  time.  Negative EKG, negative x-ray, negative laboratory values.  I will discharge patient home to follow up with the primary care physician and a cardiologist for a stress test.  I explained this to the patient and his wife.  They both understand and readily agree with this plan.  Discharge pending at this time.  The patient clearly understands return emergency room if develops any new chest pain new shortness of breath or any other concerning symptoms.  He does have a history of cardiovascular disease in his family.                                      Clinical Impression:  Final diagnoses:  [R06.02] Shortness of breath  [R07.9] Chest pain  [R06.02] SOB (shortness of breath) (Primary)  [R06.00] Dyspnea, unspecified type          ED Disposition Condition    Discharge Stable          ED Prescriptions       Medication Sig Dispense Start Date End Date Auth. Provider    albuterol (PROVENTIL/VENTOLIN HFA) 90 mcg/actuation inhaler Inhale 1-2 puffs into the lungs every 6 (six) hours as needed for Wheezing. Rescue 1 g 11/10/2024 -- Rj Garner MD          Follow-up Information       Follow up With Specialties Details Why Contact Info    Hortencia Mar MD Family Medicine  As needed, If symptoms worsen [address]  [address]  [place] [address]  [phone]               Rj Garner MD  11/10/24 0615

## 2024-11-11 NOTE — DISCHARGE INSTRUCTIONS
Follow-up with the primary care physician as necessary, you need to follow up with a cardiologist for a stress test.  This needs to occur in the next 6 months or sooner.  Always return emergency room if develops any worsening chest pain shortness of breath fevers chills or any other concerning symptoms.

## 2024-12-09 DIAGNOSIS — I10 ESSENTIAL HYPERTENSION: ICD-10-CM

## 2024-12-09 DIAGNOSIS — E11.9 TYPE 2 DIABETES MELLITUS WITHOUT COMPLICATION, WITHOUT LONG-TERM CURRENT USE OF INSULIN: ICD-10-CM

## 2024-12-09 RX ORDER — LISINOPRIL 10 MG/1
10 TABLET ORAL
Qty: 90 TABLET | Refills: 0 | Status: SHIPPED | OUTPATIENT
Start: 2024-12-09

## 2024-12-09 RX ORDER — ATORVASTATIN CALCIUM 40 MG/1
40 TABLET, FILM COATED ORAL
Qty: 90 TABLET | Refills: 0 | Status: SHIPPED | OUTPATIENT
Start: 2024-12-09

## 2024-12-09 NOTE — TELEPHONE ENCOUNTER
Care Due:                  Date            Visit Type   Department     Provider  --------------------------------------------------------------------------------                                EP -                              [facility] FAMILY  Last Visit: 09-      CARE ([facility])   MEDICINE       Hortencia Mar                              [facility] FAMILY  Next Visit: 01-      CARE ([facility])   MEDICINE       Hortencia Mar                                                            Last  Test          Frequency    Reason                     Performed    Due Date  --------------------------------------------------------------------------------    HBA1C.......  6 months...  insulin, semaglutide.....  06- 12-    Lipid Panel.  12 months..  atorvastatin.............  09- 09-    [facility] Embedded Care Due Messages. Reference number: [id number].   12/09/2024 5:31:21 AM CST

## 2024-12-09 NOTE — TELEPHONE ENCOUNTER
Refill Routing Note   Medication(s) are not appropriate for processing by [facility] for the following reason(s):        Required labs outdated  Required vitals abnormal  ED/Hospital Visit since last OV with provider    [facility] action(s):  Defer     Requires labs : Yes             Appointments  past 12m or future 3m with PCP    Date Provider   Last Visit   9/30/2024 Hortencia Mar MD   Next Visit   1/2/2025 Hortencia Mar MD   ED visits in past 90 days: 1        Note composed:5:40 PM 12/09/2024

## 2024-12-30 ENCOUNTER — LAB VISIT (OUTPATIENT)
Dept: LAB | Facility: HOSPITAL | Age: 54
End: 2024-12-30
Attending: FAMILY MEDICINE
Payer: COMMERCIAL

## 2024-12-30 DIAGNOSIS — E11.9 TYPE 2 DIABETES MELLITUS WITHOUT COMPLICATION, WITHOUT LONG-TERM CURRENT USE OF INSULIN: ICD-10-CM

## 2024-12-30 DIAGNOSIS — I10 ESSENTIAL HYPERTENSION: ICD-10-CM

## 2024-12-30 DIAGNOSIS — Z12.5 PROSTATE CANCER SCREENING: ICD-10-CM

## 2024-12-30 LAB
ALBUMIN SERPL BCP-MCNC: 3.9 G/DL (ref 3.5–5.2)
ALBUMIN/CREAT UR: 120.1 UG/MG (ref 0–30)
ALP SERPL-CCNC: 81 U/L (ref 40–150)
ALT SERPL W/O P-5'-P-CCNC: 26 U/L (ref 10–44)
ANION GAP SERPL CALC-SCNC: 11 MMOL/L (ref 8–16)
AST SERPL-CCNC: 22 U/L (ref 10–40)
BASOPHILS # BLD AUTO: 0.06 K/UL (ref 0–0.2)
BASOPHILS NFR BLD: 0.8 % (ref 0–1.9)
BILIRUB SERPL-MCNC: 0.5 MG/DL (ref 0.1–1)
BUN SERPL-MCNC: 21 MG/DL (ref 6–20)
CALCIUM SERPL-MCNC: 9.1 MG/DL (ref 8.7–10.5)
CHLORIDE SERPL-SCNC: 103 MMOL/L (ref 95–110)
CHOLEST SERPL-MCNC: 155 MG/DL (ref 120–199)
CHOLEST/HDLC SERPL: 6 {RATIO} (ref 2–5)
CO2 SERPL-SCNC: 22 MMOL/L (ref 23–29)
COMPLEXED PSA SERPL-MCNC: 0.34 NG/ML (ref 0–4)
CREAT SERPL-MCNC: 1.2 MG/DL (ref 0.5–1.4)
CREAT UR-MCNC: 149 MG/DL (ref 23–375)
DIFFERENTIAL METHOD BLD: ABNORMAL
EOSINOPHIL # BLD AUTO: 0.2 K/UL (ref 0–0.5)
EOSINOPHIL NFR BLD: 2.3 % (ref 0–8)
ERYTHROCYTE [DISTWIDTH] IN BLOOD BY AUTOMATED COUNT: 11.9 % (ref 11.5–14.5)
EST. GFR  (NO RACE VARIABLE): >60 ML/MIN/1.73 M^2
ESTIMATED AVG GLUCOSE: 154 MG/DL (ref 68–131)
GLUCOSE SERPL-MCNC: 149 MG/DL (ref 70–110)
HBA1C MFR BLD: 7 % (ref 4–5.6)
HCT VFR BLD AUTO: 44.2 % (ref 40–54)
HDLC SERPL-MCNC: 26 MG/DL (ref 40–75)
HDLC SERPL: 16.8 % (ref 20–50)
HGB BLD-MCNC: 14.9 G/DL (ref 14–18)
IMM GRANULOCYTES # BLD AUTO: 0.02 K/UL (ref 0–0.04)
IMM GRANULOCYTES NFR BLD AUTO: 0.3 % (ref 0–0.5)
LDLC SERPL CALC-MCNC: 97.4 MG/DL (ref 63–159)
LYMPHOCYTES # BLD AUTO: 1.4 K/UL (ref 1–4.8)
LYMPHOCYTES NFR BLD: 19.5 % (ref 18–48)
MCH RBC QN AUTO: 30.1 PG (ref 27–31)
MCHC RBC AUTO-ENTMCNC: 33.7 G/DL (ref 32–36)
MCV RBC AUTO: 89 FL (ref 82–98)
MICROALBUMIN UR DL<=1MG/L-MCNC: 179 UG/ML
MONOCYTES # BLD AUTO: 0.8 K/UL (ref 0.3–1)
MONOCYTES NFR BLD: 11 % (ref 4–15)
NEUTROPHILS # BLD AUTO: 4.9 K/UL (ref 1.8–7.7)
NEUTROPHILS NFR BLD: 66.1 % (ref 38–73)
NONHDLC SERPL-MCNC: 129 MG/DL
NRBC BLD-RTO: 0 /100 WBC
PLATELET # BLD AUTO: 311 K/UL (ref 150–450)
PMV BLD AUTO: 8.8 FL (ref 9.2–12.9)
POTASSIUM SERPL-SCNC: 4.1 MMOL/L (ref 3.5–5.1)
PROT SERPL-MCNC: 7.5 G/DL (ref 6–8.4)
RBC # BLD AUTO: 4.95 M/UL (ref 4.6–6.2)
SODIUM SERPL-SCNC: 136 MMOL/L (ref 136–145)
TRIGL SERPL-MCNC: 158 MG/DL (ref 30–150)
TSH SERPL DL<=0.005 MIU/L-ACNC: 1.24 UIU/ML (ref 0.4–4)
WBC # BLD AUTO: 7.34 K/UL (ref 3.9–12.7)

## 2024-12-30 PROCEDURE — 36415 COLL VENOUS BLD VENIPUNCTURE: CPT | Performed by: FAMILY MEDICINE

## 2024-12-30 PROCEDURE — 84153 ASSAY OF PSA TOTAL: CPT | Performed by: FAMILY MEDICINE

## 2024-12-30 PROCEDURE — 83036 HEMOGLOBIN GLYCOSYLATED A1C: CPT | Performed by: FAMILY MEDICINE

## 2024-12-30 PROCEDURE — 80053 COMPREHEN METABOLIC PANEL: CPT | Performed by: FAMILY MEDICINE

## 2024-12-30 PROCEDURE — 82570 ASSAY OF URINE CREATININE: CPT | Performed by: FAMILY MEDICINE

## 2024-12-30 PROCEDURE — 80061 LIPID PANEL: CPT | Performed by: FAMILY MEDICINE

## 2024-12-30 PROCEDURE — 84443 ASSAY THYROID STIM HORMONE: CPT | Performed by: FAMILY MEDICINE

## 2024-12-30 PROCEDURE — 85025 COMPLETE CBC W/AUTO DIFF WBC: CPT | Performed by: FAMILY MEDICINE

## 2025-01-06 ENCOUNTER — OFFICE VISIT (OUTPATIENT)
Dept: FAMILY MEDICINE | Facility: CLINIC | Age: 55
End: 2025-01-06
Payer: COMMERCIAL

## 2025-01-06 ENCOUNTER — TELEPHONE (OUTPATIENT)
Dept: FAMILY MEDICINE | Facility: CLINIC | Age: 55
End: 2025-01-06

## 2025-01-06 VITALS
WEIGHT: 223.63 LBS | BODY MASS INDEX: 32.02 KG/M2 | SYSTOLIC BLOOD PRESSURE: 110 MMHG | HEART RATE: 86 BPM | HEIGHT: 70 IN | RESPIRATION RATE: 18 BRPM | DIASTOLIC BLOOD PRESSURE: 86 MMHG | OXYGEN SATURATION: 96 %

## 2025-01-06 DIAGNOSIS — E11.9 TYPE 2 DIABETES MELLITUS WITHOUT COMPLICATION, WITHOUT LONG-TERM CURRENT USE OF INSULIN: ICD-10-CM

## 2025-01-06 DIAGNOSIS — I10 ESSENTIAL HYPERTENSION: ICD-10-CM

## 2025-01-06 DIAGNOSIS — R06.00 DYSPNEA, UNSPECIFIED TYPE: ICD-10-CM

## 2025-01-06 PROCEDURE — 99999 PR PBB SHADOW E&M-EST. PATIENT-LVL IV: CPT | Mod: PBBFAC,,, | Performed by: FAMILY MEDICINE

## 2025-01-06 PROCEDURE — 3074F SYST BP LT 130 MM HG: CPT | Mod: CPTII,S$GLB,, | Performed by: FAMILY MEDICINE

## 2025-01-06 PROCEDURE — 1159F MED LIST DOCD IN RCRD: CPT | Mod: CPTII,S$GLB,, | Performed by: FAMILY MEDICINE

## 2025-01-06 PROCEDURE — 1160F RVW MEDS BY RX/DR IN RCRD: CPT | Mod: CPTII,S$GLB,, | Performed by: FAMILY MEDICINE

## 2025-01-06 PROCEDURE — 3079F DIAST BP 80-89 MM HG: CPT | Mod: CPTII,S$GLB,, | Performed by: FAMILY MEDICINE

## 2025-01-06 PROCEDURE — 99214 OFFICE O/P EST MOD 30 MIN: CPT | Mod: S$GLB,,, | Performed by: FAMILY MEDICINE

## 2025-01-06 PROCEDURE — 4010F ACE/ARB THERAPY RXD/TAKEN: CPT | Mod: CPTII,S$GLB,, | Performed by: FAMILY MEDICINE

## 2025-01-06 PROCEDURE — 3008F BODY MASS INDEX DOCD: CPT | Mod: CPTII,S$GLB,, | Performed by: FAMILY MEDICINE

## 2025-01-06 RX ORDER — ALBUTEROL SULFATE 90 UG/1
1-2 INHALANT RESPIRATORY (INHALATION) EVERY 6 HOURS PRN
Qty: 18 G | Refills: 1 | Status: SHIPPED | OUTPATIENT
Start: 2025-01-06

## 2025-01-06 RX ORDER — ATORVASTATIN CALCIUM 40 MG/1
40 TABLET, FILM COATED ORAL DAILY
Qty: 90 TABLET | Refills: 3 | Status: SHIPPED | OUTPATIENT
Start: 2025-01-06

## 2025-01-06 RX ORDER — LISINOPRIL 10 MG/1
10 TABLET ORAL DAILY
Qty: 90 TABLET | Refills: 3 | Status: SHIPPED | OUTPATIENT
Start: 2025-01-06

## 2025-01-06 NOTE — PROGRESS NOTES
Subjective:       Patient ID: Umberto Okeefe is a 54 y.o. male.    Chief Complaint: Follow-up    Diabetes:  Pt is compliant with therapy.  Diabetes Medications       semaglutide (OZEMPIC) 1 mg/dose (4 mg/3 mL) Inject 1 mg into the skin   every 7 days.    Levemir 100 units/mL SubQ pen Inject 50 Units into the skin every evening.   Lab Results       Component                Value               Date                       HGBA1C                   7.0 (H)             12/30/2024            He has no concerns or complaints.         Leg pain bilateral and easy fatigability. After walking a specific distance he will notice his legs almost feel week. He had some nerve conduction studies completed at [facility] but we have not gotten the results on this yet- will call to9 request    Follow-up  Pertinent negatives include no abdominal pain, fatigue, fever or rash.       .  Patient Active Problem List   Diagnosis    Type 2 diabetes mellitus without complication, without long-term current use of insulin    Essential hypertension    Other male erectile dysfunction    Achilles rupture, right    Essential tremor    Peripheral nerve disease     Umberto has a current medication list which includes the following prescription(s): cetirizine, clotrimazole, diclofenac sodium, gabapentin, basaglar kwikpen u-100 insulin, pen needle, diabetic, ozempic, tadalafil, thin lancets, true metrix glucose meter, true metrix glucose test strip, albuterol, atorvastatin, and lisinopril.    Review of Systems   Constitutional:  Negative for activity change, appetite change, fatigue and fever.   Respiratory:  Negative for shortness of breath.    Gastrointestinal:  Negative for abdominal pain.   Integumentary:  Negative for rash.         Health Maintenance Due   Topic Date Due    HIV Screening  Never done    Pneumococcal Vaccines (Age 50+) (2 of 2 - PPSV23) 12/09/2019    Shingles Vaccine (1 of 2) Never done    Eye Exam  12/14/2022    COVID-19 Vaccine (4 -  2024-25 season) 09/01/2024    Foot Exam  02/15/2025      Health Maintenance reviewed   Objective:      Physical Exam  Vitals and nursing note reviewed.   Constitutional:       General: He is not in acute distress.     Appearance: He is not ill-appearing.   Cardiovascular:      Rate and Rhythm: Normal rate and regular rhythm.      Heart sounds: No murmur heard.  Pulmonary:      Effort: Pulmonary effort is normal.      Breath sounds: Normal breath sounds. No wheezing.   Skin:     General: Skin is warm and dry.      Findings: No rash.   Neurological:      Mental Status: He is alert.   Psychiatric:         Mood and Affect: Mood normal.         Behavior: Behavior normal.         Assessment:       1. Type 2 diabetes mellitus without complication, without long-term current use of insulin    2. Essential hypertension    3. Dyspnea, unspecified type        Plan:       1. Type 2 diabetes mellitus without complication, without long-term current use of insulin  -     atorvastatin (LIPITOR) 40 MG tablet; Take 1 tablet (40 mg total) by mouth once daily.  Dispense: 90 tablet; Refill: 3    2. Essential hypertension  -     lisinopriL 10 MG tablet; Take 1 tablet (10 mg total) by mouth once daily.  Dispense: 90 tablet; Refill: 3    3. Dyspnea, unspecified type  -     albuterol (PROVENTIL/VENTOLIN HFA) 90 mcg/actuation inhaler; Inhale 1-2 puffs into the lungs every 6 (six) hours as needed for Wheezing. Rescue  Dispense: 18 g; Refill: 1

## 2025-01-16 ENCOUNTER — PATIENT MESSAGE (OUTPATIENT)
Dept: ADMINISTRATIVE | Facility: HOSPITAL | Age: 55
End: 2025-01-16
Payer: COMMERCIAL

## 2025-02-13 ENCOUNTER — OFFICE VISIT (OUTPATIENT)
Dept: PODIATRY | Facility: CLINIC | Age: 55
End: 2025-02-13
Payer: COMMERCIAL

## 2025-02-13 VITALS
SYSTOLIC BLOOD PRESSURE: 105 MMHG | WEIGHT: 221.31 LBS | HEIGHT: 70 IN | HEART RATE: 81 BPM | DIASTOLIC BLOOD PRESSURE: 72 MMHG | BODY MASS INDEX: 31.68 KG/M2

## 2025-02-13 DIAGNOSIS — G57.93 NEUROPATHIC PAIN OF BOTH FEET: ICD-10-CM

## 2025-02-13 DIAGNOSIS — E11.9 COMPREHENSIVE DIABETIC FOOT EXAMINATION, TYPE 2 DM, ENCOUNTER FOR: Primary | ICD-10-CM

## 2025-02-13 DIAGNOSIS — E11.65 TYPE 2 DIABETES MELLITUS WITH HYPERGLYCEMIA, UNSPECIFIED WHETHER LONG TERM INSULIN USE: ICD-10-CM

## 2025-02-13 DIAGNOSIS — E11.42 TYPE 2 DIABETES MELLITUS WITH PERIPHERAL NEUROPATHY: ICD-10-CM

## 2025-02-13 PROCEDURE — 3078F DIAST BP <80 MM HG: CPT | Mod: CPTII,S$GLB,, | Performed by: PODIATRIST

## 2025-02-13 PROCEDURE — 99999 PR PBB SHADOW E&M-EST. PATIENT-LVL IV: CPT | Mod: PBBFAC,,, | Performed by: PODIATRIST

## 2025-02-13 PROCEDURE — 99214 OFFICE O/P EST MOD 30 MIN: CPT | Mod: S$GLB,,, | Performed by: PODIATRIST

## 2025-02-13 PROCEDURE — 3074F SYST BP LT 130 MM HG: CPT | Mod: CPTII,S$GLB,, | Performed by: PODIATRIST

## 2025-02-13 PROCEDURE — 1160F RVW MEDS BY RX/DR IN RCRD: CPT | Mod: CPTII,S$GLB,, | Performed by: PODIATRIST

## 2025-02-13 PROCEDURE — 4010F ACE/ARB THERAPY RXD/TAKEN: CPT | Mod: CPTII,S$GLB,, | Performed by: PODIATRIST

## 2025-02-13 PROCEDURE — 3008F BODY MASS INDEX DOCD: CPT | Mod: CPTII,S$GLB,, | Performed by: PODIATRIST

## 2025-02-13 PROCEDURE — 1159F MED LIST DOCD IN RCRD: CPT | Mod: CPTII,S$GLB,, | Performed by: PODIATRIST

## 2025-02-16 NOTE — PROGRESS NOTES
Subjective:       Patient ID: Umberto Okeefe is a 54 y.o. male.    Chief Complaint: Diabetic Foot Exam  Patient presents for annual diabetic foot exam, type 2 diabetes with neuropathic pain in both feet, medication refills.    Patient relates he has actually been able to decrease his gabapentin, and most days he takes 1 a day.  Does like to have option take it 2 or 3 times as needed.  Feels as neuropathy is doing very well  Relates diabetes has done very well.  For the last 2 years A1c has been 7.1, 7.2.  Relates last A1c 7.2 and glucose average is about 140  Feels he has had his diabetes well-controlled over the last year  No history of foot sores or infections  Relates of pain level 2/10  He has a history of the Achilles tendon rupture repair right side and relates full range of motion with no pain  Works full time, on his feet quite a bit and has done very well.  Reports no significant changes to health over the last year      Past Medical History:   Diagnosis Date    Diabetes mellitus, type 2     GERD (gastroesophageal reflux disease)     Hypertension     Neuropathy     Restless leg syndrome      Past Surgical History:   Procedure Laterality Date    KNEE SURGERY      REPAIR OF ACHILLES TENDON Right 6/9/2022    Procedure: REPAIR, TENDON, ACHILLES;  Surgeon: Andrew Lawson DO;  Location: [facility] OR;  Service: Orthopedics;  Laterality: Right;         Current Outpatient Medications   Medication Sig Dispense Refill    albuterol (PROVENTIL/VENTOLIN HFA) 90 mcg/actuation inhaler Inhale 1-2 puffs into the lungs every 6 (six) hours as needed for Wheezing. Rescue 18 g 1    atorvastatin (LIPITOR) 40 MG tablet Take 1 tablet (40 mg total) by mouth once daily. 90 tablet 3    clotrimazole (LOTRIMIN) 1 % Soln Apply topically 2 (two) times daily. 30 Bottle 1    diclofenac sodium (VOLTAREN) 1 % Gel APPLY 2 GRAMS TO THE AFFECTED AREA 3 TIMES DAILY 100 g 0    gabapentin (NEURONTIN) 800 MG tablet Take 1 tablet (800 mg total) by  "mouth 3 (three) times daily. (Patient taking differently: Take 800 mg by mouth 3 (three) times daily. Taking once a day) 90 tablet 6    insulin (BASAGLAR KWIKPEN U-100 INSULIN) glargine 100 units/mL SubQ pen Inject 50 Units into the skin every evening. 45 mL 3    lisinopriL 10 MG tablet Take 1 tablet (10 mg total) by mouth once daily. 90 tablet 3    pen needle, diabetic 31 gauge x 5/16" Ndle Use as directed BID and PRN. Dx: E11.9 100 each 11    semaglutide (OZEMPIC) 2 mg/dose (8 mg/3 mL) PnIj Inject 2 mg into the skin every 7 days. 3 mL 11    THIN LANCETS 26 gauge Misc   5    TRUE METRIX GLUCOSE METER Misc   0    TRUE METRIX GLUCOSE TEST STRIP Strp Use as directed to check blood glucose twice daily and PRN. Dx E11.9. 100 strip 11    cetirizine (ZYRTEC) 10 MG tablet Take 1 tablet (10 mg total) by mouth once daily. 30 tablet 0    tadalafiL (CIALIS) 10 MG tablet Take 1 tablet (10 mg total) by mouth daily as needed for Erectile Dysfunction. 30 tablet 0     No current facility-administered medications for this visit.     Review of patient's allergies indicates:   Allergen Reactions    Topamax [topiramate]      Mental agitation     Montelukast Nausea And Vomiting       Review of Systems   Musculoskeletal:  Negative for gait problem.   All other systems reviewed and are negative.          Objective:      Vitals:    02/13/25 1504   BP: 105/72   BP Location: Left arm   Patient Position: Sitting   Pulse: 81   Weight: 100.4 kg (221 lb 4.8 oz)   Height: 5' 10" (1.778 m)     Physical Exam  Vitals and nursing note reviewed.   Constitutional:       General: He is not in acute distress.     Appearance: Normal appearance. He is well-developed.   Cardiovascular:      Pulses:           Dorsalis pedis pulses are 2+ on the right side and 2+ on the left side.        Posterior tibial pulses are 2+ on the right side and 2+ on the left side.   Musculoskeletal:         General: No swelling or tenderness. Normal range of motion.      Right " foot: No deformity.      Left foot: No deformity.      Comments: Normal range of motion ankle joint bilateral, muscle strength thin tact all planes right   Feet:      Right foot:      Protective Sensation: 5 sites tested.  5 sites sensed.      Skin integrity: Skin integrity normal.      Left foot:      Protective Sensation: 5 sites tested.  5 sites sensed.      Skin integrity: Skin integrity normal.   Skin:     Capillary Refill: Capillary refill takes 2 to 3 seconds.   Neurological:      General: No focal deficit present.      Mental Status: He is alert.      Comments: Detected monofilament all areas bilateral feet.  Painful paresthesias feet, diabetic neuropathy   Psychiatric:         Behavior: Behavior normal.         Thought Content: Thought content normal.     Contains abnormal data Hemoglobin A1c         Component  Ref Range & Units (hover) 1 mo ago  (12/30/24) 8 mo ago  (6/19/24) 11 mo ago  (3/14/24) 1 yr ago  (12/19/23)   Hemoglobin A1C 7.0 High  6.8 High  8.2 High   7.1 High        Estimated Avg Glucose 154 High  148 High  189 High                       Assessment:       1. Comprehensive diabetic foot examination, type 2 DM, encounter for    2. Type 2 diabetes mellitus with peripheral neuropathy    3. Type 2 diabetes mellitus with hyperglycemia, unspecified whether long term insulin use            Plan:         CONTINUE GABAPENTIN 800 MG 1-3 TIMES DAILY P.R.N. NEUROPATHY PAIN FEET      Comprehensive diabetic pedal exam performed  Reviewed results monofilament testing, continues to have good sensation in feet  Reviewed neuropathy at length today with patient.  Explained neuropathy pain can definitely fluctuate.  I would do feel it is good he decrease this medication and consistently at least take 1 daily to keep it under control, 2-3 as needed.  Advised patient it is very common to takes the neuropathy medication at night to calm down nerves in the feet secondary to neuropathy  Advised patient if he continues  to keep his diabetes controlled and especially if he can get his A1c just under 6 it is likely he will have less neuropathy pain in his feet  Reviewed A1c  Reviewed benefit of better control glucose/diabetes regarding potential foot complications especially neuropathy, advised patient there is typically a direct correlation to control diabetes helping with neuropathic pain  Reviewed gabapentin, dosage and frequency  Reviewed appropriate shoes, recommended tennis shoes indoors and out.  Important to have appropriate shoes especially indoors to protect feet, avoid flat shoes, slippers or walking in sock or bare feet.    Discussed maintenance of skin and nails and potential complications.   Reviewed need for daily foot checks and instructed patient to contact the office with any area of concern which has not improved in a few days.  Patient was in understanding and agreement with treatment plan.  I counseled the patient on their conditions, implications and medical management.  Instructed patient to contact the office with any changes, questions, concerns, worsening of symptoms.   Total face to face time 30 minutes, exam, assessment, treatment, discussion, additional time for review of chart prior to and following appointment and visit documentation, consultation and coordination of care.    Follow up 12 months for annual diabetic foot exam    This note was created using M*Modal voice recognition software that occasionally misinterpreted phrases or words.

## 2025-03-03 DIAGNOSIS — E11.9 TYPE 2 DIABETES MELLITUS WITHOUT COMPLICATION, WITHOUT LONG-TERM CURRENT USE OF INSULIN: ICD-10-CM

## 2025-03-03 RX ORDER — INSULIN GLARGINE 100 [IU]/ML
50 INJECTION, SOLUTION SUBCUTANEOUS NIGHTLY
Qty: 45 ML | Refills: 1 | Status: SHIPPED | OUTPATIENT
Start: 2025-03-03

## 2025-03-03 NOTE — TELEPHONE ENCOUNTER
No care due was identified.  [facility] Embedded Care Due Messages. Reference number: [id number].   3/03/2025 5:32:43 AM CST

## 2025-03-03 NOTE — TELEPHONE ENCOUNTER
Refill Decision Note   Umberto Okeefe  is requesting a refill authorization.  Brief Assessment and Rationale for Refill:  Approve     Medication Therapy Plan:         Comments:     Note composed:12:50 PM 03/03/2025

## 2025-03-13 DIAGNOSIS — E11.9 TYPE 2 DIABETES MELLITUS WITHOUT COMPLICATION, WITHOUT LONG-TERM CURRENT USE OF INSULIN: ICD-10-CM

## 2025-03-13 RX ORDER — SEMAGLUTIDE 2.68 MG/ML
INJECTION, SOLUTION SUBCUTANEOUS
Qty: 9 ML | Refills: 1 | Status: SHIPPED | OUTPATIENT
Start: 2025-03-13

## 2025-03-13 NOTE — TELEPHONE ENCOUNTER
No care due was identified.  [facility] Embedded Care Due Messages. Reference number: [id number].   3/13/2025 5:32:01 AM CDT

## 2025-03-13 NOTE — TELEPHONE ENCOUNTER
Refill Authorization Note     Refill Decision Note   Umberto Okeefe  is requesting a refill authorization.  Brief Assessment and Rationale for Refill:  Approve     Medication Therapy Plan:       Medication Reconciliation Completed: No   Comments:     No Care Gaps recommended.     Note composed:9:22 AM 03/13/2025

## 2025-04-07 ENCOUNTER — RESULTS FOLLOW-UP (OUTPATIENT)
Dept: FAMILY MEDICINE | Facility: CLINIC | Age: 55
End: 2025-04-07

## 2025-04-07 ENCOUNTER — LAB VISIT (OUTPATIENT)
Dept: LAB | Facility: HOSPITAL | Age: 55
End: 2025-04-07
Attending: FAMILY MEDICINE
Payer: COMMERCIAL

## 2025-04-07 DIAGNOSIS — E11.9 TYPE 2 DIABETES MELLITUS WITHOUT COMPLICATION, WITHOUT LONG-TERM CURRENT USE OF INSULIN: ICD-10-CM

## 2025-04-07 LAB
EAG (OHS): 160 MG/DL (ref 68–131)
HBA1C MFR BLD: 7.2 % (ref 4–5.6)

## 2025-04-07 PROCEDURE — 36415 COLL VENOUS BLD VENIPUNCTURE: CPT

## 2025-04-07 PROCEDURE — 83036 HEMOGLOBIN GLYCOSYLATED A1C: CPT

## 2025-04-09 ENCOUNTER — OFFICE VISIT (OUTPATIENT)
Dept: FAMILY MEDICINE | Facility: CLINIC | Age: 55
End: 2025-04-09
Payer: COMMERCIAL

## 2025-04-09 VITALS
OXYGEN SATURATION: 100 % | HEIGHT: 70 IN | BODY MASS INDEX: 31.92 KG/M2 | SYSTOLIC BLOOD PRESSURE: 114 MMHG | WEIGHT: 223 LBS | DIASTOLIC BLOOD PRESSURE: 72 MMHG | HEART RATE: 97 BPM

## 2025-04-09 DIAGNOSIS — Z11.4 SCREENING FOR HIV (HUMAN IMMUNODEFICIENCY VIRUS): Primary | ICD-10-CM

## 2025-04-09 DIAGNOSIS — Z79.4 DIABETES MELLITUS TYPE 2, INSULIN DEPENDENT: ICD-10-CM

## 2025-04-09 DIAGNOSIS — G62.9 NEUROPATHY: ICD-10-CM

## 2025-04-09 DIAGNOSIS — E11.9 DIABETES MELLITUS TYPE 2, INSULIN DEPENDENT: ICD-10-CM

## 2025-04-09 DIAGNOSIS — I10 PRIMARY HYPERTENSION: ICD-10-CM

## 2025-04-09 PROCEDURE — 99999 PR PBB SHADOW E&M-EST. PATIENT-LVL IV: CPT | Mod: PBBFAC,,, | Performed by: FAMILY MEDICINE

## 2025-04-10 RX ORDER — GABAPENTIN 800 MG/1
800 TABLET ORAL DAILY
Qty: 90 TABLET | Refills: 1 | Status: SHIPPED | OUTPATIENT
Start: 2025-04-10

## 2025-04-10 NOTE — PROGRESS NOTES
[facility]      Subjective    Subjective     Patient ID:   Ailyn 55-year-old  male with known type 2 diabetes mellitus requiring insulin, hyperlipidemia, peripheral neuropathy, hypertension, and obesity.  The patient takes medications as prescribed.  Inquires about CGM glucose monitoring.  Mood stable.  No specific complaints but would like a new PCP        Chief Complaint:   Chief Complaint   Patient presents with    [facility]        Past Medical History:   Diagnosis Date    Diabetes mellitus, type 2     GERD (gastroesophageal reflux disease)     Hypertension     Neuropathy     Restless leg syndrome         Past Surgical History:   Procedure Laterality Date    KNEE SURGERY      REPAIR OF ACHILLES TENDON Right 6/9/2022    Procedure: REPAIR, TENDON, ACHILLES;  Surgeon: Andrew Lawson DO;  Location: [facility] OR;  Service: Orthopedics;  Laterality: Right;        Review of patient's allergies indicates:   Allergen Reactions    Topamax [topiramate]      Mental agitation     Montelukast Nausea And Vomiting             Review of Systems   Constitutional: Negative.    HENT: Negative.     Eyes: Negative.    Respiratory: Negative.     Cardiovascular: Negative.    Gastrointestinal: Negative.    Musculoskeletal: Negative.    Skin: Negative.    Neurological: Negative.    Endo/Heme/Allergies: Negative.    Psychiatric/Behavioral: Negative.                   Objective    Objective     Vitals:    04/09/25 1608   BP: 114/72   BP Location: Left arm   Patient Position: Sitting   Pulse: 97   SpO2: 100%   Weight: 101.2 kg (223 lb)   Height: 5' 10" (1.778 m)          Physical Exam  Vitals reviewed.   Constitutional:       Appearance: Normal appearance.   HENT:      Head: Normocephalic and atraumatic.      Right Ear: Tympanic membrane and ear canal normal.      Left Ear: Tympanic membrane and ear canal normal.      Nose: Nose normal.      Mouth/Throat:      Mouth: Mucous membranes are moist. " "  Eyes:      Extraocular Movements: Extraocular movements intact.      Conjunctiva/sclera: Conjunctivae normal.   Cardiovascular:      Rate and Rhythm: Normal rate and regular rhythm.   Pulmonary:      Effort: Pulmonary effort is normal.      Breath sounds: Normal breath sounds.   Abdominal:      General: Abdomen is flat.   Musculoskeletal:         General: Normal range of motion.      Cervical back: Normal range of motion and neck supple.   Skin:     General: Skin is warm and dry.   Neurological:      General: No focal deficit present.      Mental Status: He is alert.             Medication List with Changes/Refills   Current Medications    ALBUTEROL (PROVENTIL/VENTOLIN HFA) 90 MCG/ACTUATION INHALER    Inhale 1-2 puffs into the lungs every 6 (six) hours as needed for Wheezing. Rescue    ATORVASTATIN (LIPITOR) 40 MG TABLET    Take 1 tablet (40 mg total) by mouth once daily.    BASAGLAR KWIKPEN U-100 INSULIN 100 UNIT/ML (3 ML) INPN PEN    INJECT 50 UNITS SUBCUTANEOUSLY IN THE EVENING    CETIRIZINE (ZYRTEC) 10 MG TABLET    Take 1 tablet (10 mg total) by mouth once daily.    CLOTRIMAZOLE (LOTRIMIN) 1 % SOLN    Apply topically 2 (two) times daily.    DICLOFENAC SODIUM (VOLTAREN) 1 % GEL    APPLY 2 GRAMS TO THE AFFECTED AREA 3 TIMES DAILY    LISINOPRIL 10 MG TABLET    Take 1 tablet (10 mg total) by mouth once daily.    OZEMPIC 2 MG/DOSE (8 MG/3 ML) PNIJ    INJECT 2 MG INTO THE SKIN EVERY 7 DAYS **DOSE INCEASE**    PEN NEEDLE, DIABETIC 31 GAUGE X 5/16" NDLE    Use as directed BID and PRN. Dx: E11.9    TADALAFIL (CIALIS) 10 MG TABLET    Take 1 tablet (10 mg total) by mouth daily as needed for Erectile Dysfunction.    THIN LANCETS 26 GAUGE MISC        TRUE METRIX GLUCOSE METER MISC        TRUE METRIX GLUCOSE TEST STRIP STRP    Use as directed to check blood glucose twice daily and PRN. Dx E11.9.   Changed and/or Refilled Medications    Modified Medication Previous Medication    GABAPENTIN (NEURONTIN) 800 MG TABLET " gabapentin (NEURONTIN) 800 MG tablet       Take 1 tablet (800 mg total) by mouth once daily. Taking once a day    Take 1 tablet (800 mg total) by mouth 3 (three) times daily.           Assessment & Plan     Assessment & Plan  Diabetes mellitus type 2, insulin dependent  Continue current medications.  Encourage patient to bring glucometer to next office visit for review.  Consider CGM monitoring.  Target A1c less than 7.0%.  Recommended annual dilated eye exam, annual foot exam.  Recommend annual urine microalbuminuria screening.  Orders:    Hemoglobin A1C; Future    Screening for HIV (human immunodeficiency virus)  Discussed recommendations, ordered prior to next visit    Orders:    HIV 1/2 Ag/Ab (4th Gen); Future    Primary hypertension  Target blood pressure less than 130/80 mmHg.  Dash diet reviewed.  Encouraged weight loss through portion control, calorie reduction and increasing regular cardiopulmonary exercise.         Neuropathy  Stable, has actually reduced gabapentin now taking 800 mg daily.  Plan: Aggressive control of blood sugars.  Eating a well-balanced diet, consider Mediterranean diet.  Regular physical activity.    Orders:    gabapentin (NEURONTIN) 800 MG tablet; Take 1 tablet (800 mg total) by mouth once daily. Taking once a day        Follow up next visit with blood work prior to appointment.  Discussed CGM.    Christopher L Jones, MD [facility] 2nd Floor Family Medicine  [address] [place] [address] [phone]

## 2025-05-13 ENCOUNTER — HOSPITAL ENCOUNTER (EMERGENCY)
Facility: HOSPITAL | Age: 55
Discharge: HOME | End: 2025-05-13
Attending: STUDENT IN AN ORGANIZED HEALTH CARE EDUCATION/TRAINING PROGRAM
Payer: COMMERCIAL

## 2025-05-13 VITALS
HEART RATE: 91 BPM | RESPIRATION RATE: 15 BRPM | DIASTOLIC BLOOD PRESSURE: 75 MMHG | SYSTOLIC BLOOD PRESSURE: 136 MMHG | WEIGHT: 217 LBS | OXYGEN SATURATION: 97 % | HEIGHT: 70 IN | TEMPERATURE: 98 F | BODY MASS INDEX: 31.07 KG/M2

## 2025-05-13 DIAGNOSIS — M17.12 OSTEOARTHRITIS OF LEFT KNEE, UNSPECIFIED OSTEOARTHRITIS TYPE: Primary | ICD-10-CM

## 2025-05-13 DIAGNOSIS — M25.562 PAIN AND SWELLING OF LEFT KNEE: ICD-10-CM

## 2025-05-13 DIAGNOSIS — M25.462 PAIN AND SWELLING OF LEFT KNEE: ICD-10-CM

## 2025-05-13 DIAGNOSIS — M25.462 EFFUSION OF LEFT KNEE: ICD-10-CM

## 2025-05-13 PROCEDURE — 96372 THER/PROPH/DIAG INJ SC/IM: CPT | Performed by: NURSE PRACTITIONER

## 2025-05-13 PROCEDURE — 99284 EMERGENCY DEPT VISIT MOD MDM: CPT | Mod: 25

## 2025-05-13 PROCEDURE — 73562 X-RAY EXAM OF KNEE 3: CPT | Mod: 26,LT,, | Performed by: RADIOLOGY

## 2025-05-13 PROCEDURE — 73562 X-RAY EXAM OF KNEE 3: CPT | Mod: TC,LT

## 2025-05-13 PROCEDURE — 63600175 PHARM REV CODE 636 W HCPCS: Mod: JZ,TB | Performed by: NURSE PRACTITIONER

## 2025-05-13 RX ORDER — KETOROLAC TROMETHAMINE 30 MG/ML
60 INJECTION, SOLUTION INTRAMUSCULAR; INTRAVENOUS
Status: COMPLETED | OUTPATIENT
Start: 2025-05-13 | End: 2025-05-13

## 2025-05-13 RX ORDER — DICLOFENAC SODIUM 75 MG/1
75 TABLET, DELAYED RELEASE ORAL
Qty: 20 TABLET | Refills: 0 | Status: SHIPPED | OUTPATIENT
Start: 2025-05-13 | End: 2025-05-23

## 2025-05-13 RX ADMIN — KETOROLAC TROMETHAMINE 60 MG: 30 INJECTION, SOLUTION INTRAMUSCULAR at 04:05

## 2025-05-13 NOTE — ED PROVIDER NOTES
CHIEF COMPLAINT  Chief Complaint   Patient presents with    Knee Pain     Left knee pain ongoing for approx 2 months. Denies injury/trauma. Pt with limp gait to triage. Mild swelling to left knee.        HISTORY OF PRESENT ILLNESS  Umberto Okeefe is a 55 y.o. male with PMH as below who presents to ER for evaluation of left knee pain. He states he had the pain in his knee for the past 2 months, ok to go downstairs, pain got intense when he goes upstairs. He noticed swelling and pain today. 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 He is ambulatory to ER, no deformity or warmness on his knee.   No other specific aggravating or relieving factors otherwise.      PAST MEDICAL HISTORY  Past Medical History:   Diagnosis Date    Diabetes mellitus, type 2     GERD (gastroesophageal reflux disease)     Hypertension     Neuropathy     Restless leg syndrome        CURRENT MEDICATIONS    Current Medications[1]    ALLERGIES    Review of patient's allergies indicates:   Allergen Reactions    Topamax [topiramate]      Mental agitation     Montelukast Nausea And Vomiting       SURGICAL HISTORY    Past Surgical History:   Procedure Laterality Date    KNEE SURGERY      REPAIR OF ACHILLES TENDON Right 2022    Procedure: REPAIR, TENDON, ACHILLES;  Surgeon: Andrew Lawson DO;  Location: [facility] OR;  Service: Orthopedics;  Laterality: Right;       SOCIAL HISTORY    Social History     Socioeconomic History    Marital status:    Tobacco Use    Smoking status: Former     Current packs/day: 0.00     Average packs/day: 1 pack/day for 15.0 years (15.0 ttl pk-yrs)     Types: Cigarettes     Start date: 1995     Quit date: 2010     Years since quittin.9    Smokeless tobacco: Never   Substance and Sexual Activity    Alcohol use: No    Drug use: No    Sexual activity: Not Currently     Partners: Female     Social Drivers of Health  "    Financial Resource Strain: Low Risk  (3/9/2024)    Overall Financial Resource Strain (CARDIA)     Difficulty of Paying Living Expenses: Not very hard   Food Insecurity: No Food Insecurity (3/9/2024)    Hunger Vital Sign     Worried About Running Out of Food in the Last Year: Never true     Ran Out of Food in the Last Year: Never true   Transportation Needs: No Transportation Needs (3/9/2024)    PRAPARE - Transportation     Lack of Transportation (Medical): No     Lack of Transportation (Non-Medical): No   Physical Activity: Sufficiently Active (3/9/2024)    Exercise Vital Sign     Days of Exercise per Week: 5 days     Minutes of Exercise per Session: 40 min   Stress: Stress Concern Present (3/9/2024)    [employer] - Occupational Stress Questionnaire     Feeling of Stress : To some extent   Housing Stability: High Risk (3/9/2024)    Housing Stability Vital Sign     Unable to Pay for Housing in the Last Year: No     Number of Places Lived in the Last Year: 3     Unstable Housing in the Last Year: No       FAMILY HISTORY    Family History   Problem Relation Name Age of Onset    Kidney disease Mother Brandi Okeefe     Arthritis Mother Brandi Okeefe     Hearing loss Mother Brandi Okeefe     Kidney disease Maternal Uncle         REVIEW OF SYSTEMS    Review of Systems   Musculoskeletal:  Positive for joint pain.     All other systems reviewed and are negative    VITAL SIGNS:   /75 (BP Location: Left arm)   Pulse 91   Temp 98 °F (36.7 °C) (Oral)   Resp 15   Ht 5' 10" (1.778 m)   Wt 98.4 kg (217 lb)   SpO2 97%   BMI 31.14 kg/m²      Physical Exam  Constitutional:       Appearance: Normal appearance.   HENT:      Head: Normocephalic.   Cardiovascular:      Rate and Rhythm: Normal rate.      Pulses:           Dorsalis pedis pulses are 2+ on the right side and 2+ on the left side.   Pulmonary:      Effort: Pulmonary effort is normal. No respiratory distress. "   Musculoskeletal:         General: Normal range of motion.      Right knee: Normal.      Left knee: Tenderness present over the medial joint line and patellar tendon.   Skin:     General: Skin is warm.      Capillary Refill: Capillary refill takes less than 2 seconds.   Neurological:      General: No focal deficit present.      Mental Status: He is alert.      GCS: GCS eye subscore is 4. GCS verbal subscore is 5. GCS motor subscore is 6.   Psychiatric:         Attention and Perception: Attention normal.         Mood and Affect: Mood normal.         Speech: Speech normal.       Vitals and nursing note reviewed.     LABS    Labs Reviewed - No data to display      EKG      RADIOLOGY    X-Ray Knee 3 View Left   Final Result            PROCEDURES    Procedures    Medications   ketorolac injection 60 mg (60 mg Intramuscular Given 5/13/25 8136)                Medical Decision Making  Umberto Okeefe is a 55 y.o. male with PMH as below who presents to ER for evaluation of left knee pain. He states he had the pain in his knee for the past 2 months, ok to go downstairs, pain got intense when he goes upstairs. He noticed swelling and pain today. 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 He is ambulatory to ER, no deformity or warmness on his knee.   No other specific aggravating or relieving factors otherwise.     On examination he has good good range of the knee with mild point tenderness to the medial joint line. There is no warmth redness or induration. Capillary refill is normal and there is no swelling of the foot or the leg and no calf tenderness. I told the patient that I suspect he has some medial joint or medial ligament problem. An MRI would be reasonable but at this point it is not an emergency. The patient does not have fever redness swelling and warmth to the knee. She does not appear to have a septic knee. It sounds as if  this is something that could be best cared for with an outpatient orthopedic visit.  Referral to orthopedist    Final Impression: Osteoarthritis left knee   Differential Diagnosis: Torn meniscus, knee effusion      Problems Addressed:  Effusion of left knee: acute illness or injury  Osteoarthritis of left knee, unspecified osteoarthritis type: chronic illness or injury  Pain and swelling of left knee: acute illness or injury    Amount and/or Complexity of Data Reviewed  Radiology: ordered. Decision-making details documented in ED Course.    Risk  Prescription drug management.           Discharge Medication List as of 5/13/2025  4:45 PM          Discharge Medication List as of 5/13/2025  4:45 PM        START taking these medications    Details   diclofenac (VOLTAREN) 75 MG EC tablet Take 1 tablet (75 mg total) by mouth 2 times daily 2 hours after meal. for 10 days, Starting Tue 5/13/2025, Until Fri 5/23/2025, Normal             I discussed with patient and/or family/caretaker that evaluation in the ED does not suggest any emergent or life threatening medical condition requiring immediate intervention beyond what was provided in the ED, and I believe the patient is safe for discharge.  Regardless, an unremarkable evaluation in the ED does not preclude the development or presence of a serious or life threatening condition. As such, patient was instructed to return immediately for any worsening or change in current symptoms  Patient agrees with plan of care.    DISPOSITION  Patient discharged to home in stable condition.        FINAL IMPRESSION    1. Osteoarthritis of left knee, unspecified osteoarthritis type    2. Pain and swelling of left knee    3. Effusion of left knee           [1] No current facility-administered medications for this encounter.    Current Outpatient Medications:     albuterol (PROVENTIL/VENTOLIN HFA) 90 mcg/actuation inhaler, Inhale 1-2 puffs into the lungs every 6 (six) hours as needed for  "Wheezing. Rescue, Disp: 18 g, Rfl: 1    atorvastatin (LIPITOR) 40 MG tablet, Take 1 tablet (40 mg total) by mouth once daily., Disp: 90 tablet, Rfl: 3    BASAGLAR KWIKPEN U-100 INSULIN 100 unit/mL (3 mL) InPn pen, INJECT 50 UNITS SUBCUTANEOUSLY IN THE EVENING, Disp: 45 mL, Rfl: 1    cetirizine (ZYRTEC) 10 MG tablet, Take 1 tablet (10 mg total) by mouth once daily., Disp: 30 tablet, Rfl: 0    clotrimazole (LOTRIMIN) 1 % Soln, Apply topically 2 (two) times daily., Disp: 30 Bottle, Rfl: 1    diclofenac (VOLTAREN) 75 MG EC tablet, Take 1 tablet (75 mg total) by mouth 2 times daily 2 hours after meal. for 10 days, Disp: 20 tablet, Rfl: 0    diclofenac sodium (VOLTAREN) 1 % Gel, APPLY 2 GRAMS TO THE AFFECTED AREA 3 TIMES DAILY, Disp: 100 g, Rfl: 0    gabapentin (NEURONTIN) 800 MG tablet, Take 1 tablet (800 mg total) by mouth once daily. Taking once a day, Disp: 90 tablet, Rfl: 1    lisinopriL 10 MG tablet, Take 1 tablet (10 mg total) by mouth once daily., Disp: 90 tablet, Rfl: 3    OZEMPIC 2 mg/dose (8 mg/3 mL) PnIj, INJECT 2 MG INTO THE SKIN EVERY 7 DAYS **DOSE INCEASE**, Disp: 9 mL, Rfl: 1    pen needle, diabetic 31 gauge x 5/16" Ndle, Use as directed BID and PRN. Dx: E11.9, Disp: 100 each, Rfl: 11    tadalafiL (CIALIS) 10 MG tablet, Take 1 tablet (10 mg total) by mouth daily as needed for Erectile Dysfunction., Disp: 30 tablet, Rfl: 0    THIN LANCETS 26 gauge Misc, , Disp: , Rfl: 5    TRUE METRIX GLUCOSE METER Misc, , Disp: , Rfl: 0    TRUE METRIX GLUCOSE TEST STRIP Strp, Use as directed to check blood glucose twice daily and PRN. Dx E11.9., Disp: 100 strip, Rfl: 11       Anson Lainez, NP  05/13/25 8885    "

## 2025-05-13 NOTE — Clinical Note
"Umberto Cook" Maldonado was seen and treated in our emergency department on 5/13/2025.  He may return to work on 05/15/2025.       If you have any questions or concerns, please don't hesitate to call.      Anson Lainez, NP" Addended by: YONATHAN BELTRAN on: 7/27/2023 11:27 AM     Modules accepted: Orders

## 2025-05-13 NOTE — DISCHARGE INSTRUCTIONS
Rest, ice, compression, elevate your left knee. Return for any worsening or new symptoms. Follow up with orthopedist

## 2025-05-13 NOTE — ED NOTES
55 y.o. male to ED with c.o. non traumatic left knee pain and swelling x 2 months. Patient states it hurts to bear weight on it. Slight swelling to the left knee noted. Patient denies all other medical complaints at this time.

## 2025-05-13 NOTE — Clinical Note
"Umberto Cook" Maldonado was seen and treated in our emergency department on 5/13/2025.  He may return to work on 05/15/2025.       If you have any questions or concerns, please don't hesitate to call.      Anson Lainez, NP"

## 2025-05-19 ENCOUNTER — OFFICE VISIT (OUTPATIENT)
Dept: ORTHOPEDICS | Facility: CLINIC | Age: 55
End: 2025-05-19
Payer: COMMERCIAL

## 2025-05-19 VITALS — HEIGHT: 70 IN | BODY MASS INDEX: 31.06 KG/M2 | WEIGHT: 216.94 LBS

## 2025-05-19 DIAGNOSIS — M25.462 EFFUSION OF LEFT KNEE: ICD-10-CM

## 2025-05-19 DIAGNOSIS — M17.12 OSTEOARTHRITIS OF LEFT KNEE, UNSPECIFIED OSTEOARTHRITIS TYPE: ICD-10-CM

## 2025-05-19 PROCEDURE — 3008F BODY MASS INDEX DOCD: CPT | Mod: CPTII,S$GLB,, | Performed by: ORTHOPAEDIC SURGERY

## 2025-05-19 PROCEDURE — 20610 DRAIN/INJ JOINT/BURSA W/O US: CPT | Mod: LT,S$GLB,, | Performed by: ORTHOPAEDIC SURGERY

## 2025-05-19 PROCEDURE — 1159F MED LIST DOCD IN RCRD: CPT | Mod: CPTII,S$GLB,, | Performed by: ORTHOPAEDIC SURGERY

## 2025-05-19 PROCEDURE — 3051F HG A1C>EQUAL 7.0%<8.0%: CPT | Mod: CPTII,S$GLB,, | Performed by: ORTHOPAEDIC SURGERY

## 2025-05-19 PROCEDURE — 99999 PR PBB SHADOW E&M-EST. PATIENT-LVL IV: CPT | Mod: PBBFAC,,, | Performed by: ORTHOPAEDIC SURGERY

## 2025-05-19 PROCEDURE — 99214 OFFICE O/P EST MOD 30 MIN: CPT | Mod: 25,S$GLB,, | Performed by: ORTHOPAEDIC SURGERY

## 2025-05-19 PROCEDURE — 4010F ACE/ARB THERAPY RXD/TAKEN: CPT | Mod: CPTII,S$GLB,, | Performed by: ORTHOPAEDIC SURGERY

## 2025-05-19 PROCEDURE — 1160F RVW MEDS BY RX/DR IN RCRD: CPT | Mod: CPTII,S$GLB,, | Performed by: ORTHOPAEDIC SURGERY

## 2025-05-19 RX ORDER — TRIAMCINOLONE ACETONIDE 40 MG/ML
40 INJECTION, SUSPENSION INTRA-ARTICULAR; INTRAMUSCULAR
Status: DISCONTINUED | OUTPATIENT
Start: 2025-05-19 | End: 2025-05-19 | Stop reason: HOSPADM

## 2025-05-19 RX ADMIN — TRIAMCINOLONE ACETONIDE 40 MG: 40 INJECTION, SUSPENSION INTRA-ARTICULAR; INTRAMUSCULAR at 08:05

## 2025-05-19 NOTE — PROCEDURES
Large Joint Aspiration/Injection: L knee    Date/Time: 5/19/2025 8:15 AM    Performed by: Jama Silva MD  Authorized by: Jama Silva MD    Consent Done?:  Yes (Verbal)  Indications:  Pain  Site marked: the procedure site was marked    Timeout: prior to procedure the correct patient, procedure, and site was verified    Local anesthetic:  Lidocaine 1% without epinephrine and bupivacaine 0.25% without epinephrine  Anesthetic total (ml):  6      Details:  Needle Size:  20 G  Approach:  Anterolateral  Location:  Knee  Site:  L knee  Medications:  40 mg triamcinolone acetonide 40 mg/mL  Patient tolerance:  Patient tolerated the procedure well with no immediate complications

## 2025-05-19 NOTE — PROGRESS NOTES
Subjective:      Patient ID: Umberto Okeefe is a 55 y.o. male.    Chief Complaint: Pain and Swelling of the Left Knee    HPI  55-year-old male several month history of left lower extremity pain.  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  Recently seen in the emergency department with a an effusion in his knee diagnosed with a osteoarthritis and referred for further evaluation.  ROS      Objective:    Ortho Exam     Constitutional:   Patient is alert  and oriented in no acute distress  HEENT:  normocephalic atraumatic; PERRL EOMI  Neck:  Supple without adenopathy  Cardiovascular:  Normal rate and rhythm  Pulmonary:  Normal respiratory effort normal chest wall expansion  Abdominal:  Nonprotuberant nondistended  Musculoskeletal:  Minimally antalgic gait  Diffuse primarily medial joint line tenderness  He has a trace effusion there is no gross instability  Neurological:  No focal defect; cranial nerves 2-12 grossly intact  Psychiatric/behavioral:  Mood and behavior normal      X-Ray Knee 3 View Left  EXAMINATION:  XR KNEE 3 VIEW LEFT    CLINICAL HISTORY:  Pain in left knee    TECHNIQUE:  AP, lateral, and Merchant views of the left knee were performed.    COMPARISON:  None    FINDINGS:  Mild degenerative osteoarthrosis of the medial compartment joint space narrowing and osteophyte formation.  There is a small suprapatellar effusion.  There is mild soft tissue swelling of the anterior knee.    Electronically signed by: Derek Joiner  Date:    05/13/2025  Time:    16:02       My Radiographs Findings:    Radiographs without acute osseous abnormalities she does have some mild degenerative changes  Assessment:       Encounter Diagnoses   Name Primary?    Osteoarthritis of left knee, unspecified osteoarthritis type     Effusion of left knee          Plan:       I have discussed medical condition treatment options with the him at length.  After a verbal consent and sterile prep I injected his left  knee today.  We have discussed generalized activity restrictions ice elevation compressive wrapping and NSAIDs as needed.  Follow up in 4-6 weeks if symptoms fail to improve we would consider an MRI in his nail see him sooner if any questions or problems.        Past Medical History:   Diagnosis Date    Diabetes mellitus, type 2     GERD (gastroesophageal reflux disease)     Hypertension     Neuropathy     Restless leg syndrome      Past Surgical History:   Procedure Laterality Date    KNEE SURGERY      REPAIR OF ACHILLES TENDON Right 6/9/2022    Procedure: REPAIR, TENDON, ACHILLES;  Surgeon: Andrew Lawson DO;  Location: [facility] OR;  Service: Orthopedics;  Laterality: Right;       Current Medications[1]    Review of patient's allergies indicates:   Allergen Reactions    Topamax [topiramate]      Mental agitation     Montelukast Nausea And Vomiting       Family History   Problem Relation Name Age of Onset    Kidney disease Mother Brandi Okeefe     Arthritis Mother Brandi Okeefe     Hearing loss Mother Brandi Okeefe     Kidney disease Maternal Uncle       Social History     Occupational History    Not on file   Tobacco Use    Smoking status: Former     Current packs/day: 0.00     Average packs/day: 1 pack/day for 15.0 years (15.0 ttl pk-yrs)     Types: Cigarettes     Start date: 5/19/1995     Quit date: 5/19/2010     Years since quitting: 15.0    Smokeless tobacco: Never   Substance and Sexual Activity    Alcohol use: No    Drug use: No    Sexual activity: Not Currently     Partners: Female            [1]   Current Outpatient Medications:     albuterol (PROVENTIL/VENTOLIN HFA) 90 mcg/actuation inhaler, Inhale 1-2 puffs into the lungs every 6 (six) hours as needed for Wheezing. Rescue, Disp: 18 g, Rfl: 1    atorvastatin (LIPITOR) 40 MG tablet, Take 1 tablet (40 mg total) by mouth once daily., Disp: 90 tablet, Rfl: 3    BASAGLAR KWIKPEN U-100 INSULIN 100 unit/mL (3 mL) InPn pen, INJECT 50 UNITS  "SUBCUTANEOUSLY IN THE EVENING, Disp: 45 mL, Rfl: 1    clotrimazole (LOTRIMIN) 1 % Soln, Apply topically 2 (two) times daily., Disp: 30 Bottle, Rfl: 1    diclofenac (VOLTAREN) 75 MG EC tablet, Take 1 tablet (75 mg total) by mouth 2 times daily 2 hours after meal. for 10 days, Disp: 20 tablet, Rfl: 0    diclofenac sodium (VOLTAREN) 1 % Gel, APPLY 2 GRAMS TO THE AFFECTED AREA 3 TIMES DAILY, Disp: 100 g, Rfl: 0    gabapentin (NEURONTIN) 800 MG tablet, Take 1 tablet (800 mg total) by mouth once daily. Taking once a day, Disp: 90 tablet, Rfl: 1    lisinopriL 10 MG tablet, Take 1 tablet (10 mg total) by mouth once daily., Disp: 90 tablet, Rfl: 3    OZEMPIC 2 mg/dose (8 mg/3 mL) PnIj, INJECT 2 MG INTO THE SKIN EVERY 7 DAYS **DOSE INCEASE**, Disp: 9 mL, Rfl: 1    pen needle, diabetic 31 gauge x 5/16" Ndle, Use as directed BID and PRN. Dx: E11.9, Disp: 100 each, Rfl: 11    THIN LANCETS 26 gauge Misc, , Disp: , Rfl: 5    TRUE METRIX GLUCOSE METER Misc, , Disp: , Rfl: 0    TRUE METRIX GLUCOSE TEST STRIP Strp, Use as directed to check blood glucose twice daily and PRN. Dx E11.9., Disp: 100 strip, Rfl: 11    cetirizine (ZYRTEC) 10 MG tablet, Take 1 tablet (10 mg total) by mouth once daily., Disp: 30 tablet, Rfl: 0    tadalafiL (CIALIS) 10 MG tablet, Take 1 tablet (10 mg total) by mouth daily as needed for Erectile Dysfunction., Disp: 30 tablet, Rfl: 0    "

## 2025-06-11 ENCOUNTER — TELEPHONE (OUTPATIENT)
Dept: FAMILY MEDICINE | Facility: CLINIC | Age: 55
End: 2025-06-11
Payer: COMMERCIAL

## 2025-06-11 ENCOUNTER — OFFICE VISIT (OUTPATIENT)
Dept: CARDIOLOGY | Facility: CLINIC | Age: 55
End: 2025-06-11
Payer: COMMERCIAL

## 2025-06-11 VITALS
HEIGHT: 70 IN | OXYGEN SATURATION: 96 % | SYSTOLIC BLOOD PRESSURE: 126 MMHG | BODY MASS INDEX: 31.28 KG/M2 | HEART RATE: 82 BPM | WEIGHT: 218.5 LBS | DIASTOLIC BLOOD PRESSURE: 82 MMHG

## 2025-06-11 DIAGNOSIS — E11.42 TYPE 2 DIABETES MELLITUS WITH DIABETIC POLYNEUROPATHY, WITH LONG-TERM CURRENT USE OF INSULIN: ICD-10-CM

## 2025-06-11 DIAGNOSIS — Z91.89 CARDIOVASCULAR EVENT RISK: ICD-10-CM

## 2025-06-11 DIAGNOSIS — I10 ESSENTIAL HYPERTENSION: ICD-10-CM

## 2025-06-11 DIAGNOSIS — R07.2 PRECORDIAL CHEST PAIN: ICD-10-CM

## 2025-06-11 DIAGNOSIS — R79.89 PRERENAL AZOTEMIA: ICD-10-CM

## 2025-06-11 DIAGNOSIS — R29.898 LEG WEAKNESS, BILATERAL: ICD-10-CM

## 2025-06-11 DIAGNOSIS — Z79.4 TYPE 2 DIABETES MELLITUS WITH DIABETIC POLYNEUROPATHY, WITH LONG-TERM CURRENT USE OF INSULIN: ICD-10-CM

## 2025-06-11 DIAGNOSIS — Z87.891 HISTORY OF SMOKING FOR MORE THAN 10 YEARS: ICD-10-CM

## 2025-06-11 DIAGNOSIS — G47.19 EXCESSIVE DAYTIME SLEEPINESS: ICD-10-CM

## 2025-06-11 DIAGNOSIS — R06.00 DYSPNEA, UNSPECIFIED TYPE: Primary | ICD-10-CM

## 2025-06-11 DIAGNOSIS — R80.9 MICROALBUMINURIA: ICD-10-CM

## 2025-06-11 DIAGNOSIS — E65 ABDOMINAL OBESITY: ICD-10-CM

## 2025-06-11 DIAGNOSIS — G62.9 PERIPHERAL NERVE DISEASE: ICD-10-CM

## 2025-06-11 DIAGNOSIS — R06.83 LOUD SNORING: ICD-10-CM

## 2025-06-11 LAB
OHS QRS DURATION: 82 MS
OHS QTC CALCULATION: 440 MS

## 2025-06-11 PROCEDURE — 99999 PR PBB SHADOW E&M-EST. PATIENT-LVL V: CPT | Mod: PBBFAC,,, | Performed by: INTERNAL MEDICINE

## 2025-06-11 NOTE — PROGRESS NOTES
Subjective:        Patient ID:  Umberto Okeefe is a 55 y.o. 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     Health literacy: medium  Vaccinations: up-to-date, completed COVID, had COVID infection in 2020, no residual  problem  Activities: pretty, lot of walking and heavy lifting at work, no problem, limited by leg weakness with neuropathy  Nicotine: former smoker, quit 2007, 20 year, up to 1 ppd  Alcohol: none in past 6 months  Illicit drugs: No  Cardiac concerns: SOB when aggravated and CP  Home BP: Yes, 120s /80s  Medication compliance: Yes, do not skip  Diet: Regular, no added salt  Caffeine: How much do you consume a day? 2  cups per week  Labs:   Sodium   Date Value Ref Range Status   12/30/2024 136 136 - 145 mmol/L Final     Potassium   Date Value Ref Range Status   12/30/2024 4.1 3.5 - 5.1 mmol/L Final     Chloride   Date Value Ref Range Status   12/30/2024 103 95 - 110 mmol/L Final     CO2   Date Value Ref Range Status   12/30/2024 22 (L) 23 - 29 mmol/L Final     Glucose   Date Value Ref Range Status   12/30/2024 149 (H) 70 - 110 mg/dL Final     BUN   Date Value Ref Range Status   12/30/2024 21 (H) 6 - 20 mg/dL Final     Creatinine   Date Value Ref Range Status   12/30/2024 1.2 0.5 - 1.4 mg/dL Final     Calcium   Date Value Ref Range Status   12/30/2024 9.1 8.7 - 10.5 mg/dL Final     Total Protein   Date Value Ref Range Status   12/30/2024 7.5 6.0 - 8.4 g/dL Final     Albumin   Date Value Ref Range Status   12/30/2024 3.9 3.5 - 5.2 g/dL Final     Total Bilirubin   Date Value Ref Range Status   12/30/2024 0.5 0.1 - 1.0 mg/dL Final     Comment:     For infants and newborns, interpretation of results should be based  on gestational age, weight  and in agreement with clinical  observations.    Premature Infant recommended reference ranges:  Up to 24 hours.............<8.0 mg/dL  Up to 48 hours............<12.0 mg/dL  3-5 days..................<15.0 mg/dL  6-29 days.................<15.0 mg/dL       Alkaline Phosphatase   Date Value Ref Range Status   12/30/2024 81 40 - 150 U/L Final     AST   Date Value Ref Range Status   12/30/2024 22 10 - 40 U/L Final     ALT   Date Value Ref Range Status   12/30/2024 26 10 - 44 U/L Final     Anion Gap   Date Value Ref Range Status   12/30/2024 11 8 - 16 mmol/L Final     eGFR   Date Value Ref Range Status   12/30/2024 >60.0 >60 mL/min/1.73 m^2 Final      Lab Results   Component Value Date    WBC 7.34 12/30/2024    RBC 4.95 12/30/2024    HGB 14.9 12/30/2024    HCT 44.2 12/30/2024    MCV 89 12/30/2024    MCH 30.1 12/30/2024    MCHC 33.7 12/30/2024    RDW 11.9 12/30/2024     12/30/2024    MPV 8.8 (L) 12/30/2024    GRAN 4.9 12/30/2024    GRAN 66.1 12/30/2024    LYMPH 1.4 12/30/2024    LYMPH 19.5 12/30/2024    MONO 0.8 12/30/2024    MONO 11.0 12/30/2024    EOS 0.2 12/30/2024    BASO 0.06 12/30/2024    EOSINOPHIL 2.3 12/30/2024    BASOPHIL 0.8 12/30/2024      Lab Results   Component Value Date    TSH 1.240 12/30/2024     Lab Results   Component Value Date    HGBA1C 7.2 (H) 04/07/2025      BNP   Date Value Ref Range Status   11/10/2024 <10 0 - 99 pg/mL Final     Comment:     Values of less than 100 pg/ml are consistent with non-CHF populations.   02/05/2022 <10 0 - 99 pg/mL Final     Comment:     Values of less than 100 pg/ml are consistent with non-CHF populations.     Troponin I   Date Value Ref Range Status   11/10/2024 <0.006 0.000 - 0.026 ng/mL Final     Comment:     The reference interval for Troponin I represents the 99th percentile   cutoff   for our facility and is consistent with 3rd generation assay   performance.     02/05/2022 0.006 0.000 - 0.026 ng/mL Final     Comment:     The reference interval for  Troponin I represents the 99th percentile   cutoff   for our facility and is consistent with 3rd generation assay   performance.       Microalbumin, Urine   Date Value Ref Range Status   12/30/2024 179.0 ug/mL Final   09/08/2023 80.0 ug/mL Final     eGFR   Date Value Ref Range Status   12/30/2024 >60.0 >60 mL/min/1.73 m^2 Final   11/10/2024 >60.0 >60 mL/min/1.73 m^2 Final     Lab Results   Component Value Date    CHOL 155 12/30/2024    CHOL 169 09/08/2023    CHOL 143 06/05/2023      Lab Results   Component Value Date    HDL 26 (L) 12/30/2024    HDL 32 (L) 09/08/2023    HDL 33 (L) 06/05/2023     Lab Results   Component Value Date    LDLCALC 97.4 12/30/2024    LDLCALC 113.2 09/08/2023    LDLCALC 75.0 06/05/2023      Lab Results   Component Value Date    TRIG 158 (H) 12/30/2024    TRIG 119 09/08/2023    TRIG 175 (H) 06/05/2023     Lab Results   Component Value Date    TOTALCHOLEST 6.0 (H) 12/30/2024    TOTALCHOLEST 5.3 (H) 09/08/2023    TOTALCHOLEST 4.3 06/05/2023     Lab Results   Component Value Date    NONHDLCHOL 129 12/30/2024    NONHDLCHOL 137 09/08/2023    NONHDLCHOL 110 06/05/2023     Lab Results   Component Value Date    CHOLHDL 16.8 (L) 12/30/2024    CHOLHDL 18.9 (L) 09/08/2023    CHOLHDL 23.1 06/05/2023 12/2024, urine positive for microalbuminuria, ratio 120    Last Echo:   No results found for this or any previous visit.    Last stress test:   No results found for this or any previous visit.     Cardiovascular angiogram: none   ECG:   Results for orders placed or performed during the hospital encounter of 11/10/24   EKG 12-lead    Collection Time: 11/10/24  3:44 PM   Result Value Ref Range    QRS Duration 80 ms    [facility]HS QTC Calculation 446 ms    Narrative    Test Reason : R06.02,    Vent. Rate : 083 BPM     Atrial Rate : 083 BPM     P-R Int : 216 ms          QRS Dur : 080 ms      QT Int : 380 ms       P-R-T Axes : 062 090 029 degrees     QTc Int : 446 ms    Sinus rhythm with 1st degree A-V block  Rightward  "axis  Borderline Abnormal ECG  When compared with ECG of 09-JUN-2022 09:30,  No significant change was found  Confirmed by Abdoulaye Vilchis MD ([id number]) on 11/11/2024 1:41:08 PM    Referred By: JEREMIE   SELF           Confirmed By:Abdoulaye Vilchis MD      Today: normal, rate 78, 1st degree AVB  Fundoscopic exam: annually, no retinopathy.    WM came post ED in 11/2024 for CV review. Was referred by Maicol Alba MD for review some time back. Only get SOB with aggravated over the past 4+ years. Happens weekly, last up to 5 minutes, Associated with left chest muscle cramps. No premature family history for CAD nor CVA. Overall ASCVD 10-year event risk is 11.9% on 40 mg of atorvastatin.     Rj Garner MD  noted 11/2024 "54-year-old male comes emergency with generalized shortness of breath over the last 5-6 months.  This was associated with some left arm pain and sharp stabbing pain of the left arm last night.  Was concerned and came to the emergency room for further evaluation.  Typically healthy otherwise.  Past medical history restless legs syndrome, neuropathy, hypertension, GERD, diabetes.  He is compliant with his medications.  The patient does not see a cardiologist.  He used to have a primary care physician, Dr. Alba but he has left town.  The patient appears stable this time.  Vital signs are stable all the the O2 saturation is 94%.    VS - 179/101  85  (!) 22  98.3 °F (36.8 °C)  97 %    CXR - The lungs are clear.  No focal consolidation, pleural effusion or pneumothorax.     Normal cardiomediastinal contour.    Discussion of management or test interpretation with external provider(s): The patient is stable this time.  Negative EKG, negative x-ray, negative laboratory values.  I will discharge patient home to follow up with the primary care physician and a cardiologist for a stress test.  I explained this to the patient and his wife. "    SANDRO 3/2024 - The right lower extremity ankle brachial index measures 1.2.  The " left lower extremity ankle brachial index measures 1.18.        Review of Systems   Constitutional: Positive for chills, diaphoresis, malaise/fatigue and weight gain. Negative for decreased appetite, fever, night sweats and weight loss.   HENT:  Positive for hearing loss. Negative for congestion, hoarse voice, nosebleeds, sore throat and tinnitus.    Eyes:  Positive for pain. Negative for double vision and visual disturbance.   Cardiovascular:  Positive for chest pain and palpitations. Negative for claudication, cyanosis, dyspnea on exertion, irregular heartbeat, leg swelling, near-syncope, orthopnea and paroxysmal nocturnal dyspnea.   Respiratory:  Positive for cough, shortness of breath and snoring (loud). Negative for sleep disturbances due to breathing, sputum production and wheezing.         [place] score 13, awaken mostly tired,    Endocrine: Positive for cold intolerance. Negative for heat intolerance, polydipsia and polyuria.   Hematologic/Lymphatic: Negative for bleeding problem. Bruises/bleeds easily.   Skin:  Negative for color change, flushing, nail changes, poor wound healing and suspicious lesions.   Musculoskeletal:  Positive for neck pain. Negative for arthritis, falls, gout, joint pain, joint swelling, muscle cramps, muscle weakness and myalgias.   Gastrointestinal:  Positive for heartburn. Negative for change in bowel habit, constipation, diarrhea, dysphagia, hematemesis, hematochezia, jaundice, melena and nausea.   Genitourinary:  Negative for bladder incontinence, frequency, hematuria, hesitancy and urgency.   Neurological:  Positive for headaches, numbness and weakness. Negative for disturbances in coordination, excessive daytime sleepiness, dizziness, focal weakness, light-headedness, loss of balance, paresthesias, seizures and vertigo.   Psychiatric/Behavioral:  Positive for memory loss. Negative for depression and substance abuse. The patient is nervous/anxious. The patient does not have  "insomnia.      Answers submitted by the patient for this visit:  Review of Symptoms (Submitted on 2/24/2025)  Sweats?: Yes  chest tightness: Yes  Difficulty breathing when lying down?: No  syncope: No       Objective:    Physical Exam  Constitutional:       Appearance: He is well-developed.      Comments: RA O2 sat 96%  Orthostatic BP: sitting 123/84, standing 126/82   HENT:      Head: Normocephalic.   Eyes:      Conjunctiva/sclera: Conjunctivae normal.      Pupils: Pupils are equal, round, and reactive to light.   Neck:      Thyroid: No thyromegaly.      Vascular: No JVD.   Cardiovascular:      Rate and Rhythm: Normal rate and regular rhythm.      Pulses: Intact distal pulses.           Carotid pulses are 1+ on the right side with bruit and 1+ on the left side with bruit.       Radial pulses are 1+ on the right side and 1+ on the left side.        Dorsalis pedis pulses are 1+ on the right side and 1+ on the left side.        Posterior tibial pulses are 1+ on the right side and 1+ on the left side.      Heart sounds: Normal heart sounds. No murmur heard.     No friction rub. No gallop.   Pulmonary:      Effort: Pulmonary effort is normal.      Breath sounds: No rales.      Comments: Diminished breath sounds and prolong expiration.  Chest:      Chest wall: No tenderness.   Abdominal:      General: Bowel sounds are normal.      Palpations: Abdomen is soft.      Tenderness: There is no abdominal tenderness.      Comments: Waist 41"   Musculoskeletal:         General: Normal range of motion.      Cervical back: Normal range of motion and neck supple.   Lymphadenopathy:      Cervical: No cervical adenopathy.   Skin:     General: Skin is warm and dry.      Findings: No rash.   Neurological:      Mental Status: He is alert and oriented to person, place, and time.           Assessment:       1. Dyspnea, unspecified type    2. Essential hypertension    3. Leg weakness, bilateral    4. Peripheral nerve disease    5. Type 2 " diabetes mellitus with diabetic polyneuropathy, with long-term current use of insulin    6. History of smoking for more than 10 years, quit 2007, 20 years, max 1 ppd    7. Prerenal azotemia    8. Cardiovascular event risk, ASCVD 10-year risk 11.9% on 40 mg of atorvastatin, 2024    9. Microalbuminuria, ratio 120 in 12/2024    10. Abdominal obesity    11. Precordial chest pain    12. Excessive daytime sleepiness    13. Loud snoring         Plan:       Umberto was seen today for shortness of breath.    Diagnoses and all orders for this visit:    Dyspnea, unspecified type  -     IN OFFICE EKG 12-LEAD (to [place])  -     Ambulatory referral/consult to Cardiology  -     Stress Echo Which stress agent will be used? Treadmill Exercise; Color Flow Doppler? Yes; Future    Essential hypertension  -     IN OFFICE EKG 12-LEAD (to [place])  -     empagliflozin (JARDIANCE) 10 mg tablet; Take 1 tablet (10 mg total) by mouth once daily.  -     Stress Echo Which stress agent will be used? Treadmill Exercise; Color Flow Doppler? Yes; Future    Leg weakness, bilateral    Peripheral nerve disease    Type 2 diabetes mellitus with diabetic polyneuropathy, with long-term current use of insulin  -     empagliflozin (JARDIANCE) 10 mg tablet; Take 1 tablet (10 mg total) by mouth once daily.  -     Stress Echo Which stress agent will be used? Treadmill Exercise; Color Flow Doppler? Yes; Future    History of smoking for more than 10 years, quit 2007, 20 years, max 1 ppd    Prerenal azotemia    Cardiovascular event risk, ASCVD 10-year risk 11.9% on 40 mg of atorvastatin, 2024  -     empagliflozin (JARDIANCE) 10 mg tablet; Take 1 tablet (10 mg total) by mouth once daily.  -     Stress Echo Which stress agent will be used? Treadmill Exercise; Color Flow Doppler? Yes; Future    Microalbuminuria, ratio 120 in 12/2024  -     empagliflozin (JARDIANCE) 10 mg tablet; Take 1 tablet (10 mg total) by mouth once daily.    Abdominal obesity  -     empagliflozin  (JARDIANCE) 10 mg tablet; Take 1 tablet (10 mg total) by mouth once daily.  -     Polysomnogram (CPAP will be added if patient meets diagnostic criteria.); Future    Precordial chest pain  -     Stress Echo Which stress agent will be used? Treadmill Exercise; Color Flow Doppler? Yes; Future    Excessive daytime sleepiness  -     Polysomnogram (CPAP will be added if patient meets diagnostic criteria.); Future    Loud snoring  -     Polysomnogram (CPAP will be added if patient meets diagnostic criteria.); Future     - All medical issues reviewed, willing to proceed with Jardiance Rx, adverse side effects discussed including possible genital fungal infection, and DKA, info given.   - Warning signs of MI and stroke given, if symptoms last more than 5 minutes, stop immediately and call 911, then chew 2-4 low-dose ASA (81 mg).   - Need to remain well hydrated but avoid drinking within 4 hours of bedtime. Recommend at least 110 oz of fluid daily per IOM (institute of Medicine) suggestion.   - CV status and all medications reviewed, patient acknowledge good understanding.  - Recommend healthy living: no nicotine, avoid alcohol, healthy diet and regular exercise aiming for fitness, and weight control  - Need good exercise program, 4 key elements: 1. Aerobic (walking, swimming, dancing, jogging, biking, etc, 2. Muscle strengthening / resistance exercise, need to do 2-3 times weekly, 3. Stretching daily, good stretch takes a whole  total minute. 4. Balance exercise daily.   - Instruction for Mediterranean diet and heart healthy exercise given.  - Recommend checking home blood pressure, 2 days weekly, do 2 readings within 5 minutes in AM and PM, keep log for review with provider. Target resting BP is less than 130/80.   - Weigh twice weekly, try to lose 1-2 lbs per week. Target weight loss of 5%-10%.  - Highly recommend 30-60 minutes of exercise / activities daily, can have Sunday off, with 2-3 sessions of muscle strengthening  weekly. A  would be very helpful.  - Recommend at least annual cardiovascular evaluation in view of patient's significant risk factors. Patient's preferene  - Phone review / encourage use of [facility]     Total time spend including review of record prior to face-to-face visit is 60 minutes. Greater than 50% of the time was spent in counseling and coordination of care. The above assessment and plan have been discussed at length. Referring provider's note reviewed. Labs and procedure over the last 6 months reviewed. Problem List updated. Asked to bring in all active medications / pills bottles with next visit. Will send note to referring / PCP.

## 2025-06-11 NOTE — PATIENT INSTRUCTIONS
Recommended Mediterranean dietEating Heart-Healthy Food: Using the DASH Plan  Eating for your heart doesnt have to be hard or boring. You just need to know how to make healthier choices. The DASH eating plan has been developed to help you do just that. DASH stands for Dietary Approaches to Stop Hypertension. It is a plan that has been proven to be healthier for your heart and to lower your risk for high blood pressure. It can also help lower your risk for cancer, heart disease, osteoporosis, and diabetes.  Choosing from Each Food Group  Choose foods from each of the food groups below each day. Try to get the recommended number of servings for each food group. The serving numbers are based on a diet of 2,000 calories a day. Talk to your doctor if youre unsure about your calorie needs.  Grains   Servings: 7-8 a day  A serving is:  1 slice bread  1 ounce dry cereal  half a cup cooked rice or pasta  Best choices: Whole grains and any grains high in fiber.  Vegetables   Servings: 4-5 a day  A serving is:  1 cup raw leafy vegetable  Half a cup cooked vegetable  Three-quarter cup vegetable juice  Best choices: Fresh or frozen vegetable prepared without too much added salt or fat.    Fruits   Servings: 4-5 a day  A serving is:  Three-quarter cup fruit juice  1 medium fruit  One-quarter cup dried fruit  One-half cup fresh, frozen, or canned fruit  Best choices: A variety of fresh fruits of different colors. Whole fruits are a much better choice than fruit juices.  Low-fat or Fat Free Dairy   Servings: 2-3 a day  A serving is:  8 ounces milk  1 cup yogurt  One and a half ounces cheese  Best choices: Skim or 1% milk, low-fat or fat free yogurt or buttermilk, and low-fat cheeses.       Meat, Poultry, Fish   Servings: 2 or fewer a day  A serving is:  3 ounces cooked meat, poultry, or fish  Best choices: Lean meats and fish. Trim away visible fat. Broil, roast, or boil instead of frying. Remove skin from poultry before eating.   Nuts, Seeds, Beans   Servings: 4-5 a week  A serving is:  One third cup nuts (or one and a half ounces)  2 tablespoons sunflower seeds  Half a cup cooked beans  Best choices: Dry roasted nuts with no salt added, lentils, kidney beans, garbanzo beans, and whole delgado beans.    Fats and Oils   Servings: 2 a day  A serving is:  1 teaspoon vegetable oil  1 teaspoon soft margarine  1 tablespoon low-fat mayonnaise  1 teaspoon regular mayonnaise  2 tablespoons light salad dressing  1 tablespoon regular salad dressing  Best choices: Monounsaturated and polyunsaturated fats such as olive, canola, or safflower oil.  Sweets   Servings: 5 a week or fewer  A serving is:  1 tablespoon sugar, maple syrup, or honey  1 tablespoon jam or jelly  1 half-ounce jelly beans (about 15)  8 ounces lemonade  Best choices: Dried fruit can be a satisfying sweet. Choose low-fat sweets when possible. And watch your serving sizes!       Aerobic Exercise for a Healthy Heart  Exercise is a lot more than an energy booster and a stress reliever. It also strengthens your heart muscle, lowers your blood pressure and blood cholesterol, and burns calories.      Remember, some activity is better than none.     Choose an Aerobic Activity  Choose a nonstop activity that makes your heart and lungs work harder than they do when you rest or walk normally. This aerobic exercise can improve the way your heart and other muscles use oxygen. Make it fun by exercising with a friend and choosing an activity you enjoy. Here are some ideas:  Walking  Swimming  Bicycling  Stair climbing  Dancing  Jogging  Exercise Regularly  If you havent been exercising regularly,  get your doctors okay first. Then start slowly.  Here are some tips:  Begin exercising 3 times a week for 5-10 minutes at a time.  When you feel comfortable, add a few minutes each week.  Slowly build up to exercising 3-4 times each week for 20-40 minutes. Aim for a total of 150 or more minutes a  week.  Be sure to carry your nitroglycerin with you when you exercise.  If you get angina when youre exercising, stop what youre doing, take your nitroglycerin, and call your doctor.  © 5081-0744 [employer], [address] [place] [address], [place] [address]. All rights reserved. This information is not intended as a substitute for professional medical care. Always follow your healthcare professional's instructions.     Losing Weight (Cardiovascular)  Excess weight is a major risk factor for heart disease. Losing weight may help keep your arteries open so that your heart can get the oxygen-rich blood it needs. Weight loss can also help lower your blood pressure and reduce your risk for diabetes. All in all, losing weight makes you healthier.          Exercise with a friend. When activity is fun, you're more likely to stick with it.        Calories and Weight Loss  Calories are the fuel your body burns for energy. You get the calories you need from the food you eat. For healthy weight loss, women should eat at least 1,200 calories a day, men at least 1,500.    When you eat more calories than you need, your body stores the extra calories as fat. One pound of fat equals 3,500 calories.    To lose weight, try to burn 500 calories a day more than you eat. To do this, eat 250 calories less each day. Add activity to burn the other 250 calories. Walking 21/2 miles burns about 250 calories.    Eat a variety of healthy foods. Its the best way to make calories count.     Tips for losing weight:  Drink 8 to 10 glasses of water a day.    Dont skip meals. Instead, eat smaller portions.       Brisk Activity Is Best  Brisk activity gets your heart pumping faster. It makes your heart healthier. Its also the best way to burn calories. In fact, your body may keep burning calories for hours after you stop a brisk activity.    Begin by walking 10 minutes most days.    Add more time and speed to your walk. Build up as you feel  able.    Try to walk briskly at least 30 minutes most days. If needed, you can break this into 2 shorter sessions.     Check off the ideas below that you could try to make your day more active:    Take the stairs instead of the elevator.    Park your car farther away and walk.    Ride a bike to work or to the store.    Walk laps around the mall.

## 2025-06-12 ENCOUNTER — PATIENT MESSAGE (OUTPATIENT)
Dept: ADMINISTRATIVE | Facility: HOSPITAL | Age: 55
End: 2025-06-12
Payer: COMMERCIAL

## 2025-06-20 DIAGNOSIS — G47.30 HYPERSOMNIA WITH SLEEP APNEA: Primary | ICD-10-CM

## 2025-06-20 DIAGNOSIS — E65 ABDOMINAL OBESITY: ICD-10-CM

## 2025-06-20 DIAGNOSIS — R06.83 LOUD SNORING: ICD-10-CM

## 2025-06-20 DIAGNOSIS — G47.10 HYPERSOMNIA WITH SLEEP APNEA: Primary | ICD-10-CM

## 2025-06-20 DIAGNOSIS — G47.19 EXCESSIVE DAYTIME SLEEPINESS: ICD-10-CM

## 2025-06-25 ENCOUNTER — HOSPITAL ENCOUNTER (OUTPATIENT)
Dept: CARDIOLOGY | Facility: HOSPITAL | Age: 55
Discharge: HOME | End: 2025-06-25
Attending: INTERNAL MEDICINE
Payer: COMMERCIAL

## 2025-06-25 ENCOUNTER — RESULTS FOLLOW-UP (OUTPATIENT)
Dept: CARDIOLOGY | Facility: HOSPITAL | Age: 55
End: 2025-06-25

## 2025-06-25 VITALS — HEIGHT: 70 IN | WEIGHT: 218 LBS | BODY MASS INDEX: 31.21 KG/M2

## 2025-06-25 DIAGNOSIS — E11.42 TYPE 2 DIABETES MELLITUS WITH DIABETIC POLYNEUROPATHY, WITH LONG-TERM CURRENT USE OF INSULIN: ICD-10-CM

## 2025-06-25 DIAGNOSIS — R07.2 PRECORDIAL CHEST PAIN: ICD-10-CM

## 2025-06-25 DIAGNOSIS — Z91.89 CARDIOVASCULAR EVENT RISK: ICD-10-CM

## 2025-06-25 DIAGNOSIS — Z79.4 TYPE 2 DIABETES MELLITUS WITH DIABETIC POLYNEUROPATHY, WITH LONG-TERM CURRENT USE OF INSULIN: ICD-10-CM

## 2025-06-25 DIAGNOSIS — R06.00 DYSPNEA, UNSPECIFIED TYPE: ICD-10-CM

## 2025-06-25 DIAGNOSIS — I10 ESSENTIAL HYPERTENSION: ICD-10-CM

## 2025-06-25 LAB
AORTIC ROOT ANNULUS: 3.1 CM
AORTIC SIZE INDEX (SOV): 1.3 CM/M2
AORTIC SIZE INDEX: 1.4 CM/M2
AORTIC VALVE CUSP SEPERATION: 1.47 CM
ASCENDING AORTA: 3.1 CM
AV INDEX (PROSTH): 0.53
AV MEAN GRADIENT: 3 MMHG
AV PEAK GRADIENT: 5 MMHG
AV VALVE AREA BY VELOCITY RATIO: 1.7 CM²
AV VALVE AREA: 1.7 CM²
AV VELOCITY RATIO: 0.55
BSA FOR ECHO PROCEDURE: 2.21 M2
CV ECHO LV RWT: 0.7 CM
CV STRESS BASE HR: 86 BPM
DIASTOLIC BLOOD PRESSURE: 81 MMHG
DOP CALC AO PEAK VEL: 1.1 M/S
DOP CALC AO VTI: 24.9 CM
DOP CALC LVOT AREA: 3.1 CM2
DOP CALC LVOT DIAMETER: 2 CM
DOP CALC LVOT PEAK VEL: 0.6 M/S
DOP CALC LVOT STROKE VOLUME: 41.4 CM3
DOP CALC MV VTI: 22.9 CM
DOP CALCLVOT PEAK VEL VTI: 13.2 CM
E WAVE DECELERATION TIME: 213 MSEC
E/A RATIO: 0.8
E/E' RATIO: 6 M/S
ECHO LV POSTERIOR WALL: 1.3 CM (ref 0.6–1.1)
EJECTION FRACTION: 69 %
FRACTIONAL SHORTENING: 37.8 % (ref 28–44)
GLOBAL LONGITUIDAL STRAIN: 16 %
INTERVENTRICULAR SEPTUM: 1.3 CM (ref 0.6–1.1)
IVC DIAMETER: 1.8 CM
LA MAJOR: 3.3 CM
LA MINOR: 2.4 CM
LEFT ATRIUM AREA SYSTOLIC (APICAL 2 CHAMBER): 5.33 CM2
LEFT ATRIUM AREA SYSTOLIC (APICAL 4 CHAMBER): 8.26 CM2
LEFT ATRIUM SIZE: 3.9 CM
LEFT ATRIUM VOLUME INDEX MOD: 6 ML/M2
LEFT ATRIUM VOLUME MOD: 12 ML
LEFT INTERNAL DIMENSION IN SYSTOLE: 2.3 CM (ref 2.1–4)
LEFT VENTRICLE DIASTOLIC VOLUME INDEX: 27.19 ML/M2
LEFT VENTRICLE DIASTOLIC VOLUME: 59 ML
LEFT VENTRICLE END SYSTOLIC VOLUME APICAL 2 CHAMBER: 7.73 ML
LEFT VENTRICLE END SYSTOLIC VOLUME APICAL 4 CHAMBER: 13.12 ML
LEFT VENTRICLE MASS INDEX: 76.7 G/M2
LEFT VENTRICLE SYSTOLIC VOLUME INDEX: 8.3 ML/M2
LEFT VENTRICLE SYSTOLIC VOLUME: 18 ML
LEFT VENTRICULAR INTERNAL DIMENSION IN DIASTOLE: 3.7 CM (ref 3.5–6)
LEFT VENTRICULAR MASS: 166.5 G
LV LATERAL E/E' RATIO: 5.4 M/S
LV SEPTAL E/E' RATIO: 6.1 M/S
LVED V (TEICH): 58.79 ML
LVES V (TEICH): 18.38 ML
LVOT MG: 0.76 MMHG
LVOT MV: 0.41 CM/S
MV MEAN GRADIENT: 1 MMHG
MV PEAK A VEL: 0.61 M/S
MV PEAK E VEL: 0.49 M/S
MV PEAK GRADIENT: 2 MMHG
MV STENOSIS PRESSURE HALF TIME: 61.9 MS
MV VALVE AREA BY CONTINUITY EQUATION: 1.81 CM2
MV VALVE AREA P 1/2 METHOD: 3.55 CM2
OHS CV CPX 1 MINUTE RECOVERY HEART RATE: 113 BPM
OHS CV CPX 85 PERCENT MAX PREDICTED HEART RATE MALE: 141
OHS CV CPX ESTIMATED METS: 10
OHS CV CPX MAX PREDICTED HEART RATE: 166
OHS CV CPX PATIENT IS FEMALE: 0
OHS CV CPX PATIENT IS MALE: 1
OHS CV CPX PEAK DIASTOLIC BLOOD PRESSURE: 84 MMHG
OHS CV CPX PEAK HEAR RATE: 131 BPM
OHS CV CPX PEAK RATE PRESSURE PRODUCT: ABNORMAL
OHS CV CPX PEAK SYSTOLIC BLOOD PRESSURE: 177 MMHG
OHS CV CPX PERCENT MAX PREDICTED HEART RATE ACHIEVED: 79
OHS CV CPX RATE PRESSURE PRODUCT PRESENTING: ABNORMAL
OHS CV RV/LV RATIO: 0.89 CM
PISA MRMAX VEL: 1.73 M/S
PISA TR MAX VEL: 1.3 M/S
PULM VEIN S/D RATIO: 1.03
PV MV: 0.67 M/S
PV PEAK D VEL: 0.34 M/S
PV PEAK GRADIENT: 4 MMHG
PV PEAK S VEL: 0.35 M/S
PV PEAK VELOCITY: 0.94 M/S
RA MAJOR: 3.38 CM
RA PRESSURE ESTIMATED: 3 MMHG
RA WIDTH: 2.66 CM
RIGHT VENTRICLE DIASTOLIC BASEL DIMENSION: 3.3 CM
RIGHT VENTRICLE DIASTOLIC LENGTH: 5.4 CM
RIGHT VENTRICLE DIASTOLIC MID DIMENSION: 2.5 CM
RIGHT VENTRICLE FREE WALL STRAIN: 14.4 %
RIGHT VENTRICLE GLOBAL SYSTOLIC STRAIN: 15 %
RIGHT VENTRICULAR END-DIASTOLIC DIMENSION: 3.32 CM
RIGHT VENTRICULAR LENGTH IN DIASTOLE (APICAL 4-CHAMBER VIEW): 5.4 CM
RV MID DIAMA: 2.5 CM
RV TB RVSP: 4 MMHG
SINUS: 2.9 CM
STJ: 3 CM
STRESS ECHO POST EXERCISE DUR MIN: 7 MINUTES
STRESS ECHO POST EXERCISE DUR SEC: 36 SECONDS
SYSTOLIC BLOOD PRESSURE: 125 MMHG
TDI LATERAL: 0.09 M/S
TDI SEPTAL: 0.08 M/S
TDI: 0.09 M/S
TR MAX PG: 7 MMHG
TRICUSPID ANNULAR PLANE SYSTOLIC EXCURSION: 2.2 CM
TRICUSPID VALVE PEAK A WAVE VELOCITY: 0.32 M/S
TV PEAK E VEL: 0.4 M/S
TV REST PULMONARY ARTERY PRESSURE: 10 MMHG
Z-SCORE OF LEFT VENTRICULAR DIMENSION IN END DIASTOLE: -6.63
Z-SCORE OF LEFT VENTRICULAR DIMENSION IN END SYSTOLE: -5.07

## 2025-06-25 PROCEDURE — 93325 DOPPLER ECHO COLOR FLOW MAPG: CPT

## 2025-06-25 PROCEDURE — 93351 STRESS TTE COMPLETE: CPT | Mod: 26,,, | Performed by: INTERNAL MEDICINE

## 2025-06-25 PROCEDURE — 93325 DOPPLER ECHO COLOR FLOW MAPG: CPT | Mod: 26,,, | Performed by: INTERNAL MEDICINE

## 2025-06-25 PROCEDURE — 93356 MYOCRD STRAIN IMG SPCKL TRCK: CPT | Mod: ,,, | Performed by: INTERNAL MEDICINE

## 2025-06-25 PROCEDURE — 93320 DOPPLER ECHO COMPLETE: CPT | Mod: 26,,, | Performed by: INTERNAL MEDICINE

## 2025-06-30 DIAGNOSIS — G47.10 HYPERSOMNIA WITH SLEEP APNEA: Primary | ICD-10-CM

## 2025-06-30 DIAGNOSIS — G47.30 HYPERSOMNIA WITH SLEEP APNEA: Primary | ICD-10-CM

## 2025-07-09 ENCOUNTER — LAB VISIT (OUTPATIENT)
Dept: LAB | Facility: HOSPITAL | Age: 55
End: 2025-07-09
Attending: FAMILY MEDICINE
Payer: COMMERCIAL

## 2025-07-09 ENCOUNTER — OFFICE VISIT (OUTPATIENT)
Dept: FAMILY MEDICINE | Facility: CLINIC | Age: 55
End: 2025-07-09
Payer: COMMERCIAL

## 2025-07-09 VITALS
WEIGHT: 214.38 LBS | HEIGHT: 70 IN | DIASTOLIC BLOOD PRESSURE: 80 MMHG | BODY MASS INDEX: 30.69 KG/M2 | OXYGEN SATURATION: 96 % | HEART RATE: 86 BPM | SYSTOLIC BLOOD PRESSURE: 120 MMHG

## 2025-07-09 DIAGNOSIS — Z11.4 SCREENING FOR HIV (HUMAN IMMUNODEFICIENCY VIRUS): ICD-10-CM

## 2025-07-09 DIAGNOSIS — Z79.4 DIABETES MELLITUS TYPE 2, INSULIN DEPENDENT: ICD-10-CM

## 2025-07-09 DIAGNOSIS — Z23 NEED FOR VACCINATION: ICD-10-CM

## 2025-07-09 DIAGNOSIS — E11.9 DIABETES MELLITUS TYPE 2, INSULIN DEPENDENT: Primary | ICD-10-CM

## 2025-07-09 DIAGNOSIS — E11.9 DIABETES MELLITUS TYPE 2, INSULIN DEPENDENT: ICD-10-CM

## 2025-07-09 DIAGNOSIS — E78.2 MIXED HYPERLIPIDEMIA: ICD-10-CM

## 2025-07-09 DIAGNOSIS — I10 ESSENTIAL HYPERTENSION: ICD-10-CM

## 2025-07-09 DIAGNOSIS — Z79.4 DIABETES MELLITUS TYPE 2, INSULIN DEPENDENT: Primary | ICD-10-CM

## 2025-07-09 LAB
EAG (OHS): 148 MG/DL (ref 68–131)
HBA1C MFR BLD: 6.8 % (ref 4–5.6)
HIV 1+2 AB+HIV1 P24 AG SERPL QL IA: NORMAL

## 2025-07-09 PROCEDURE — 83036 HEMOGLOBIN GLYCOSYLATED A1C: CPT

## 2025-07-09 PROCEDURE — 99999 PR PBB SHADOW E&M-EST. PATIENT-LVL IV: CPT | Mod: PBBFAC,,, | Performed by: FAMILY MEDICINE

## 2025-07-09 PROCEDURE — 87389 HIV-1 AG W/HIV-1&-2 AB AG IA: CPT

## 2025-07-09 PROCEDURE — 36415 COLL VENOUS BLD VENIPUNCTURE: CPT

## 2025-07-10 PROBLEM — E78.2 MIXED HYPERLIPIDEMIA: Status: ACTIVE | Noted: 2025-07-10

## 2025-07-10 NOTE — ASSESSMENT & PLAN NOTE
Blood pressure at goal.  Continue home monitoring.  Dash diet reviewed.  Encouraged weight loss: 5-10 lb over the next 3-4 months.

## 2025-07-10 NOTE — PROGRESS NOTES
"[facility] 2[facility] Family Medicine      Subjective         Chief Complaint   Patient presents with    [facility] Maintenance      Pleasant 55-year-old  male with known insulin-dependent diabetes mellitus, hypertension, hyperlipidemia and obesity presents for follow up in chronic care management.  Reports he is taking medication as prescribed, a.m. blood sugars less than 120 mg/dL.  Blood sugars have been trending down slowly since his last visit.  Ucrt tragus to eat a healthy diet and remain physically active.  He reports no low blood sugar since his last visit.  He has been feeling well.  Mood stable.  Due for Shingrix vaccination.    Past Medical History:   Diagnosis Date    Diabetes mellitus, type 2     GERD (gastroesophageal reflux disease)     Hypertension     Neuropathy     Restless leg syndrome         Past Surgical History:   Procedure Laterality Date    KNEE SURGERY      REPAIR OF ACHILLES TENDON Right 6/9/2022    Procedure: REPAIR, TENDON, ACHILLES;  Surgeon: Andrew Lawson DO;  Location: [facility] OR;  Service: Orthopedics;  Laterality: Right;        Review of patient's allergies indicates:   Allergen Reactions    Topamax [topiramate]      Mental agitation     Montelukast Nausea And Vomiting        Review of Systems   Constitutional: Negative.    HENT: Negative.     Respiratory: Negative.     Cardiovascular: Negative.    Gastrointestinal: Negative.    Genitourinary: Negative.    Musculoskeletal: Negative.    Neurological: Negative.    Endo/Heme/Allergies: Negative.    Psychiatric/Behavioral: Negative.                 Objective      Vitals:    07/09/25 1607   BP: 120/80   BP Location: Left arm   Patient Position: Sitting   Pulse: 86   SpO2: 96%   Weight: 97.3 kg (214 lb 6.4 oz)   Height: 5' 10" (1.778 m)        Physical Exam  Vitals reviewed.   Constitutional:       Appearance: Normal appearance. He is obese.   HENT:      Head: Normocephalic and atraumatic.      Right Ear: Tympanic membrane and " ear canal normal.      Left Ear: Tympanic membrane and ear canal normal.      Ears:      Comments: Nonobstructive cerumen bilaterally, TM landmarks distinct.     Mouth/Throat:      Mouth: Mucous membranes are moist.      Pharynx: Oropharynx is clear.   Eyes:      Extraocular Movements: Extraocular movements intact.      Conjunctiva/sclera: Conjunctivae normal.   Cardiovascular:      Rate and Rhythm: Normal rate and regular rhythm.   Pulmonary:      Effort: Pulmonary effort is normal.      Breath sounds: Normal breath sounds.   Abdominal:      General: Abdomen is flat.      Palpations: Abdomen is soft. There is no mass.      Tenderness: There is no abdominal tenderness.   Musculoskeletal:         General: No swelling or tenderness. Normal range of motion.      Cervical back: Neck supple.   Skin:     General: Skin is warm and dry.      Capillary Refill: Capillary refill takes less than 2 seconds.   Neurological:      General: No focal deficit present.      Mental Status: He is alert.   Psychiatric:         Mood and Affect: Mood normal.         Lab Results   Component Value Date    TSH 1.240 12/30/2024    TSH 1.383 12/19/2023    TSH 1.032 09/08/2023        Lab Results   Component Value Date    HGBA1C 6.8 (H) 07/09/2025    HGBA1C 7.2 (H) 04/07/2025    HGBA1C 7.0 (H) 12/30/2024      Lab Results   Component Value Date    HGBA1C 6.8 (H) 07/09/2025    HGBA1C 7.2 (H) 04/07/2025    HGBA1C 7.0 (H) 12/30/2024     CMP  Sodium   Date Value Ref Range Status   12/30/2024 136 136 - 145 mmol/L Final   11/10/2024 137 136 - 145 mmol/L Final   06/28/2024 138 136 - 145 mmol/L Final     Potassium   Date Value Ref Range Status   12/30/2024 4.1 3.5 - 5.1 mmol/L Final   11/10/2024 4.0 3.5 - 5.1 mmol/L Final   06/28/2024 4.3 3.5 - 5.1 mmol/L Final     Chloride   Date Value Ref Range Status   12/30/2024 103 95 - 110 mmol/L Final   11/10/2024 105 95 - 110 mmol/L Final   06/28/2024 102 95 - 110 mmol/L Final     CO2   Date Value Ref Range Status    12/30/2024 22 (L) 23 - 29 mmol/L Final   11/10/2024 21 (L) 23 - 29 mmol/L Final   06/28/2024 26 23 - 29 mmol/L Final     Glucose   Date Value Ref Range Status   12/30/2024 149 (H) 70 - 110 mg/dL Final   11/10/2024 106 70 - 110 mg/dL Final   06/28/2024 119 (H) 70 - 110 mg/dL Final     BUN   Date Value Ref Range Status   12/30/2024 21 (H) 6 - 20 mg/dL Final   11/10/2024 14 6 - 20 mg/dL Final   06/28/2024 16 6 - 20 mg/dL Final     Creatinine   Date Value Ref Range Status   12/30/2024 1.2 0.5 - 1.4 mg/dL Final   11/10/2024 1.1 0.5 - 1.4 mg/dL Final   06/28/2024 1.2 0.5 - 1.4 mg/dL Final     Calcium   Date Value Ref Range Status   12/30/2024 9.1 8.7 - 10.5 mg/dL Final   11/10/2024 9.4 8.7 - 10.5 mg/dL Final   06/28/2024 9.7 8.7 - 10.5 mg/dL Final     Total Protein   Date Value Ref Range Status   12/30/2024 7.5 6.0 - 8.4 g/dL Final   11/10/2024 7.7 6.0 - 8.4 g/dL Final   06/28/2024 7.8 6.0 - 8.4 g/dL Final     Albumin   Date Value Ref Range Status   12/30/2024 3.9 3.5 - 5.2 g/dL Final   11/10/2024 4.1 3.5 - 5.2 g/dL Final   06/28/2024 4.2 3.5 - 5.2 g/dL Final     Total Bilirubin   Date Value Ref Range Status   12/30/2024 0.5 0.1 - 1.0 mg/dL Final     Comment:     For infants and newborns, interpretation of results should be based  on gestational age, weight and in agreement with clinical  observations.    Premature Infant recommended reference ranges:  Up to 24 hours.............<8.0 mg/dL  Up to 48 hours............<12.0 mg/dL  3-5 days..................<15.0 mg/dL  6-29 days.................<15.0 mg/dL     11/10/2024 0.5 0.1 - 1.0 mg/dL Final     Comment:     For infants and newborns, interpretation of results should be based  on gestational age, weight and in agreement with clinical  observations.    Premature Infant recommended reference ranges:  Up to 24 hours.............<8.0 mg/dL  Up to 48 hours............<12.0 mg/dL  3-5 days..................<15.0 mg/dL  6-29 days.................<15.0 mg/dL     06/28/2024  0.8 0.1 - 1.0 mg/dL Final     Comment:     For infants and newborns, interpretation of results should be based  on gestational age, weight and in agreement with clinical  observations.    Premature Infant recommended reference ranges:  Up to 24 hours.............<8.0 mg/dL  Up to 48 hours............<12.0 mg/dL  3-5 days..................<15.0 mg/dL  6-29 days.................<15.0 mg/dL       Alkaline Phosphatase   Date Value Ref Range Status   12/30/2024 81 40 - 150 U/L Final   11/10/2024 73 40 - 150 U/L Final   06/28/2024 75 55 - 135 U/L Final     AST   Date Value Ref Range Status   12/30/2024 22 10 - 40 U/L Final   11/10/2024 23 10 - 40 U/L Final   06/28/2024 18 10 - 40 U/L Final     ALT   Date Value Ref Range Status   12/30/2024 26 10 - 44 U/L Final   11/10/2024 30 10 - 44 U/L Final   06/28/2024 22 10 - 44 U/L Final     Anion Gap   Date Value Ref Range Status   12/30/2024 11 8 - 16 mmol/L Final   11/10/2024 11 8 - 16 mmol/L Final   06/28/2024 10 8 - 16 mmol/L Final     eGFR if    Date Value Ref Range Status   06/03/2022 >60.0 >60 mL/min/1.73 m^2 Final   05/09/2022 >60.0 >60 mL/min/1.73 m^2 Final   02/05/2022 >60.0 >60 mL/min/1.73 m^2 Final     eGFR if non    Date Value Ref Range Status   06/03/2022 >60.0 >60 mL/min/1.73 m^2 Final     Comment:     Calculation used to obtain the estimated glomerular filtration  rate (eGFR) is the CKD-EPI equation.      05/09/2022 >60.0 >60 mL/min/1.73 m^2 Final     Comment:     Calculation used to obtain the estimated glomerular filtration  rate (eGFR) is the CKD-EPI equation.      02/05/2022 58.2 (A) >60 mL/min/1.73 m^2 Final     Comment:     Calculation used to obtain the estimated glomerular filtration  rate (eGFR) is the CKD-EPI equation.         Lab Results   Component Value Date    CHOL 155 12/30/2024    CHOL 169 09/08/2023    CHOL 143 06/05/2023     Lab Results   Component Value Date    HDL 26 (L) 12/30/2024    HDL 32 (L) 09/08/2023    HDL  "33 (L) 06/05/2023     Lab Results   Component Value Date    LDLCALC 97.4 12/30/2024    LDLCALC 113.2 09/08/2023    LDLCALC 75.0 06/05/2023     Lab Results   Component Value Date    TRIG 158 (H) 12/30/2024    TRIG 119 09/08/2023    TRIG 175 (H) 06/05/2023     Lab Results   Component Value Date    CHOLHDL 16.8 (L) 12/30/2024    CHOLHDL 18.9 (L) 09/08/2023    CHOLHDL 23.1 06/05/2023          Medication List with Changes/Refills   Current Medications    ALBUTEROL (PROVENTIL/VENTOLIN HFA) 90 MCG/ACTUATION INHALER    Inhale 1-2 puffs into the lungs every 6 (six) hours as needed for Wheezing. Rescue    ATORVASTATIN (LIPITOR) 40 MG TABLET    Take 1 tablet (40 mg total) by mouth once daily.    BASAGLAR KWIKPEN U-100 INSULIN 100 UNIT/ML (3 ML) INPN PEN    INJECT 50 UNITS SUBCUTANEOUSLY IN THE EVENING    CLOTRIMAZOLE (LOTRIMIN) 1 % SOLN    Apply topically 2 (two) times daily.    DICLOFENAC SODIUM (VOLTAREN) 1 % GEL    APPLY 2 GRAMS TO THE AFFECTED AREA 3 TIMES DAILY    EMPAGLIFLOZIN (JARDIANCE) 10 MG TABLET    Take 1 tablet (10 mg total) by mouth once daily.    GABAPENTIN (NEURONTIN) 800 MG TABLET    Take 1 tablet (800 mg total) by mouth once daily. Taking once a day    LISINOPRIL 10 MG TABLET    Take 1 tablet (10 mg total) by mouth once daily.    OZEMPIC 2 MG/DOSE (8 MG/3 ML) PNIJ    INJECT 2 MG INTO THE SKIN EVERY 7 DAYS **DOSE INCEASE**    PEN NEEDLE, DIABETIC 31 GAUGE X 5/16" NDLE    Use as directed BID and PRN. Dx: E11.9    THIN LANCETS 26 GAUGE MISC        TRUE METRIX GLUCOSE METER MISC        TRUE METRIX GLUCOSE TEST STRIP STRP    Use as directed to check blood glucose twice daily and PRN. Dx E11.9.   Discontinued Medications    CETIRIZINE (ZYRTEC) 10 MG TABLET    Take 1 tablet (10 mg total) by mouth once daily.    TADALAFIL (CIALIS) 10 MG TABLET    Take 1 tablet (10 mg total) by mouth daily as needed for Erectile Dysfunction.           Assessment & Plan     Assessment & Plan  Diabetes mellitus type 2, insulin " dependent  A1c and home glucometer readings representative of ample control.  Continue to work on diet and exercise to maintain/lower BMI.  Risk for hypoglycemia, common symptoms reviewed.  For annual dilated eye,    Orders:    Diabetic Eye Screening Photo; Future    Need for vaccination  Shingrix vaccination completed today.  We will need Shingrix 2.    Orders:    varicella zoster (Shingrix) IM vaccine (>/= 51 yo)    Essential hypertension, dx 2019  Blood pressure at goal.  Continue home monitoring.  Dash diet reviewed.  Encouraged weight loss: 5-10 lb over the next 3-4 months.         Mixed hyperlipidemia  LDL goal less than 100 mg/dL.  At goal.  Continue statin therapy and a low-cholesterol diet.          Return to clinic November annual wellness visit,     Jama Garcias MD  [facility] 2nd Floor Family Medicine  [address] [place] [address],[place] [id number] [phone]

## 2025-08-14 ENCOUNTER — PROCEDURE VISIT (OUTPATIENT)
Dept: SLEEP MEDICINE | Facility: HOSPITAL | Age: 55
End: 2025-08-14
Attending: INTERNAL MEDICINE
Payer: COMMERCIAL

## 2025-08-14 DIAGNOSIS — G47.10 HYPERSOMNIA WITH SLEEP APNEA: ICD-10-CM

## 2025-08-14 DIAGNOSIS — G47.30 HYPERSOMNIA WITH SLEEP APNEA: ICD-10-CM

## 2025-08-14 PROCEDURE — G0399 HOME SLEEP TEST/TYPE 3 PORTA: HCPCS

## 2025-08-27 DIAGNOSIS — E11.9 TYPE 2 DIABETES MELLITUS WITHOUT COMPLICATION, WITHOUT LONG-TERM CURRENT USE OF INSULIN: ICD-10-CM

## 2025-08-27 RX ORDER — INSULIN GLARGINE 100 [IU]/ML
50 INJECTION, SOLUTION SUBCUTANEOUS NIGHTLY
Qty: 45 ML | Refills: 0 | Status: SHIPPED | OUTPATIENT
Start: 2025-08-27

## 2025-08-31 DIAGNOSIS — E11.9 TYPE 2 DIABETES MELLITUS WITHOUT COMPLICATION, WITHOUT LONG-TERM CURRENT USE OF INSULIN: ICD-10-CM

## 2025-09-02 RX ORDER — SEMAGLUTIDE 2.68 MG/ML
2 INJECTION, SOLUTION SUBCUTANEOUS WEEKLY
Qty: 9 ML | Refills: 1 | Status: SHIPPED | OUTPATIENT
Start: 2025-09-02

## 2025-09-03 ENCOUNTER — PATIENT MESSAGE (OUTPATIENT)
Dept: FAMILY MEDICINE | Facility: CLINIC | Age: 55
End: 2025-09-03
Payer: COMMERCIAL

## (undated) DEVICE — BANDAGE ESMARK 6X12

## (undated) DEVICE — DRAPE PLASTIC U 60X72

## (undated) DEVICE — SHEET DRAPE FAN-FOLDED 3/4

## (undated) DEVICE — GLOVE ULTRATOUCH PLYSPHRN SZ9

## (undated) DEVICE — NDL 10CC 20GAX1 COMBO

## (undated) DEVICE — DRESSING N ADH OIL EMUL 3X3

## (undated) DEVICE — SYR B-D DISP CONTROL 10CC100/C

## (undated) DEVICE — GLOVE SURG ULTRA TOUCH 7

## (undated) DEVICE — SUT FIBERWIRE 2 38 IN TAPER

## (undated) DEVICE — NDL ELECTRODE E-Z CLEAN 2.75IN

## (undated) DEVICE — CLOSURE SKIN STERI STRIP 1/2X4

## (undated) DEVICE — SPONGE LAP 18X18 PREWASHED

## (undated) DEVICE — SUT CTD VICRYL 0 UND BR

## (undated) DEVICE — GOWN B1 X-LG X-LONG

## (undated) DEVICE — MARKER SKIN RULER AND LABEL

## (undated) DEVICE — BLADE #15 STERILE CARBON

## (undated) DEVICE — SPLINT PLASTER FAST SET 5X30IN

## (undated) DEVICE — SEE MEDLINE ITEM 156964

## (undated) DEVICE — BANDAGE MATRIX HK LOOP 4IN 5YD

## (undated) DEVICE — TOURNIQUET SB QC SP 30X4IN

## (undated) DEVICE — SUT BLU BR 2 TAPERD NDL 1/2

## (undated) DEVICE — GLOVE SURG ULTRA TOUCH 8.5

## (undated) DEVICE — NDL SURGEON MAYO #7 2/PK 72PKS

## (undated) DEVICE — DRAPE STERI U-SHAPED 47X51IN

## (undated) DEVICE — SUT 2-0 VICRYL / SH (J417)

## (undated) DEVICE — SEE MEDLINE ITEM 152529

## (undated) DEVICE — KIT SAHARA DRAPE DRAW/LIFT

## (undated) DEVICE — GLOVE SURG ULTRA TOUCH 8

## (undated) DEVICE — SOL 9P NACL IRR PIC IL

## (undated) DEVICE — PAD CAST SPECIALIST STRL 4

## (undated) DEVICE — ADHESIVE MASTISOL VIAL 48/BX

## (undated) DEVICE — GAUZE SPONGE 4X4 12PLY

## (undated) DEVICE — ELECTRODE REM PLYHSV RETURN 9

## (undated) DEVICE — SPONGE/BRUSH SCRUB SURG PCMX

## (undated) DEVICE — SEE MEDLINE ITEM 146298

## (undated) DEVICE — SUT ETHILON 4-0 PS2 18 BLK

## (undated) DEVICE — DRAPE STERI INSTRUMENT 1018

## (undated) DEVICE — SEE MEDLINE ITEM 157116

## (undated) DEVICE — SPONGE GAUZE 16PLY 4X4

## (undated) DEVICE — GLOVE SURG ULTRA TOUCH 7.5

## (undated) DEVICE — SEE MEDLINE ITEM 154981

## (undated) DEVICE — GLOVE BIOGEL PI ORTHO PRO SZ7